# Patient Record
Sex: FEMALE | Race: WHITE | NOT HISPANIC OR LATINO | Employment: OTHER | URBAN - METROPOLITAN AREA
[De-identification: names, ages, dates, MRNs, and addresses within clinical notes are randomized per-mention and may not be internally consistent; named-entity substitution may affect disease eponyms.]

---

## 2017-02-13 RX ORDER — ZOLPIDEM TARTRATE 10 MG/1
TABLET ORAL
Qty: 30 TABLET | Refills: 0 | Status: SHIPPED | OUTPATIENT
Start: 2017-02-13 | End: 2017-03-14 | Stop reason: SDUPTHER

## 2017-02-13 RX ORDER — LEVOTHYROXINE SODIUM 75 UG/1
TABLET ORAL
Qty: 30 TABLET | Refills: 0 | Status: SHIPPED | OUTPATIENT
Start: 2017-02-13 | End: 2017-06-23 | Stop reason: SDUPTHER

## 2017-02-14 RX ORDER — ZOLPIDEM TARTRATE 10 MG/1
TABLET ORAL
Qty: 30 TABLET | Refills: 0 | OUTPATIENT
Start: 2017-02-14

## 2017-02-14 NOTE — TELEPHONE ENCOUNTER
----- Message from Marcy Villanueva sent at 2/14/2017 12:18 PM CST -----  Contact: nico/daughter [phone]  States that pt needs refill on ambien. Pt uses     [facility] - [place] - [address]  [address] [address]  Phone: [phone] Fax: [phone]    Please call back at [phone]//thank you acc

## 2017-02-16 ENCOUNTER — APPOINTMENT (OUTPATIENT)
Dept: RADIOLOGY | Facility: HOSPITAL | Age: 77
End: 2017-02-16
Attending: FAMILY MEDICINE
Payer: MEDICARE

## 2017-02-16 ENCOUNTER — OFFICE VISIT (OUTPATIENT)
Dept: INTERNAL MEDICINE | Facility: CLINIC | Age: 77
End: 2017-02-16
Payer: MEDICARE

## 2017-02-16 VITALS
OXYGEN SATURATION: 94 % | WEIGHT: 156.88 LBS | SYSTOLIC BLOOD PRESSURE: 203 MMHG | TEMPERATURE: 98 F | HEART RATE: 74 BPM | DIASTOLIC BLOOD PRESSURE: 101 MMHG | BODY MASS INDEX: 28.69 KG/M2

## 2017-02-16 DIAGNOSIS — N18.4 CKD (CHRONIC KIDNEY DISEASE) STAGE 4, GFR 15-29 ML/MIN: ICD-10-CM

## 2017-02-16 DIAGNOSIS — I10 ESSENTIAL HYPERTENSION: ICD-10-CM

## 2017-02-16 DIAGNOSIS — I71.40 ABDOMINAL AORTIC ANEURYSM (AAA) WITHOUT RUPTURE: ICD-10-CM

## 2017-02-16 DIAGNOSIS — E78.2 MIXED HYPERLIPIDEMIA: ICD-10-CM

## 2017-02-16 DIAGNOSIS — M54.50 ACUTE BILATERAL LOW BACK PAIN WITHOUT SCIATICA: ICD-10-CM

## 2017-02-16 DIAGNOSIS — M54.50 ACUTE BILATERAL LOW BACK PAIN WITHOUT SCIATICA: Primary | ICD-10-CM

## 2017-02-16 PROCEDURE — 72100 X-RAY EXAM L-S SPINE 2/3 VWS: CPT | Mod: TC,PO

## 2017-02-16 PROCEDURE — 72100 X-RAY EXAM L-S SPINE 2/3 VWS: CPT | Mod: 26,,, | Performed by: RADIOLOGY

## 2017-02-16 PROCEDURE — 99214 OFFICE O/P EST MOD 30 MIN: CPT | Mod: S$PBB,,, | Performed by: FAMILY MEDICINE

## 2017-02-16 PROCEDURE — 99999 PR PBB SHADOW E&M-EST. PATIENT-LVL III: CPT | Mod: PBBFAC,,, | Performed by: FAMILY MEDICINE

## 2017-02-16 RX ORDER — TRAMADOL HYDROCHLORIDE 50 MG/1
50 TABLET ORAL EVERY 6 HOURS PRN
Qty: 30 TABLET | Refills: 0 | Status: SHIPPED | OUTPATIENT
Start: 2017-02-16 | End: 2017-11-01 | Stop reason: SDUPTHER

## 2017-02-16 NOTE — MR AVS SNAPSHOT
[facility]  [address]  [place] [address]  Phone: [phone]  Fax: [phone]                  Anu Blanco   2017 1:40 PM   Office Visit    Description:  Female : 1940   Provider:  Serena Chu MD   Department:  [facility]           Reason for Visit     Abdominal Pain     Back Pain           Diagnoses this Visit        Comments    Acute bilateral low back pain without sciatica    -  Primary            To Do List           Future Appointments        Provider Department Dept Phone    2017 3:15 PM [facility] XR1 [facility] [phone]      Goals (5 Years of Data)     None       These Medications        Disp Refills Start End    tramadol (ULTRAM) 50 mg tablet 30 tablet 0 2017    Take 1 tablet (50 mg total) by mouth every 6 (six) hours as needed for Pain. - Oral    Pharmacy: [facility] [address] [place], [place] [address]ON [address] Ph #: [phone]         [facility]     [facility] Care Line -  Assistance  Registered nurses in the [facility] Center provide clinical advisement, health education, appointment booking, and other advisory services.  Call for this free service at [phone].             Medications           Message regarding Medications     Verify the changes and/or additions to your medication regime listed below are the same as discussed with your clinician today.  If any of these changes or additions are incorrect, please notify your healthcare provider.        START taking these NEW medications        Refills    tramadol (ULTRAM) 50 mg tablet 0    Sig: Take 1 tablet (50 mg total) by mouth every 6 (six) hours as needed for Pain.    Class: Normal    Route: Oral           Verify that the below list of medications is an accurate representation of the medications you are currently taking.  If none reported, the list may be blank. If  incorrect, please contact your healthcare provider. Carry this list with you in case of emergency.           Current Medications     ACETAMINOPHEN (TYLENOL ARTHRITIS ORAL) Take by mouth once daily.    atorvastatin (LIPITOR) 40 MG tablet Take 1 tablet (40 mg total) by mouth once daily.    calcitRIOL (ROCALTROL) 0.25 MCG Cap Take 1 capsule by mouth once daily.    ERGOCALCIFEROL, VITAMIN D2, (VITAMIN D2 ORAL) Take by mouth once daily.    hydrALAZINE (APRESOLINE) 25 MG tablet 25 mg 2 (two) times daily.     levothyroxine (SYNTHROID) 75 MCG tablet TAKE ONE TABLET BY MOUTH ONCE DAILY    zolpidem (AMBIEN) 10 mg Tab TAKE ONE TABLET BY MOUTH ONCE DAILY AT BEDTIME AS NEEDED    carvedilol (COREG) 25 MG tablet Take 1 tablet by mouth once daily.    tramadol (ULTRAM) 50 mg tablet Take 1 tablet (50 mg total) by mouth every 6 (six) hours as needed for Pain.           Clinical Reference Information           Your Vitals Were     BP Pulse Temp    203/101 (BP Location: Left arm, Patient Position: Sitting, BP Method: Automatic) 74 97.9 °F (36.6 °C) (Tympanic)    Weight SpO2 BMI    71.2 kg (156 lb 13.7 oz) 94% 28.69 kg/m2      Blood Pressure          Most Recent Value    BP  (!)  203/101      Allergies as of 2/16/2017     Codeine    Corticosteroids (Glucocorticoids)    Diovan Hct [Valsartan-hydrochlorothiazide]    Hydrocodone    Indomethacin    Iodine And Iodide Containing Products    Lortab [Hydrocodone-acetaminophen]    Omnicef [Cefdinir]    Oxycodone    Penicillins      Immunizations Administered on Date of Encounter - 2/16/2017     None      Orders Placed During Today's Visit     Future Labs/Procedures Expected by Expires    X-Ray Lumbar Spine AP And Lateral  2/16/2017 2/16/2018      [facility] Sign-Up     Activating your [facility] account is as easy as 1-2-3!     1) Visit [email or web address], select Sign Up Now, enter this activation code and your date of birth, then select Next.  [email or web address]  Expires: 4/2/2017  3:10 PM      2)  Create a username and password to use when you visit [facility] in the future and select a security question in case you lose your password and select Next.    3) Enter your e-mail address and click Sign Up!    Additional Information  If you have questions, please e-mail [email or web address] or call [phone] to talk to our [facility] staff. Remember, [facility] is NOT to be used for urgent needs. For medical emergencies, dial 911.         Language Assistance Services     ATTENTION: Language assistance services are available, free of charge. Please call [phone].      ATENCIÓN: Si habla español, tiene a richardson disposición servicios gratuitos de asistencia lingüística. Llame al [phone].     CHÚ Ý: N?u b?n nói Ti?ng Vi?t, có các d?ch v? h? tr? ngôn ng? mi?n phí dành cho b?n. G?i s? [phone].         [facility] complies with applicable Federal civil rights laws and does not discriminate on the basis of race, color, national origin, age, disability, or sex.

## 2017-02-17 ENCOUNTER — TELEPHONE (OUTPATIENT)
Dept: INTERNAL MEDICINE | Facility: CLINIC | Age: 77
End: 2017-02-17

## 2017-02-17 NOTE — TELEPHONE ENCOUNTER
----- Message from Mary Belcher sent at 2/17/2017  9:55 AM CST -----  Contact: Isis/daughter  Isis returned call, Please call her at [phone].    Thanks  Td

## 2017-02-17 NOTE — TELEPHONE ENCOUNTER
Daughter has been informed of the appointments.  Also she informed me that the patient said she was able to sleep last night with the tramadol and tylenol, but she was afraid to take the tramadol during the day because of the sedating affect.  So how many mg of Tylenol can she have in a 24 hour period

## 2017-02-17 NOTE — TELEPHONE ENCOUNTER
Appointment scheduled for 2/20/17 at 10 am with an arrival time at 9:30 am.  Dr juan carlos Giron.  Referral faxed.  Message left for the patient.

## 2017-02-17 NOTE — PROGRESS NOTES
Subjective:       Patient ID: Anu Blanco is a 77 y.o. female.    Chief Complaint: Abdominal Pain and Back Pain    HPI Comments: She is a new patient to me.  She has a history of coronary artery disease, PAD, aortic valve replacement, breast cancer, OA, hypertension, hyperlipidemia, fibromyalgia, hypothyroidism, memory impairment and a history of a syncopal episode last July.    She is here with her daughter, Isis Stover.  She had a fall 3 days ago in her bathroom.  She remembers falling and that she could get up but not much else.  She has a bruise and a tender spot on forehead, a bruise to her right upper arm, and she is complaining of low back pain.  She has had a history of back pain off and on in the past.  She did not have any complaints of back pain or memory of significant back pain at the time of the fall.  It has seemed to progress over the next 3 days.  She had abdominal pain initially which has resolved completely.  She also is having some left leg pain which has resolved.  She took tramadol and a Tylenol last night and felt it helped her sleep.     Note high blood pressure on initial intake of 203/101.  She states she is in renal failure, [facility]  Her repeat lipid pressure is 180/92.  She states she did not take her blood pressure medicine this morning and feels the pain is increasing her blood pressure.      Back Pain   The current episode started in the past 7 days. The problem occurs constantly. The problem has been gradually worsening since onset. The pain is present in the lumbar spine. The quality of the pain is described as aching and stabbing. The pain does not radiate. The pain is severe. The symptoms are aggravated by position and standing. Pertinent negatives include no abdominal pain, fever or leg pain. She has tried analgesics for the symptoms. The treatment provided mild relief.   Fall   The accident occurred 3 to 5 days ago. The fall  occurred while standing. Distance fallen: Standing. She landed on hard floor. There was no blood loss. The point of impact was the head (Right upper arm). The pain is present in the back. The pain is severe. The symptoms are aggravated by standing and movement. Pertinent negatives include no abdominal pain, fever or loss of consciousness. She has tried acetaminophen (Tramadol) for the symptoms. The treatment provided mild relief.     Review of Systems   Constitutional: Negative for fever.   Gastrointestinal: Negative for abdominal pain.   Musculoskeletal: Positive for back pain.   Neurological: Negative for loss of consciousness.       Objective:      Physical Exam   Constitutional: She is oriented to person, place, and time. She appears well-developed and well-nourished.   HENT:   Head: Normocephalic and atraumatic.   Right Ear: Tympanic membrane, external ear and ear canal normal.   Left Ear: Tympanic membrane, external ear and ear canal normal.   Nose: Nose normal.   Mouth/Throat: Oropharynx is clear and moist. No oropharyngeal exudate.   Eyes: Conjunctivae and EOM are normal.   Neck: Normal range of motion. Neck supple. No thyromegaly present.   Cardiovascular: Normal rate, regular rhythm and normal heart sounds.  Exam reveals no gallop and no friction rub.    No murmur heard.  Pulmonary/Chest: Effort normal and breath sounds normal. She exhibits no tenderness.   Abdominal: Soft. She exhibits no distension. There is no tenderness.   Musculoskeletal: She exhibits no edema.   Nontender cervical paraspinals except with tenderness to left trapezius, nontender right trapezius.  She has tender thoracic and lumbar paraspinals left greater than right.  Midline spine is somewhat tender throughout.  There is no point tenderness over any specific spinous process significantly greater than any other.   Lymphadenopathy:     She has no cervical adenopathy.   Neurological: She is alert and oriented to person, place, and time.    Skin: Skin is warm and dry.   Ecchymosis to forehead just right of center with tenderness locally.  No step-offs palpated.  Ecchymosis to right upper arm.   Psychiatric: She has a normal mood and affect. Her behavior is normal.       Assessment:       1. Acute bilateral low back pain without sciatica        Plan:     1. Acute bilateral low back pain without sciatica  X-Ray Lumbar Spine AP And Lateral    tramadol (ULTRAM) 50 mg tablet     X-ray shows L2 vertebral compression fracture of uncertain age.  It also shows a fusiform infrarenal abdominal aortic aneurysm.  I discussed both with daughter and patient.  Patient has a history of an abdominal aortic aneurysm which was being followed.  At a certain time she stopped having it followed as she does not want to have surgery performed.  Also note that despite the current ability to do intravascular procedures, these would require IV contrast.  Depending on what her renal function is, this may be contraindicated as well.  She has an appointment for lab at [facility] tomorrow.  I'm going to see if we can get a repeat lipid panel after reviewing her lab from last May.  Now that Crestor is generic, we could consider switching her Lipitor 40 to Crestor 40.  I reviewed with both daughter and patient recommendation for evaluation with neurosurgery for possible vertebral plasty/kyphoplasty.  Again, depending on what imaging is needed, her renal function may contraindicate this procedure as well.  Patient agrees to a neurosurgery appointment with option to cancel if she feels she is doing better.    Addendum: 2/17/17 neurosurgery appointment scheduled for 2/20/17 at 10 am with an arrival time at 9:30 am. Dr juan carlos Giron.

## 2017-03-14 RX ORDER — ZOLPIDEM TARTRATE 10 MG/1
TABLET ORAL
Qty: 30 TABLET | Refills: 0 | Status: SHIPPED | OUTPATIENT
Start: 2017-03-14 | End: 2017-04-11 | Stop reason: SDUPTHER

## 2017-03-15 RX ORDER — ZOLPIDEM TARTRATE 10 MG/1
TABLET ORAL
Qty: 30 TABLET | Refills: 0 | OUTPATIENT
Start: 2017-03-15

## 2017-03-20 RX ORDER — ATORVASTATIN CALCIUM 40 MG/1
TABLET, FILM COATED ORAL
Qty: 30 TABLET | Refills: 2 | Status: SHIPPED | OUTPATIENT
Start: 2017-03-20

## 2017-04-11 RX ORDER — ZOLPIDEM TARTRATE 10 MG/1
TABLET ORAL
Qty: 30 TABLET | Refills: 0 | OUTPATIENT
Start: 2017-04-11

## 2017-04-11 RX ORDER — ZOLPIDEM TARTRATE 10 MG/1
TABLET ORAL
Qty: 30 TABLET | Refills: 0 | Status: SHIPPED | OUTPATIENT
Start: 2017-04-11 | End: 2017-04-11 | Stop reason: SDUPTHER

## 2017-04-12 RX ORDER — ZOLPIDEM TARTRATE 10 MG/1
TABLET ORAL
Qty: 30 TABLET | Refills: 0 | Status: SHIPPED | OUTPATIENT
Start: 2017-04-12 | End: 2017-04-27 | Stop reason: SDUPTHER

## 2017-04-12 RX ORDER — ZOLPIDEM TARTRATE 10 MG/1
TABLET ORAL
Qty: 30 TABLET | Refills: 0 | OUTPATIENT
Start: 2017-04-12

## 2017-04-13 RX ORDER — ZOLPIDEM TARTRATE 10 MG/1
TABLET ORAL
Qty: 30 TABLET | Refills: 0 | OUTPATIENT
Start: 2017-04-13

## 2017-04-27 NOTE — TELEPHONE ENCOUNTER
----- Message from Anu Cummings sent at 4/27/2017  4:00 PM CDT -----  Contact: nico-daughter  Pt daughter would like to speak to nurse regarding a refill for ambien (generic). Pt only has one pill left for tonight. Please call back @ [phone]. Thanks    .[address] [facility]  [address] [facility] [address]  Phone: [phone] Fax: [phone]

## 2017-04-27 NOTE — TELEPHONE ENCOUNTER
Called and spoke with the daughter, the script was already sent.  Called the pharmacy her last refill was done on 4/13/17.

## 2017-05-01 RX ORDER — ZOLPIDEM TARTRATE 10 MG/1
TABLET ORAL
Qty: 30 TABLET | Refills: 0 | Status: SHIPPED | OUTPATIENT
Start: 2017-05-01 | End: 2017-06-08 | Stop reason: SDUPTHER

## 2017-06-08 RX ORDER — ZOLPIDEM TARTRATE 10 MG/1
TABLET ORAL
Qty: 30 TABLET | Refills: 0 | Status: SHIPPED | OUTPATIENT
Start: 2017-06-08 | End: 2017-07-07 | Stop reason: SDUPTHER

## 2017-06-13 RX ORDER — ZOLPIDEM TARTRATE 10 MG/1
TABLET ORAL
Qty: 30 TABLET | Refills: 0 | OUTPATIENT
Start: 2017-06-13

## 2017-06-26 RX ORDER — LEVOTHYROXINE SODIUM 75 UG/1
TABLET ORAL
Qty: 30 TABLET | Refills: 0 | Status: SHIPPED | OUTPATIENT
Start: 2017-06-26 | End: 2017-06-27 | Stop reason: SDUPTHER

## 2017-06-27 RX ORDER — LEVOTHYROXINE SODIUM 75 UG/1
75 TABLET ORAL DAILY
Qty: 30 TABLET | Refills: 0 | Status: SHIPPED | OUTPATIENT
Start: 2017-06-27

## 2017-07-10 RX ORDER — ZOLPIDEM TARTRATE 10 MG/1
TABLET ORAL
Qty: 30 TABLET | Refills: 0 | Status: SHIPPED | OUTPATIENT
Start: 2017-07-10 | End: 2017-08-11 | Stop reason: SDUPTHER

## 2017-07-11 ENCOUNTER — TELEPHONE (OUTPATIENT)
Dept: INTERNAL MEDICINE | Facility: CLINIC | Age: 77
End: 2017-07-11

## 2017-07-11 NOTE — TELEPHONE ENCOUNTER
----- Message from Mary Belcher sent at 7/11/2017  2:46 PM CDT -----  Contact: Isis/daughter  1. What is the name of the medication you are requesting? Rx Ambien  2. What is the dose? 10 mg  3. How do you take the medication? Orally, topically, etc? oral  4. How often do you take this medication? Once at bed time  5. Do you need a 30 day or 90 day supply? 30  6. How many refills are you requesting? 5  7. What is your preferred pharmacy and location of the pharmacy?     [address] [facility]  [address] [facility] [address] [address]  Phone: [phone] Fax: [phone]    8. Who can we contact with further questions? Isis/[phone]

## 2017-08-11 ENCOUNTER — NURSE TRIAGE (OUTPATIENT)
Dept: ADMINISTRATIVE | Facility: CLINIC | Age: 77
End: 2017-08-11

## 2017-08-11 NOTE — TELEPHONE ENCOUNTER
----- Message from Gilda Noe sent at 8/11/2017  4:39 PM CDT -----  Contact: daughter  States the pt needs a refill on her sleep medication, pt can be reached at [phone]///thxMW

## 2017-08-12 NOTE — TELEPHONE ENCOUNTER
Reason for Disposition   Caller requesting a NON-URGENT new prescription or refill and triager unable to refill per unit policy    Protocols used: ST MEDICATION QUESTION CALL-A-GUILLERMINA Brennan's daughter called to request ambien refill for her mother.  Explained that scheduled medication is to be refilled during clinic hours. She states she will bring [facility] to [facility] for visit and refill.  Provided her with the names of [facility] open tomorrow.  Message to Douglas Worley MD . Please contact caller directly with any additional care advice.

## 2017-08-14 ENCOUNTER — TELEPHONE (OUTPATIENT)
Dept: INTERNAL MEDICINE | Facility: CLINIC | Age: 77
End: 2017-08-14

## 2017-08-14 ENCOUNTER — NURSE TRIAGE (OUTPATIENT)
Dept: ADMINISTRATIVE | Facility: CLINIC | Age: 77
End: 2017-08-14

## 2017-08-14 RX ORDER — ZOLPIDEM TARTRATE 10 MG/1
TABLET ORAL
Qty: 30 TABLET | Refills: 0 | Status: SHIPPED | OUTPATIENT
Start: 2017-08-14 | End: 2017-09-06 | Stop reason: SDUPTHER

## 2017-08-14 NOTE — TELEPHONE ENCOUNTER
Spoke with daughter, informed Dr. Worley was not in office today, will be sending to Dr. Chu, also states she wanted to make Dr. Chu her PCP, has been changed.

## 2017-08-14 NOTE — TELEPHONE ENCOUNTER
----- Message from Romina Lawson sent at 8/14/2017  8:16 AM CDT -----  Contact: Patients daughter, Isis Marinelli is checking on the status of a refill on Ambien stating her mother has not slept all weekend, please call her back at [phone]. Thank you

## 2017-08-14 NOTE — TELEPHONE ENCOUNTER
----- Message from Malaika Macho sent at 8/14/2017  2:31 PM CDT -----  Contact: pt  1. What is the name of the medication you are requesting? Ambien  2. What is the dose? 10mg  3. How do you take the medication? Orally, topically, etc? orally  4. How often do you take this medication? 1at bedtime  5. Do you need a 30 day or 90 day supply? 30  6. How many refills are you requesting? 1  7. What is your preferred pharmacy and location of the pharmacy? [facility]  8. Who can we contact with further questions? Pt @[phone]

## 2017-08-15 RX ORDER — ZOLPIDEM TARTRATE 10 MG/1
TABLET ORAL
Qty: 30 TABLET | Refills: 0 | Status: SHIPPED | OUTPATIENT
Start: 2017-08-15 | End: 2017-09-06 | Stop reason: SDUPTHER

## 2017-08-15 NOTE — TELEPHONE ENCOUNTER
Script was called in yesterday and today.  Pharmacy is closed at this time will call back at 9 am

## 2017-08-15 NOTE — TELEPHONE ENCOUNTER
Reason for Disposition   Caller requesting a NON-URGENT new prescription or refill and triager unable to refill per unit policy    Answer Assessment - Initial Assessment Questions  Daughter reports pt has been out of ambien since Friday night and has not slept at all since then. She has called clinic today and was advised it would be refilled but pharmacy didn't receive anything.    Protocols used: ST MEDICATION QUESTION CALL-A-AH    Advised I would send a note to office but can't say this will be done this pm if she has not actually slept at all since Friday night would recommend she take her to an UC or ER for evaluation.

## 2017-09-06 RX ORDER — ZOLPIDEM TARTRATE 10 MG/1
TABLET ORAL
Qty: 30 TABLET | Refills: 2 | Status: ON HOLD | OUTPATIENT
Start: 2017-09-06 | End: 2018-03-27 | Stop reason: HOSPADM

## 2017-10-31 ENCOUNTER — TELEPHONE (OUTPATIENT)
Dept: INTERNAL MEDICINE | Facility: CLINIC | Age: 77
End: 2017-10-31

## 2017-10-31 DIAGNOSIS — M54.50 ACUTE BILATERAL LOW BACK PAIN WITHOUT SCIATICA: ICD-10-CM

## 2017-10-31 NOTE — TELEPHONE ENCOUNTER
[facility]  Compression fracture L2 and 3   Pt is in a lot of pain.  Was given percocet at the ER and the patient threw it away   Doesn't like the way it makes her feel.  Please advise if there is a brace or something that she can do

## 2017-10-31 NOTE — TELEPHONE ENCOUNTER
----- Message from Jacinda Horowitz sent at 10/31/2017  3:46 PM CDT -----  Contact: pt daughter nico   Please call pt daughter back at [phone] about seeing pt today for super pain from a fall.

## 2017-10-31 NOTE — TELEPHONE ENCOUNTER
Left message for patient.  She may benefit from evaluation by a neurosurgeon for a possible procedure.  She may just need an alternate pain medication.  She had an L2 compression fracture, mild wedging seen on x-ray 2/16/17.  She may have a new L3 fracture at this point.

## 2017-11-01 ENCOUNTER — TELEPHONE (OUTPATIENT)
Dept: INTERNAL MEDICINE | Facility: CLINIC | Age: 77
End: 2017-11-01

## 2017-11-01 RX ORDER — TRAMADOL HYDROCHLORIDE 50 MG/1
50 TABLET ORAL EVERY 6 HOURS PRN
Qty: 30 TABLET | Refills: 0 | Status: SHIPPED | OUTPATIENT
Start: 2017-11-01 | End: 2017-11-11

## 2017-11-01 NOTE — TELEPHONE ENCOUNTER
Pt daughter nico called in stating that her mom had a fall on Friday at 1:30 am. Pt daughter wants to know if her moms pain medication can be increased. She states that her mom was prescribed oxycodone, but can not take this medication. Pt daughter also wants to know about if a back brace can be ordered for her mom and if she can be placed at a rehab facility or if not can she get home health to come out two to three days a week to help her mom out.    Please Advise

## 2017-11-01 NOTE — TELEPHONE ENCOUNTER
I spoke with patient's daughter Isis.  Patient is taking tramadol from last Feb fracture - BID - without much help.  I advised to increase to TID.    Had itching with oxycodone and hydrocodone per daughter.     I will send more tramadol to her pharmacy.  I will place a referral for home health and patient must see me within 4 weeks if she accepts home health.  Recommendation given for back specialist with [facility] if desired. Pt declined pursuing arthroplasty again this time per daughter.

## 2017-11-01 NOTE — TELEPHONE ENCOUNTER
----- Message from Lavinia Belcher sent at 11/1/2017  2:20 PM CDT -----  Contact: [facility]  Call caller regarding home health for pt. Caller states that they cant except pt till she see the doctor.  [phone]

## 2017-11-01 NOTE — TELEPHONE ENCOUNTER
Appointment needs to be in the records within 30 days due to medicare.  Appointment made per the daughter

## 2017-11-07 ENCOUNTER — TELEPHONE (OUTPATIENT)
Dept: INTERNAL MEDICINE | Facility: CLINIC | Age: 77
End: 2017-11-07

## 2017-11-07 ENCOUNTER — OFFICE VISIT (OUTPATIENT)
Dept: INTERNAL MEDICINE | Facility: CLINIC | Age: 77
End: 2017-11-07
Payer: MEDICARE

## 2017-11-07 VITALS
SYSTOLIC BLOOD PRESSURE: 124 MMHG | HEART RATE: 63 BPM | RESPIRATION RATE: 18 BRPM | TEMPERATURE: 98 F | DIASTOLIC BLOOD PRESSURE: 74 MMHG

## 2017-11-07 DIAGNOSIS — E03.4 HYPOTHYROIDISM DUE TO ACQUIRED ATROPHY OF THYROID: ICD-10-CM

## 2017-11-07 DIAGNOSIS — R41.0 INTERMITTENT CONFUSION: Primary | ICD-10-CM

## 2017-11-07 DIAGNOSIS — Z23 IMMUNIZATION DUE: ICD-10-CM

## 2017-11-07 DIAGNOSIS — N18.4 CKD (CHRONIC KIDNEY DISEASE) STAGE 4, GFR 15-29 ML/MIN: ICD-10-CM

## 2017-11-07 DIAGNOSIS — I10 ESSENTIAL HYPERTENSION: ICD-10-CM

## 2017-11-07 LAB
ANION GAP SERPL CALC-SCNC: 8 MMOL/L
BUN SERPL-MCNC: 27 MG/DL
CALCIUM SERPL-MCNC: 9.7 MG/DL
CHLORIDE SERPL-SCNC: 101 MMOL/L
CO2 SERPL-SCNC: 27 MMOL/L
CREAT SERPL-MCNC: 1.9 MG/DL
EST. GFR  (AFRICAN AMERICAN): 28.5 ML/MIN/1.73 M^2
EST. GFR  (NON AFRICAN AMERICAN): 24.7 ML/MIN/1.73 M^2
GLUCOSE SERPL-MCNC: 103 MG/DL
POTASSIUM SERPL-SCNC: 4 MMOL/L
SODIUM SERPL-SCNC: 136 MMOL/L
T4 FREE SERPL-MCNC: 1.1 NG/DL
TSH SERPL DL<=0.005 MIU/L-ACNC: 5.44 UIU/ML

## 2017-11-07 PROCEDURE — 84439 ASSAY OF FREE THYROXINE: CPT

## 2017-11-07 PROCEDURE — 80048 BASIC METABOLIC PNL TOTAL CA: CPT

## 2017-11-07 PROCEDURE — 99213 OFFICE O/P EST LOW 20 MIN: CPT | Mod: S$PBB,,, | Performed by: FAMILY MEDICINE

## 2017-11-07 PROCEDURE — 99213 OFFICE O/P EST LOW 20 MIN: CPT | Mod: PBBFAC,PO | Performed by: FAMILY MEDICINE

## 2017-11-07 PROCEDURE — 99999 PR PBB SHADOW E&M-EST. PATIENT-LVL III: CPT | Mod: PBBFAC,,, | Performed by: FAMILY MEDICINE

## 2017-11-07 PROCEDURE — 84443 ASSAY THYROID STIM HORMONE: CPT

## 2017-11-07 PROCEDURE — G0008 ADMIN INFLUENZA VIRUS VAC: HCPCS | Mod: PBBFAC,PO

## 2017-11-07 RX ORDER — AMLODIPINE BESYLATE 5 MG/1
5 TABLET ORAL DAILY
COMMUNITY
Start: 2017-10-16 | End: 2018-05-04 | Stop reason: ALTCHOICE

## 2017-11-07 NOTE — TELEPHONE ENCOUNTER
----- Message from Jacinda Horowitz sent at 11/7/2017 11:45 AM CST -----  Please call pt daughter back at [phone] in regards pt is having some confusion. Pt daughter ants her to be seen today. Pt is talking like she is in the nursing home.

## 2017-11-08 ENCOUNTER — TELEPHONE (OUTPATIENT)
Dept: INTERNAL MEDICINE | Facility: CLINIC | Age: 77
End: 2017-11-08

## 2017-11-08 DIAGNOSIS — R41.0 INTERMITTENT CONFUSION: ICD-10-CM

## 2017-11-08 DIAGNOSIS — E03.4 HYPOTHYROIDISM DUE TO ACQUIRED ATROPHY OF THYROID: ICD-10-CM

## 2017-11-08 NOTE — TELEPHONE ENCOUNTER
----- Message from Isrrael Coates sent at 11/8/2017  4:07 PM CST -----  Contact: Ema ( [facility] )  Ema ( [facility] ) is requesting an order for a bedside commode.        Please call Ema ( [facility] )  [phone]

## 2017-11-09 NOTE — TELEPHONE ENCOUNTER
Dr Lee,  Jesse yuen.    She fell again and broke her ankle she is at [facility] waiting to see if they can do surgery on the ankle.

## 2017-11-09 NOTE — TELEPHONE ENCOUNTER
----- Message from Serena Chu MD sent at 11/8/2017  8:13 AM CST -----  Thyroid tests does show that your supplementation might be a little less than desirable.  However the confirmatory testing was within normal range.  No change at this time to medication.  Kidney function is at stage IV.  Your EGFR is 24.7.  Please follow up with your nephrologist/kidney doctor.  Recheck with me as discussed.

## 2017-11-09 NOTE — TELEPHONE ENCOUNTER
----- Message from Susannah Adame sent at 11/9/2017  4:17 PM CST -----  Contact: ms walsh-daughter  Returning call regarding patient. Please call back at [phone].        Thanks,  Susannah Adame

## 2017-11-09 NOTE — TELEPHONE ENCOUNTER
She got up in the night, tripped/fell, broken ankle - getting surgery tonight. She had done well with mentation off the tramadol. No further problems since her visit 11/7/17.    Lab results given to pt and daughter - will needTSH recheck early January latest.

## 2017-11-10 ENCOUNTER — TELEPHONE (OUTPATIENT)
Dept: INTERNAL MEDICINE | Facility: CLINIC | Age: 77
End: 2017-11-10

## 2017-11-10 NOTE — TELEPHONE ENCOUNTER
Called and spoke with the patient daughter and informed her of her moms order for her commode for home use. Pt daughter states that he mom is back in the hospital for breaking her foot in two places. Pt daughter states that she will  the order at a later date and time. Pt daughter verbalized understanding of the information given.

## 2017-11-11 NOTE — PROGRESS NOTES
Subjective:       Patient ID: Anu Blanco is a 77 y.o. female.    Chief Complaint: Altered Mental Status    Here, with her daughter Isis Stover and her , for hospital follow-up.  She had trouble since falling on the bathroom floor 10/27/17.  She was diagnosed with 2 vertebral fractures and given oxycodone.  She threw out this medication as she did not tolerate how she felt.  I was notified of her status over the phone and on 11/1/17 sent a refill for tramadol to her pharmacy.    Now over the past weekend she has had several episodes of confusion and possible delusions.  Then yesterday morning she was unable to get off the commode.  She was seen at [facility] after EMS thought they found right upper extremity weakness.  However, she was seen by Dr. Perez in consultation at the ED.  Her CT scan was negative for acute CVA and his exam showed no focal weakness.  Therefore she was discharged.  Tramadol was stopped.  She is also using Tylenol PM and Ambien 10 mg.      Review of Systems   Musculoskeletal: Positive for back pain and gait problem.   Neurological: Positive for weakness.   Psychiatric/Behavioral: Positive for confusion.       Objective:      Physical Exam   Constitutional: She is oriented to person, place, and time. She appears well-developed.   Seated in wheel chair   HENT:   Head: Normocephalic and atraumatic.   Cardiovascular: Normal rate, regular rhythm and normal heart sounds.    Pulmonary/Chest: Effort normal and breath sounds normal.   Musculoskeletal: She exhibits edema (trace bilateral ankles).   Neurological: She is alert and oriented to person, place, and time.   Skin: Skin is warm and dry.   Psychiatric: She has a normal mood and affect. Her behavior is normal.         Assessment/Plan:     1. Intermittent confusion     2. Essential hypertension  Basic metabolic panel   3. Vertebral compression fracture, initial encounter     4. Immunization due  Influenza - High Dose (65+)  (PF) (IM)   5. CKD (chronic kidney disease) stage 4, GFR 15-29 ml/min  Basic metabolic panel   6. Hypothyroidism due to acquired atrophy of thyroid  TSH    continue without tramadol and monitor for mental status.  She has home health.  She may need inpatient rehabilitation if transfers cannot be managed.

## 2018-03-20 ENCOUNTER — TELEPHONE (OUTPATIENT)
Dept: INTERNAL MEDICINE | Facility: CLINIC | Age: 78
End: 2018-03-20

## 2018-03-20 NOTE — TELEPHONE ENCOUNTER
Spoke with Cristal from [facility]. She states that the patient daughter called in stating that the pt is having some swelling and pain with her rt foot. Pt had surgery on her right foot in November of 20/17. Pt is also running a fever and unable to walk on her foot. Cristal was advised that I would call the pt daughter to find out more information. Pt daughter stated the same thing. That her mom right foot is swollen painful and she can not walk on it. Pt had a fever last night but her daughter states that she gave her tylenol and her fever broke. Pt daughter state that her mom can come in and be seen on Thursday morning, but there is no opening. Pt daughter states that her mom can come in on the evening on Friday. I will call the pt daughter to place her mom with Dr. Chu for Friday evening.    Please Advise

## 2018-03-20 NOTE — TELEPHONE ENCOUNTER
----- Message from Marcy Villanueva sent at 3/20/2018  1:59 PM CDT -----  Contact: Cristal/[facility]  [phone]  States that pt is complaining of rt foot swelling and pain. States that pt is running a fever and unable to walk on rt foot. Please call back at [phone]//thank you acc

## 2018-03-20 NOTE — TELEPHONE ENCOUNTER
I spoke with pt and daughter - she has swelling to the right lower extremity especially to the foot.  She had an earlier ankle fracture with surgery back in November.  Denies any redness.  But it is hurting to walk on that right side.  Has chronic left lower extremity swelling/pain.    Subjective fever last night - given 2 tylenol PM last night. Pt has feet up today. OT was there today.    Patient states she is willing to see another doctor sooner.  Currently she is scheduled to see me on Friday.  I'm concerned about blood clot with this pain and swelling. May also benefit from xray foot/ankle.  I spoke with the daughter and she will obtain an appointment with Dr. Worley for Wednesday afternoon.

## 2018-03-21 ENCOUNTER — TELEPHONE (OUTPATIENT)
Dept: INTERNAL MEDICINE | Facility: CLINIC | Age: 78
End: 2018-03-21

## 2018-03-21 NOTE — TELEPHONE ENCOUNTER
----- Message from Malaika Pritchard sent at 3/21/2018  2:35 PM CDT -----  Contact: daughter/Isis.  Please call pt daughter @ [phone] regarding work in appt on 3/23 at 2pm, states nurse was going to put it in system.

## 2018-03-22 ENCOUNTER — TELEPHONE (OUTPATIENT)
Dept: INTERNAL MEDICINE | Facility: CLINIC | Age: 78
End: 2018-03-22

## 2018-03-22 NOTE — TELEPHONE ENCOUNTER
Returned the pt daughter nico phone call. She was calling in regards to getting her mom scheduled for 3/23/18 to see Dr. Chu. Schedule the pt to see Dr. Chu on tomorrow for 2 pm. Pt daughter verbalized understanding of the information given.

## 2018-03-22 NOTE — TELEPHONE ENCOUNTER
----- Message from Malaika Pritchard sent at 3/22/2018  9:46 AM CDT -----  Contact: Isis/daughter  Please call pt daughter @ [phone],states she returning nurse call.

## 2018-03-23 ENCOUNTER — HOSPITAL ENCOUNTER (INPATIENT)
Facility: HOSPITAL | Age: 78
LOS: 3 days | Discharge: NURSING, REHAB OR LONG-TERM CARE | DRG: 308 | End: 2018-03-27
Attending: EMERGENCY MEDICINE | Admitting: INTERNAL MEDICINE
Payer: MEDICARE

## 2018-03-23 ENCOUNTER — TELEPHONE (OUTPATIENT)
Dept: INTERNAL MEDICINE | Facility: CLINIC | Age: 78
End: 2018-03-23

## 2018-03-23 DIAGNOSIS — I48.0 PAROXYSMAL ATRIAL FIBRILLATION: ICD-10-CM

## 2018-03-23 DIAGNOSIS — I48.91 ATRIAL FIBRILLATION, NEW ONSET: ICD-10-CM

## 2018-03-23 DIAGNOSIS — N17.9 AKI (ACUTE KIDNEY INJURY): ICD-10-CM

## 2018-03-23 DIAGNOSIS — M79.606 LOWER EXTREMITY PAIN: ICD-10-CM

## 2018-03-23 DIAGNOSIS — R41.0 CONFUSION: ICD-10-CM

## 2018-03-23 DIAGNOSIS — R41.82 ALTERED MENTAL STATUS: ICD-10-CM

## 2018-03-23 DIAGNOSIS — I25.10 CORONARY ARTERY DISEASE INVOLVING NATIVE CORONARY ARTERY OF NATIVE HEART WITHOUT ANGINA PECTORIS: ICD-10-CM

## 2018-03-23 DIAGNOSIS — E83.52 HYPERCALCEMIA: ICD-10-CM

## 2018-03-23 DIAGNOSIS — I10 ESSENTIAL HYPERTENSION: ICD-10-CM

## 2018-03-23 DIAGNOSIS — R79.89 ELEVATED TROPONIN: ICD-10-CM

## 2018-03-23 DIAGNOSIS — I48.91 ATRIAL FIBRILLATION WITH RAPID VENTRICULAR RESPONSE: Primary | ICD-10-CM

## 2018-03-23 DIAGNOSIS — I95.2 HYPOTENSION DUE TO DRUGS: ICD-10-CM

## 2018-03-23 DIAGNOSIS — R00.0 TACHYCARDIA: ICD-10-CM

## 2018-03-23 DIAGNOSIS — I48.91 A-FIB: ICD-10-CM

## 2018-03-23 LAB
ALBUMIN SERPL BCP-MCNC: 2.8 G/DL
ALP SERPL-CCNC: 109 U/L
ALT SERPL W/O P-5'-P-CCNC: 7 U/L
ANION GAP SERPL CALC-SCNC: 8 MMOL/L
AST SERPL-CCNC: 13 U/L
BASOPHILS # BLD AUTO: 0.06 K/UL
BASOPHILS NFR BLD: 0.7 %
BILIRUB SERPL-MCNC: 0.2 MG/DL
BNP SERPL-MCNC: 360 PG/ML
BUN SERPL-MCNC: 29 MG/DL
CALCIUM SERPL-MCNC: 9.3 MG/DL
CHLORIDE SERPL-SCNC: 108 MMOL/L
CO2 SERPL-SCNC: 22 MMOL/L
CREAT SERPL-MCNC: 2 MG/DL
DIFFERENTIAL METHOD: ABNORMAL
EOSINOPHIL # BLD AUTO: 0.3 K/UL
EOSINOPHIL NFR BLD: 3.7 %
ERYTHROCYTE [DISTWIDTH] IN BLOOD BY AUTOMATED COUNT: 18 %
EST. GFR  (AFRICAN AMERICAN): 27 ML/MIN/1.73 M^2
EST. GFR  (NON AFRICAN AMERICAN): 23 ML/MIN/1.73 M^2
GLUCOSE SERPL-MCNC: 125 MG/DL
HCT VFR BLD AUTO: 31.1 %
HGB BLD-MCNC: 9.6 G/DL
INR PPP: 1
LYMPHOCYTES # BLD AUTO: 2 K/UL
LYMPHOCYTES NFR BLD: 22.6 %
MCH RBC QN AUTO: 26.2 PG
MCHC RBC AUTO-ENTMCNC: 30.9 G/DL
MCV RBC AUTO: 85 FL
MONOCYTES # BLD AUTO: 1 K/UL
MONOCYTES NFR BLD: 11.2 %
NEUTROPHILS # BLD AUTO: 5.4 K/UL
NEUTROPHILS NFR BLD: 61.8 %
PLATELET # BLD AUTO: 347 K/UL
PMV BLD AUTO: 10.1 FL
POTASSIUM SERPL-SCNC: 3.8 MMOL/L
PROT SERPL-MCNC: 7.5 G/DL
PROTHROMBIN TIME: 10.1 SEC
RBC # BLD AUTO: 3.66 M/UL
SODIUM SERPL-SCNC: 138 MMOL/L
TROPONIN I SERPL DL<=0.01 NG/ML-MCNC: 0.04 NG/ML
WBC # BLD AUTO: 8.75 K/UL

## 2018-03-23 PROCEDURE — 80053 COMPREHEN METABOLIC PANEL: CPT

## 2018-03-23 PROCEDURE — 84484 ASSAY OF TROPONIN QUANT: CPT

## 2018-03-23 PROCEDURE — 96374 THER/PROPH/DIAG INJ IV PUSH: CPT

## 2018-03-23 PROCEDURE — 84443 ASSAY THYROID STIM HORMONE: CPT

## 2018-03-23 PROCEDURE — 84100 ASSAY OF PHOSPHORUS: CPT

## 2018-03-23 PROCEDURE — 99285 EMERGENCY DEPT VISIT HI MDM: CPT

## 2018-03-23 PROCEDURE — 96372 THER/PROPH/DIAG INJ SC/IM: CPT

## 2018-03-23 PROCEDURE — 85610 PROTHROMBIN TIME: CPT

## 2018-03-23 PROCEDURE — 93010 ELECTROCARDIOGRAM REPORT: CPT | Mod: ,,, | Performed by: INTERNAL MEDICINE

## 2018-03-23 PROCEDURE — 96375 TX/PRO/DX INJ NEW DRUG ADDON: CPT

## 2018-03-23 PROCEDURE — 83735 ASSAY OF MAGNESIUM: CPT

## 2018-03-23 PROCEDURE — 85025 COMPLETE CBC W/AUTO DIFF WBC: CPT

## 2018-03-23 PROCEDURE — 83880 ASSAY OF NATRIURETIC PEPTIDE: CPT

## 2018-03-23 RX ORDER — HYDROCODONE BITARTRATE AND ACETAMINOPHEN 10; 325 MG/1; MG/1
TABLET ORAL
Status: ON HOLD | COMMUNITY
End: 2018-03-27 | Stop reason: HOSPADM

## 2018-03-23 NOTE — TELEPHONE ENCOUNTER
----- Message from Kevan Glass sent at 3/23/2018  2:42 PM CDT -----  Contact: nico, pt's daughter  She's calling in regards to the pt being worked back into the schedule today, pls advise, [phone] (cell)

## 2018-03-23 NOTE — TELEPHONE ENCOUNTER
----- Message from Lavinia Belcher sent at 3/23/2018 10:54 AM CDT -----  Contact: Isis/pt daughter   Call caller regarding getting pt in this afternoon. Caller states that pt is worst today than before.   [phone]

## 2018-03-23 NOTE — TELEPHONE ENCOUNTER
Spoke with the pt daughter (nico) she was calling in regards to seeing about her mom being seen on today. Advised the pt daughter that her mom does have a appointment for today for 2 pm. Pt daughter verbalized understanding of the information given.

## 2018-03-24 PROBLEM — J81.0 ACUTE PULMONARY EDEMA: Status: ACTIVE | Noted: 2018-03-24

## 2018-03-24 PROBLEM — R79.89 ELEVATED TROPONIN: Status: ACTIVE | Noted: 2018-03-24

## 2018-03-24 PROBLEM — I48.91 ATRIAL FIBRILLATION WITH RAPID VENTRICULAR RESPONSE: Status: ACTIVE | Noted: 2018-03-24

## 2018-03-24 PROBLEM — N30.00 ACUTE CYSTITIS WITHOUT HEMATURIA: Status: ACTIVE | Noted: 2018-03-24

## 2018-03-24 PROBLEM — I48.91 ATRIAL FIBRILLATION, NEW ONSET: Status: ACTIVE | Noted: 2018-03-24

## 2018-03-24 PROBLEM — I50.9 ACUTE CHF: Status: ACTIVE | Noted: 2018-03-24

## 2018-03-24 LAB
AMPHET+METHAMPHET UR QL: NEGATIVE
ANION GAP SERPL CALC-SCNC: 11 MMOL/L
ANISOCYTOSIS BLD QL SMEAR: ABNORMAL
ANISOCYTOSIS BLD QL SMEAR: ABNORMAL
BACTERIA #/AREA URNS HPF: ABNORMAL /HPF
BARBITURATES UR QL SCN>200 NG/ML: NEGATIVE
BASOPHILS NFR BLD: 1 %
BASOPHILS NFR BLD: 1 %
BENZODIAZ UR QL SCN>200 NG/ML: NEGATIVE
BILIRUB UR QL STRIP: NEGATIVE
BUN SERPL-MCNC: 28 MG/DL
BZE UR QL SCN: NEGATIVE
CALCIUM SERPL-MCNC: 9.7 MG/DL
CANNABINOIDS UR QL SCN: NEGATIVE
CHLORIDE SERPL-SCNC: 107 MMOL/L
CHOLEST SERPL-MCNC: 211 MG/DL
CHOLEST/HDLC SERPL: 5.6 {RATIO}
CLARITY UR: CLEAR
CO2 SERPL-SCNC: 21 MMOL/L
COLOR UR: YELLOW
CREAT SERPL-MCNC: 1.9 MG/DL
CREAT UR-MCNC: 59.9 MG/DL
CRP SERPL-MCNC: 78.4 MG/L
DACRYOCYTES BLD QL SMEAR: ABNORMAL
DACRYOCYTES BLD QL SMEAR: ABNORMAL
DIFFERENTIAL METHOD: ABNORMAL
DIFFERENTIAL METHOD: ABNORMAL
EOSINOPHIL NFR BLD: 3 %
EOSINOPHIL NFR BLD: 3 %
ERYTHROCYTE [DISTWIDTH] IN BLOOD BY AUTOMATED COUNT: 18.3 %
ERYTHROCYTE [DISTWIDTH] IN BLOOD BY AUTOMATED COUNT: 18.3 %
EST. GFR  (AFRICAN AMERICAN): 28 ML/MIN/1.73 M^2
EST. GFR  (NON AFRICAN AMERICAN): 25 ML/MIN/1.73 M^2
ESTIMATED AVG GLUCOSE: 105 MG/DL
GIANT PLATELETS BLD QL SMEAR: PRESENT
GIANT PLATELETS BLD QL SMEAR: PRESENT
GLUCOSE SERPL-MCNC: 89 MG/DL
GLUCOSE UR QL STRIP: NEGATIVE
HBA1C MFR BLD HPLC: 5.3 %
HCT VFR BLD AUTO: 32.3 %
HCT VFR BLD AUTO: 32.3 %
HDLC SERPL-MCNC: 38 MG/DL
HDLC SERPL: 18 %
HGB BLD-MCNC: 9.8 G/DL
HGB BLD-MCNC: 9.8 G/DL
HGB UR QL STRIP: NEGATIVE
KETONES UR QL STRIP: NEGATIVE
LACTATE SERPL-SCNC: 0.8 MMOL/L
LDLC SERPL CALC-MCNC: 148.8 MG/DL
LEUKOCYTE ESTERASE UR QL STRIP: ABNORMAL
LYMPHOCYTES NFR BLD: 8 %
LYMPHOCYTES NFR BLD: 8 %
MAGNESIUM SERPL-MCNC: 2.1 MG/DL
MCH RBC QN AUTO: 26 PG
MCH RBC QN AUTO: 26 PG
MCHC RBC AUTO-ENTMCNC: 30.3 G/DL
MCHC RBC AUTO-ENTMCNC: 30.3 G/DL
MCV RBC AUTO: 86 FL
MCV RBC AUTO: 86 FL
METHADONE UR QL SCN>300 NG/ML: NEGATIVE
MICROSCOPIC COMMENT: ABNORMAL
MITRAL VALVE MOBILITY: NORMAL
MITRAL VALVE REGURGITATION: NORMAL
MONOCYTES NFR BLD: 6 %
MONOCYTES NFR BLD: 6 %
NEUTROPHILS NFR BLD: 80 %
NEUTROPHILS NFR BLD: 80 %
NEUTS BAND NFR BLD MANUAL: 2 %
NEUTS BAND NFR BLD MANUAL: 2 %
NITRITE UR QL STRIP: NEGATIVE
NONHDLC SERPL-MCNC: 173 MG/DL
OPIATES UR QL SCN: NORMAL
PCP UR QL SCN>25 NG/ML: NEGATIVE
PH UR STRIP: 6 [PH] (ref 5–8)
PHOSPHATE SERPL-MCNC: 3.8 MG/DL
PLATELET # BLD AUTO: 318 K/UL
PLATELET # BLD AUTO: 318 K/UL
PLATELET BLD QL SMEAR: ABNORMAL
PLATELET BLD QL SMEAR: ABNORMAL
PMV BLD AUTO: 10.5 FL
PMV BLD AUTO: 10.5 FL
POTASSIUM SERPL-SCNC: 3.8 MMOL/L
PROCALCITONIN SERPL IA-MCNC: 0.35 NG/ML
PROT UR QL STRIP: ABNORMAL
RBC # BLD AUTO: 3.77 M/UL
RBC # BLD AUTO: 3.77 M/UL
RBC #/AREA URNS HPF: 3 /HPF (ref 0–4)
RETIRED EF AND QEF - SEE NOTES: 55 (ref 55–65)
SODIUM SERPL-SCNC: 139 MMOL/L
SP GR UR STRIP: 1.02 (ref 1–1.03)
STOMATOCYTES BLD QL SMEAR: PRESENT
STOMATOCYTES BLD QL SMEAR: PRESENT
TARGETS BLD QL SMEAR: ABNORMAL
TARGETS BLD QL SMEAR: ABNORMAL
TOXIC GRANULES BLD QL SMEAR: PRESENT
TOXIC GRANULES BLD QL SMEAR: PRESENT
TOXICOLOGY INFORMATION: NORMAL
TRIGL SERPL-MCNC: 121 MG/DL
TROPONIN I SERPL DL<=0.01 NG/ML-MCNC: 0.05 NG/ML
TSH SERPL DL<=0.005 MIU/L-ACNC: 1.47 UIU/ML
URN SPEC COLLECT METH UR: ABNORMAL
UROBILINOGEN UR STRIP-ACNC: NEGATIVE EU/DL
WBC # BLD AUTO: 9.99 K/UL
WBC # BLD AUTO: 9.99 K/UL
WBC #/AREA URNS HPF: 25 /HPF (ref 0–5)
WBC TOXIC VACUOLES BLD QL SMEAR: PRESENT
WBC TOXIC VACUOLES BLD QL SMEAR: PRESENT
YEAST URNS QL MICRO: ABNORMAL

## 2018-03-24 PROCEDURE — 63600175 PHARM REV CODE 636 W HCPCS: Performed by: NURSE PRACTITIONER

## 2018-03-24 PROCEDURE — 25000003 PHARM REV CODE 250: Performed by: EMERGENCY MEDICINE

## 2018-03-24 PROCEDURE — 93005 ELECTROCARDIOGRAM TRACING: CPT

## 2018-03-24 PROCEDURE — 36415 COLL VENOUS BLD VENIPUNCTURE: CPT

## 2018-03-24 PROCEDURE — A4216 STERILE WATER/SALINE, 10 ML: HCPCS | Performed by: NURSE PRACTITIONER

## 2018-03-24 PROCEDURE — 94640 AIRWAY INHALATION TREATMENT: CPT

## 2018-03-24 PROCEDURE — 21400001 HC TELEMETRY ROOM

## 2018-03-24 PROCEDURE — 93306 TTE W/DOPPLER COMPLETE: CPT | Mod: 26,,, | Performed by: INTERNAL MEDICINE

## 2018-03-24 PROCEDURE — 63600175 PHARM REV CODE 636 W HCPCS: Performed by: INTERNAL MEDICINE

## 2018-03-24 PROCEDURE — 87086 URINE CULTURE/COLONY COUNT: CPT

## 2018-03-24 PROCEDURE — 84145 PROCALCITONIN (PCT): CPT

## 2018-03-24 PROCEDURE — 81000 URINALYSIS NONAUTO W/SCOPE: CPT

## 2018-03-24 PROCEDURE — G8987 SELF CARE CURRENT STATUS: HCPCS | Mod: CM

## 2018-03-24 PROCEDURE — 25000003 PHARM REV CODE 250: Performed by: NURSE PRACTITIONER

## 2018-03-24 PROCEDURE — 27000221 HC OXYGEN, UP TO 24 HOURS

## 2018-03-24 PROCEDURE — 85027 COMPLETE CBC AUTOMATED: CPT

## 2018-03-24 PROCEDURE — 97166 OT EVAL MOD COMPLEX 45 MIN: CPT

## 2018-03-24 PROCEDURE — 93010 ELECTROCARDIOGRAM REPORT: CPT | Mod: 76,,, | Performed by: INTERNAL MEDICINE

## 2018-03-24 PROCEDURE — 86140 C-REACTIVE PROTEIN: CPT

## 2018-03-24 PROCEDURE — 97530 THERAPEUTIC ACTIVITIES: CPT

## 2018-03-24 PROCEDURE — G8988 SELF CARE GOAL STATUS: HCPCS | Mod: CK

## 2018-03-24 PROCEDURE — 96372 THER/PROPH/DIAG INJ SC/IM: CPT

## 2018-03-24 PROCEDURE — 25000242 PHARM REV CODE 250 ALT 637 W/ HCPCS: Performed by: NURSE PRACTITIONER

## 2018-03-24 PROCEDURE — 83036 HEMOGLOBIN GLYCOSYLATED A1C: CPT

## 2018-03-24 PROCEDURE — 80307 DRUG TEST PRSMV CHEM ANLYZR: CPT

## 2018-03-24 PROCEDURE — 93306 TTE W/DOPPLER COMPLETE: CPT

## 2018-03-24 PROCEDURE — 94761 N-INVAS EAR/PLS OXIMETRY MLT: CPT

## 2018-03-24 PROCEDURE — 80048 BASIC METABOLIC PNL TOTAL CA: CPT

## 2018-03-24 PROCEDURE — 80061 LIPID PANEL: CPT

## 2018-03-24 PROCEDURE — 85007 BL SMEAR W/DIFF WBC COUNT: CPT

## 2018-03-24 PROCEDURE — 84484 ASSAY OF TROPONIN QUANT: CPT

## 2018-03-24 PROCEDURE — 83605 ASSAY OF LACTIC ACID: CPT

## 2018-03-24 PROCEDURE — 99223 1ST HOSP IP/OBS HIGH 75: CPT | Mod: ,,, | Performed by: INTERNAL MEDICINE

## 2018-03-24 RX ORDER — SODIUM CHLORIDE 9 MG/ML
INJECTION, SOLUTION INTRAVENOUS CONTINUOUS
Status: DISCONTINUED | OUTPATIENT
Start: 2018-03-24 | End: 2018-03-24

## 2018-03-24 RX ORDER — CARVEDILOL 12.5 MG/1
25 TABLET ORAL DAILY
Status: DISCONTINUED | OUTPATIENT
Start: 2018-03-24 | End: 2018-03-27 | Stop reason: HOSPADM

## 2018-03-24 RX ORDER — FUROSEMIDE 10 MG/ML
40 INJECTION INTRAMUSCULAR; INTRAVENOUS 2 TIMES DAILY
Status: DISCONTINUED | OUTPATIENT
Start: 2018-03-24 | End: 2018-03-25

## 2018-03-24 RX ORDER — SODIUM CHLORIDE 0.9 % (FLUSH) 0.9 %
3 SYRINGE (ML) INJECTION EVERY 8 HOURS
Status: DISCONTINUED | OUTPATIENT
Start: 2018-03-24 | End: 2018-03-27 | Stop reason: HOSPADM

## 2018-03-24 RX ORDER — CALCITRIOL 0.25 UG/1
0.25 CAPSULE ORAL DAILY
Status: DISCONTINUED | OUTPATIENT
Start: 2018-03-24 | End: 2018-03-26

## 2018-03-24 RX ORDER — DIPHENHYDRAMINE HYDROCHLORIDE 50 MG/ML
12.5 INJECTION INTRAMUSCULAR; INTRAVENOUS EVERY 6 HOURS PRN
Status: DISCONTINUED | OUTPATIENT
Start: 2018-03-24 | End: 2018-03-27 | Stop reason: HOSPADM

## 2018-03-24 RX ORDER — LEVOTHYROXINE SODIUM 75 UG/1
75 TABLET ORAL DAILY
Status: DISCONTINUED | OUTPATIENT
Start: 2018-03-24 | End: 2018-03-27 | Stop reason: HOSPADM

## 2018-03-24 RX ORDER — CARVEDILOL 12.5 MG/1
25 TABLET ORAL ONCE
Status: DISCONTINUED | OUTPATIENT
Start: 2018-03-24 | End: 2018-03-24

## 2018-03-24 RX ORDER — FUROSEMIDE 10 MG/ML
40 INJECTION INTRAMUSCULAR; INTRAVENOUS ONCE
Status: COMPLETED | OUTPATIENT
Start: 2018-03-24 | End: 2018-03-24

## 2018-03-24 RX ORDER — ASPIRIN 325 MG
325 TABLET ORAL ONCE
Status: COMPLETED | OUTPATIENT
Start: 2018-03-24 | End: 2018-03-24

## 2018-03-24 RX ORDER — IPRATROPIUM BROMIDE 0.5 MG/2.5ML
0.5 SOLUTION RESPIRATORY (INHALATION) EVERY 8 HOURS
Status: DISCONTINUED | OUTPATIENT
Start: 2018-03-24 | End: 2018-03-27 | Stop reason: HOSPADM

## 2018-03-24 RX ORDER — AMLODIPINE BESYLATE 5 MG/1
5 TABLET ORAL DAILY
Status: DISCONTINUED | OUTPATIENT
Start: 2018-03-24 | End: 2018-03-27 | Stop reason: HOSPADM

## 2018-03-24 RX ORDER — ONDANSETRON 2 MG/ML
4 INJECTION INTRAMUSCULAR; INTRAVENOUS EVERY 8 HOURS PRN
Status: DISCONTINUED | OUTPATIENT
Start: 2018-03-24 | End: 2018-03-27 | Stop reason: HOSPADM

## 2018-03-24 RX ORDER — ATORVASTATIN CALCIUM 40 MG/1
40 TABLET, FILM COATED ORAL DAILY
Status: DISCONTINUED | OUTPATIENT
Start: 2018-03-24 | End: 2018-03-27 | Stop reason: HOSPADM

## 2018-03-24 RX ORDER — HYDRALAZINE HYDROCHLORIDE 25 MG/1
25 TABLET, FILM COATED ORAL EVERY 12 HOURS
Status: DISCONTINUED | OUTPATIENT
Start: 2018-03-24 | End: 2018-03-26

## 2018-03-24 RX ORDER — METOPROLOL TARTRATE 25 MG/1
12.5 TABLET ORAL ONCE
Status: COMPLETED | OUTPATIENT
Start: 2018-03-24 | End: 2018-03-24

## 2018-03-24 RX ORDER — DILTIAZEM HYDROCHLORIDE 5 MG/ML
10 INJECTION INTRAVENOUS
Status: COMPLETED | OUTPATIENT
Start: 2018-03-24 | End: 2018-03-24

## 2018-03-24 RX ORDER — HEPARIN SODIUM 5000 [USP'U]/ML
5000 INJECTION, SOLUTION INTRAVENOUS; SUBCUTANEOUS EVERY 8 HOURS
Status: DISCONTINUED | OUTPATIENT
Start: 2018-03-24 | End: 2018-03-25

## 2018-03-24 RX ADMIN — FUROSEMIDE 40 MG: 10 INJECTION, SOLUTION INTRAMUSCULAR; INTRAVENOUS at 01:03

## 2018-03-24 RX ADMIN — CARVEDILOL 25 MG: 12.5 TABLET, FILM COATED ORAL at 09:03

## 2018-03-24 RX ADMIN — DIPHENHYDRAMINE HYDROCHLORIDE 12.5 MG: 50 INJECTION, SOLUTION INTRAMUSCULAR; INTRAVENOUS at 09:03

## 2018-03-24 RX ADMIN — IPRATROPIUM BROMIDE 0.5 MG: 0.5 SOLUTION RESPIRATORY (INHALATION) at 08:03

## 2018-03-24 RX ADMIN — HEPARIN SODIUM 5000 UNITS: 5000 INJECTION, SOLUTION INTRAVENOUS; SUBCUTANEOUS at 01:03

## 2018-03-24 RX ADMIN — LEVOTHYROXINE SODIUM 75 MCG: 75 TABLET ORAL at 05:03

## 2018-03-24 RX ADMIN — ASPIRIN 325 MG ORAL TABLET 325 MG: 325 PILL ORAL at 01:03

## 2018-03-24 RX ADMIN — CALCITRIOL 0.25 MCG: 0.25 CAPSULE, LIQUID FILLED ORAL at 09:03

## 2018-03-24 RX ADMIN — Medication 12.5 MG: at 01:03

## 2018-03-24 RX ADMIN — SODIUM CHLORIDE, PRESERVATIVE FREE 3 ML: 5 INJECTION INTRAVENOUS at 06:03

## 2018-03-24 RX ADMIN — CEFTRIAXONE 1 G: 1 INJECTION, SOLUTION INTRAVENOUS at 09:03

## 2018-03-24 RX ADMIN — SODIUM CHLORIDE, PRESERVATIVE FREE 3 ML: 5 INJECTION INTRAVENOUS at 10:03

## 2018-03-24 RX ADMIN — DILTIAZEM HYDROCHLORIDE 10 MG: 5 INJECTION INTRAVENOUS at 12:03

## 2018-03-24 RX ADMIN — SODIUM CHLORIDE, PRESERVATIVE FREE 3 ML: 5 INJECTION INTRAVENOUS at 02:03

## 2018-03-24 RX ADMIN — ATORVASTATIN CALCIUM 40 MG: 40 TABLET, FILM COATED ORAL at 09:03

## 2018-03-24 RX ADMIN — IPRATROPIUM BROMIDE 0.5 MG: 0.5 SOLUTION RESPIRATORY (INHALATION) at 03:03

## 2018-03-24 RX ADMIN — AMLODIPINE BESYLATE 5 MG: 5 TABLET ORAL at 09:03

## 2018-03-24 RX ADMIN — IPRATROPIUM BROMIDE 0.5 MG: 0.5 SOLUTION RESPIRATORY (INHALATION) at 02:03

## 2018-03-24 RX ADMIN — HEPARIN SODIUM 5000 UNITS: 5000 INJECTION, SOLUTION INTRAVENOUS; SUBCUTANEOUS at 05:03

## 2018-03-24 RX ADMIN — HEPARIN SODIUM 5000 UNITS: 5000 INJECTION, SOLUTION INTRAVENOUS; SUBCUTANEOUS at 10:03

## 2018-03-24 RX ADMIN — HYDRALAZINE HYDROCHLORIDE 25 MG: 25 TABLET, FILM COATED ORAL at 09:03

## 2018-03-24 NOTE — ASSESSMENT & PLAN NOTE
Pt converted to NSR  Cont BB, add Dilt 120mg if HR elevated or AF  Discuss with family regarding oral AC as pt has confusion/dementia  Rec PT/OT assessment for fall risk  Pt is high risk for CVA and does require a/c but continue ASA for now

## 2018-03-24 NOTE — SUBJECTIVE & OBJECTIVE
Past Medical History:   Diagnosis Date    Anemia     CAD (coronary artery disease)     Chronic back pain     Fibromyalgia     H/O aortic valve replacement 2010    History of breast cancer 1994    Right Lumpectomy    Hyperlipemia     Hypothyroid     Osteoarthritis     PVD (peripheral vascular disease)     Renal failure     Rheumatoid arteritis     S/P CABG (coronary artery bypass graft) 91,10    Syncope and collapse 07/2016    Vitamin D deficiency        Past Surgical History:   Procedure Laterality Date    ANKLE FRACTURE SURGERY      right foot    AORTIC VALVE REPLACEMENT  2010    APPENDECTOMY      APPENDECTOMY      BREAST LUMPECTOMY Right 1994    CORONARY ARTERY BYPASS GRAFT      HYSTERECTOMY      TONSILLECTOMY         Review of patient's allergies indicates:   Allergen Reactions    Codeine      Does not remember side effects    Corticosteroids (glucocorticoids)     Diovan hct [valsartan-hydrochlorothiazide]      May drowsy    Hydrocodone      Itching, nausea and vomiting.    Indomethacin     Iodine and iodide containing products      Rash and swelling    Lortab [hydrocodone-acetaminophen]     Omnicef [cefdinir]      Rash/itching    Oxycodone      Itching    Penicillins      Vaginal yeast infection       No current facility-administered medications on file prior to encounter.      Current Outpatient Prescriptions on File Prior to Encounter   Medication Sig    ACETAMINOPHEN (TYLENOL ARTHRITIS ORAL) Take by mouth once daily.    amLODIPine (NORVASC) 5 MG tablet Take 5 mg by mouth once daily.    atorvastatin (LIPITOR) 40 MG tablet TAKE ONE TABLET BY MOUTH ONCE DAILY    calcitRIOL (ROCALTROL) 0.25 MCG Cap Take 1 capsule by mouth once daily.    carvedilol (COREG) 25 MG tablet Take 1 tablet by mouth once daily.    ERGOCALCIFEROL, VITAMIN D2, (VITAMIN D2 ORAL) Take by mouth once daily.    hydrALAZINE (APRESOLINE) 25 MG tablet 25 mg 2 (two) times daily.     levothyroxine (SYNTHROID)  75 MCG tablet Take 1 tablet (75 mcg total) by mouth once daily.    zolpidem (AMBIEN) 10 mg Tab TAKE ONE TABLET BY MOUTH AT BEDTIME AS NEEDED     Family History     None        Social History Main Topics    Smoking status: Never Smoker    Smokeless tobacco: Never Used    Alcohol use No    Drug use: No    Sexual activity: Not Currently     Review of Systems   Unable to perform ROS: Mental status change     Objective:     Vital Signs (Most Recent):  Temp: 98.9 °F (37.2 °C) (03/23/18 1555)  Pulse: (!) 156 (03/24/18 0042)  Resp: (!) 22 (03/24/18 0042)  BP: (!) 161/78 (03/24/18 0030)  SpO2: 98 % (03/24/18 0042) Vital Signs (24h Range):  Temp:  [98.9 °F (37.2 °C)] 98.9 °F (37.2 °C)  Pulse:  [103-158] 156  Resp:  [18-26] 22  SpO2:  [93 %-98 %] 98 %  BP: (143-169)/(70-80) 161/78        There is no height or weight on file to calculate BMI.    Physical Exam   Constitutional: She appears well-developed. No distress.   Elderly, pleasant     HENT:   Head: Normocephalic and atraumatic.   Nose: Nose normal.   Eyes: Conjunctivae and EOM are normal. Pupils are equal, round, and reactive to light. No scleral icterus.   Neck: Normal range of motion. Neck supple. No tracheal deviation present.   Cardiovascular: Normal heart sounds and intact distal pulses.    No murmur heard.  Tachycardia  +2 bilateral lower ext edema     Pulmonary/Chest: Effort normal. No stridor. No respiratory distress. She has no wheezes. She has no rales.   Course throughout     Abdominal: Soft. Bowel sounds are normal. She exhibits no distension. There is no tenderness. There is no guarding.   Musculoskeletal: Normal range of motion. She exhibits no edema or deformity.   Neurological: She is alert. No cranial nerve deficit.   Oriented to person and situation. Confused at times  Follows commands     Skin: Skin is warm and dry. Capillary refill takes less than 2 seconds. No rash noted. She is not diaphoretic.   Psychiatric: She has a normal mood and affect.  Her behavior is normal.   No irritation,no agitation.    Nursing note and vitals reviewed.        CRANIAL NERVES     CN III, IV, VI   Pupils are equal, round, and reactive to light.  Extraocular motions are normal.        Significant Labs: All pertinent labs within the past 24 hours have been reviewed.  Results for orders placed or performed during the hospital encounter of 03/23/18   Comprehensive metabolic panel   Result Value Ref Range    Sodium 138 136 - 145 mmol/L    Potassium 3.8 3.5 - 5.1 mmol/L    Chloride 108 95 - 110 mmol/L    CO2 22 (L) 23 - 29 mmol/L    Glucose 125 (H) 70 - 110 mg/dL    BUN, Bld 29 (H) 8 - 23 mg/dL    Creatinine 2.0 (H) 0.5 - 1.4 mg/dL    Calcium 9.3 8.7 - 10.5 mg/dL    Total Protein 7.5 6.0 - 8.4 g/dL    Albumin 2.8 (L) 3.5 - 5.2 g/dL    Total Bilirubin 0.2 0.1 - 1.0 mg/dL    Alkaline Phosphatase 109 55 - 135 U/L    AST 13 10 - 40 U/L    ALT 7 (L) 10 - 44 U/L    Anion Gap 8 8 - 16 mmol/L    eGFR if African American 27 (A) >60 mL/min/1.73 m^2    eGFR if non African American 23 (A) >60 mL/min/1.73 m^2   CBC auto differential   Result Value Ref Range    WBC 8.75 3.90 - 12.70 K/uL    RBC 3.66 (L) 4.00 - 5.40 M/uL    Hemoglobin 9.6 (L) 12.0 - 16.0 g/dL    Hematocrit 31.1 (L) 37.0 - 48.5 %    MCV 85 82 - 98 fL    MCH 26.2 (L) 27.0 - 31.0 pg    MCHC 30.9 (L) 32.0 - 36.0 g/dL    RDW 18.0 (H) 11.5 - 14.5 %    Platelets 347 150 - 350 K/uL    MPV 10.1 9.2 - 12.9 fL    Gran # (ANC) 5.4 1.8 - 7.7 K/uL    Lymph # 2.0 1.0 - 4.8 K/uL    Mono # 1.0 0.3 - 1.0 K/uL    Eos # 0.3 0.0 - 0.5 K/uL    Baso # 0.06 0.00 - 0.20 K/uL    Gran% 61.8 38.0 - 73.0 %    Lymph% 22.6 18.0 - 48.0 %    Mono% 11.2 4.0 - 15.0 %    Eosinophil% 3.7 0.0 - 8.0 %    Basophil% 0.7 0.0 - 1.9 %    Differential Method Automated    Troponin I   Result Value Ref Range    Troponin I 0.040 (H) 0.000 - 0.026 ng/mL   Brain natriuretic peptide   Result Value Ref Range     (H) 0 - 99 pg/mL   Protime-INR   Result Value Ref Range     Prothrombin Time 10.1 9.0 - 12.5 sec    INR 1.0 0.8 - 1.2       Significant Imaging: I have reviewed all pertinent imaging results/findings within the past 24 hours.   Imaging Results          CT Head Without Contrast (Final result)  Result time 03/23/18 23:16:45    Final result by Kevyn De La Cruz MD (03/23/18 23:16:45)                 Impression:           1.  Negative for acute intracranial process. Negative for hemorrhage, or skull fracture.  2.  Cerebral atrophy, intracranial calcifications and small vessel ischemic changes noted.    3.  Stable findings as noted above.    All CT scans at this facility used dose modulation, iterative reconstruction, and/or weight based dosing when appropriate to reduce radiation dose to as low as reasonably achievable.      Electronically signed by: KEVYN DE LA CRUZ MD  Date:     03/23/18  Time:    23:16              Narrative:    Head CT scan without contrast    Clinical Indication: Confusion/delirium, altered LOC, unexplained .  Alteration of awareness    Findings:  Comparisons are made to 06/25/2016.   The ventricles are midline and the CSF spaces are prominent. The gray-white matter junction is well preserved. Negative for intracranial vascular abnormalities. Negative for mass, mass effect, cerebral edema, hemorrhage or abnormal fluid collections.  Intracranial calcifications and small vessel ischemic changes noted. There are falx calcifications.    The skull and scalp are intact.  Mild right maxillary sinus disease noted. The rest of the paranasal sinuses, mastoid air cells, middle ears and ear canals are clear. The globes are intact.  There are postoperative changes to the right globe.                             X-Ray Chest 1 View (Final result)  Result time 03/23/18 22:02:17    Final result by Kevyn De La Cruz MD (03/23/18 22:02:17)                 Impression:          1.  Moderate root interstitial changes in the lung bases with cardiac silhouette size enlarged.  Findings are  concerning for interstitial infectious process or interstitial pulmonary edema.  Clinical correlation is advised.  2.  Incidental findings as noted above.  Negative for acute process otherwise.      Electronically signed by: KEVYN DE LA CRUZ MD  Date:     03/23/18  Time:    22:02              Narrative:    Portable Chest x-ray    Clinical Indication: Transient alteration of awareness.  Patient is disoriented    Findings:     No comparison studies are available.  Moderate reticular interstitial changes to the lung bases with possible vascular congestion.  The lungs are free of alveolar opacities. The cardiac silhouette size is enlarged. The trachea is midline and the mediastinal width is normal. Negative for effusion or pneumothorax. Pulmonary vasculature is congested. Negative for osseous abnormalities. Convex left curvature of the thoracic spine. There are calcifications of the aortic knob and tortuosity of the descending thoracic aorta. There are degenerative changes of spine and both shoulder girdles. There are changes of a prior median sternotomy and CABG/prosthetic valve changes.  Postoperative changes in the right axilla.                             US Lower Extremity Veins Bilateral (Final result)  Result time 03/23/18 22:09:31    Final result by Kevyn De La Cruz MD (03/23/18 22:09:31)                 Impression:           1.  No evidence of right or left deep venous thrombosis.      Electronically signed by: KEVYN DE LA CRUZ MD  Date:     03/23/18  Time:    22:09              Narrative:    Bilateral lower extremity DVT ultrasound:    History: Pain and right leg.  Pain in left leg..  Lower extremity swelling.    Technique: Real-time, color, and duplex evaluation of the deep venous vessels in both lower extremities were performed.    Findings:   No comparison studies are available.  No evidence of deep venous thrombosis in either lower extremity. The deep venous vessels demonstrate normal spontaneous and augmented flow  with normal respiratory variation.  Deep venous vessels compress in a normal fashion throughout.

## 2018-03-24 NOTE — ED NOTES
Attempt made by ERT to place patient on bedpan for urine sample.  Patient unable to provide a sample at this time and is refusing an I&O catheter at this time.  Dr. Doty notified.

## 2018-03-24 NOTE — PROGRESS NOTES
Reviewed patient urine results. procal +, Crp+. Patient with +WBC and Leukocyte. Rocephin started. Urine culture added. Consider diflucan post antx as patient complains of vaginal yeast infection with antx treatments.

## 2018-03-24 NOTE — HPI
This is an 78 year old female pt with New onset AFIB, PMHx CAD s/p CABG/AVR, HLD, hypothyroidism, PVD, syncope, anemia presents for compliant of bilateral leg pain/swelling. Information obtained mostly from chart review. PT had swelling onset few days ago and continues to worsen. Reportedly swelling to her left leg normal, but her right leg has been swelling which has made if difficulty for pt to walk per daughter at bedside. Pt's family deny a hx of DVT or PE. Pt was also reportedly tested for dementia, reports negative per daughter. Walking exacerbates. Associated symptoms: worsening confusion. The patient was evaluated in the Er. Patient presented to Er in [facility] with no history. Over ER course ventricular rate worsened (max of 160s), improved and rate better controlled 100-110s post cardizem push given in Er. Hospital medicine was consulted for admission. CBC, CMP stable with baseline reviewed in care everywhere. No known baseline for troponin, today 0.040, . TSH normal. Mg/Phos neg. CT head impression: NAF. Chest Xray impression: Moderate root interstitial changes in the lung bases with cardiac silhouette size enlarged. Findings are concerning for interstitial infectious process or interstitial pulmonary edema. Patient afebrile, no wbc, no cough. Patient placed in obs for new onset afib, acute pulmonary edema.     Pt converted to NSR this AM, is drowsy but arousable, denies CP/SOB/palpiations. PT received IV lasix feels symptomatically better.

## 2018-03-24 NOTE — PLAN OF CARE
"Met with patient at bedside for Discharge Assessment.  Patient confused, not oriented to place or time and requests that this  speak with her daughter, Isis Stover, about her discharge needs.    Contacted patient's daughter, Isis @ [phone], with daughter stating that patient has been at home x 3 weeks from  SNF placement at [facility].  (Daughter reports that patient was at [facility] "for 100 days" following ankle surgery).  Daughter indicates that since patient has returned home, it has been extremely difficult to care for patient, as patient experiences extreme pain in her legs and is unable to assist family with transfers.  Daughter stating that she and brother (patient's son) care for patient together and that son is patient's POA.  (Daughter indicating that patient's son will return from out of town on Monday (03/26/18)); however, daughter is requesting that either SNF placement (should patient have any SNF days) or ICF level of care at [facility] be pursued for patient.  Patient Preference Form signed reflecting this preference.    PT/OT Eval requested and patient information faxed, via [email or web address], to [facility].  Notified NP for hospitalist that should SNF level of care for patient be recommended and patient has SNF days, then patient will need to be made inpatient as soon as possible    PLAN:  Will need to determine PT/OT recs; complete LOCET and 142; and determine whether [facility] will accept patient        03/24/18 2326   Discharge Assessment   Assessment Type Discharge Planning Assessment   Confirmed/corrected address and phone number on facesheet? Yes   Assessment information obtained from? Caregiver;Medical Record   Expected Length of Stay (days) (TBD)   Communicated expected length of stay with patient/caregiver no   Prior to hospitilization cognitive status: Unable to Assess   Prior to hospitalization functional status: Assistive " Equipment;Needs Assistance   Current cognitive status: Not Oriented to Place;Not Oriented to Time   Current Functional Status: Assistive Equipment;Needs Assistance   Lives With child(jonathan), adult  (Patient's daughter and son rotate in caring for/staying with patient 24/7 )   Able to Return to Prior Arrangements unable to determine at this time (comments)   Is patient able to care for self after discharge? No   Who are your caregiver(s) and their phone number(s)? Isis Stover, Daughter:  [phone]; Misael Early, Son:  [phone]   Patient's perception of discharge disposition skilled nursing facility;nursing home   Readmission Within The Last 30 Days no previous admission in last 30 days   Patient currently being followed by outpatient case management? No   Patient currently receives any other outside agency services? No   Equipment Currently Used at Home walker, rolling;wheelchair;bedside commode;bath bench   Do you have any problems affording any of your prescribed medications? No   Is the patient taking medications as prescribed? yes   Does the patient have transportation home? Yes   Transportation Available family or friend will provide;van, wheelchair accessible   Discharge Plan A Skilled Nursing Facility   Discharge Plan B New Nursing Home placement - residential care facility   Patient/Family In Agreement With Plan yes

## 2018-03-24 NOTE — ASSESSMENT & PLAN NOTE
O2  Cardizem IV Push and assess reponse, drip if need   now  moniotor troponin.   electrolytes, tsh: neg  Check procal, lactic, esr, crp, urine.   Cardiology consult

## 2018-03-24 NOTE — PT/OT/SLP EVAL
Occupational Therapy   Evaluation    Name: Anu Blanco  MRN: [id number]  Admitting Diagnosis:  Atrial fibrillation, new onset      Recommendations:     Discharge Recommendations: nursing facility, skilled  Discharge Equipment Recommendations:  none  Barriers to discharge:  None    History:     Occupational Profile:  Living Environment: lives with daughter in 1 story house  Previous level of function: (i) mwith adl's prior to illness apprx 1 week ago.  Roles and Routines: occupational thewapy  Equipment Owned:  rollator, bedside commode, bath bench  Assistance upon Discharge:     Past Medical History:   Diagnosis Date    Anemia     CAD (coronary artery disease)     Chronic back pain     Fibromyalgia     H/O aortic valve replacement 2010    History of breast cancer 1994    Right Lumpectomy    Hyperlipemia     Hypothyroid     Osteoarthritis     PVD (peripheral vascular disease)     Renal failure     Rheumatoid arteritis     S/P CABG (coronary artery bypass graft) 91,10    Syncope and collapse 07/2016    Vitamin D deficiency        Past Surgical History:   Procedure Laterality Date    ANKLE FRACTURE SURGERY      right foot    AORTIC VALVE REPLACEMENT  2010    APPENDECTOMY      APPENDECTOMY      BREAST LUMPECTOMY Right 1994    CORONARY ARTERY BYPASS GRAFT      HYSTERECTOMY      TONSILLECTOMY         Subjective     Chief Complaint: debility and generalized weakness  Patient/Family stated goals:   Communicated with: nurse Coyle and [facility] chart review prior to session.  Pain/Comfort:  · Pain Rating 1: 5/10  · Location - Side 1: Bilateral  · Location - Orientation 1: lower  · Location 1: foot    Patients cultural, spiritual, Baptism conflicts given the current situation:      Objective:     Patient found with: oxygen (2 liters)    General Precautions: Standard, fall   Orthopedic Precautions:N/A   Braces: N/A     Occupational Performance:    Bed Mobility:    · Patient completed Rolling/Turning to  Left with  minimum assistance  · Patient completed Rolling/Turning to Right with minimum assistance  · Patient completed Scooting/Bridging with minimum assistance  · Patient completed Supine to Sit with minimum assistance  · Patient completed Sit to Supine with minimum assistance    Functional Mobility/Transfers:  · Patient completed Sit <> Stand Transfer with moderate assistance  with  hand held assist   · Functional Mobility: pt ambulated a few steep r>l hand held assist with mod a and increase pain in b feet    Activities of Daily Living:  · LB Dressing: moderate assistance x1    Cognitive/Visual Perceptual:  Cognitive/Psychosocial Skills:     -       Oriented to: Person and Place   -       Follows Commands/attention:Follows one-step commands  -       Communication: clear/fluent  -       Memory: Poor immediate recall  -       Safety awareness/insight to disability: intact   Visual/Perceptual:      -Intact    Physical Exam:  Upper Extremity Range of Motion:     -       Right Upper Extremity: WFL  -       Left Upper Extremity: WFL  Upper Extremity Strength:    -       Right Upper Extremity: Deficits: mmt: 3/5 grossly  -       Left Upper Extremity: Deficits: mmt: 3/5 grossly   Strength:    -       Right Upper Extremity: Deficits: mmt: 3/5 grossly  -       Left Upper Extremity: Deficits: mmt: 3/5 grossly    Patient left HOB elevated with all lines intact, call button in reach, bed alarm on and nurse notified    AMPAC 6 Click:  AMPAC Total Score: 17    Treatment & Education:    Education:    Assessment:     Anu Blanco is a 78 y.o. female with a medical diagnosis of Atrial fibrillation, new onset.  She presents with Performance deficits affecting function are weakness, impaired functional mobilty, decreased safety awareness, impaired cognition, impaired endurance, gait instability, impaired balance, impaired self care skills, pain, edema.      Rehab Prognosis:  Fair+; patient would benefit from acute skilled  "OT services to address these deficits and reach maximum level of function.         Clinical Decision Makin.  OT Mod:  "Pt evaluation falls under moderate complexity for evaluation coding due to identification of 3-5 performance deficits noted as stated above. Eval required Min/Mod assistance to complete on this date and detailed assessment(s) were utilized. Moreover, an expanded review of history and occupational profile obtained with additional review of cognitive, physical and psychosocial hx."     Plan:     Patient to be seen 3 x/week to address the above listed problems via self-care/home management, therapeutic activities, therapeutic exercises  · Plan of Care Expires: 18  · Plan of Care Reviewed with: patient    This Plan of care has been discussed with the patient who was involved in its development and understands and is in agreement with the identified goals and treatment plan    GOALS:    Occupational Therapy Goals        Problem: Occupational Therapy Goal    Goal Priority Disciplines Outcome Interventions   Occupational Therapy Goal     OT, PT/OT Ongoing (interventions implemented as appropriate)                  Goals to met by 3-31-18  1. pt will tolerate set x 10 reps b ue rom exercise with min resistance  2. sba with le dressing  3. sba with sit<.stand t/f's  4. sba with toilet t/f's  Time Tracking:     OT Date of Treatment: 18  OT Start Time: 1555  OT Stop Time: 1625  OT Total Time (min): 30 min    Billable Minutes:Evaluation 15 minutes  Therapeutic Activity 15 minutes    Marietta Melendez OT  3/24/2018    "

## 2018-03-24 NOTE — ED NOTES
Dr. Doty notified that patient's rhythm is showing Afib with intermittent RVR.  According to family patient has no previous history of Afib and is not on blood thinners.  MD notified.  No new orders at this time.  Cardiac monitoring is in place.

## 2018-03-24 NOTE — HPI
Anu Blanco is a 78 y.o. female patient presents for compliant of bilateral leg pain/swelling. Onset few days ago and continues to worsen. Reportedly swelling to her left leg normal, but her right leg has been swelling which has made if difficulty for pt to walk per daughter at bedside. Pt's family deny a hx of DVT or PE. Pt was also reportedly tested for dementia, reports negative per daughter. Walking exacerbates. Associated symptoms: worsening confusion. The patient was evaluated in the Er. Patient presented to Er in [facility] with no history. Over ER course ventricular rate worsened (max of 160s), improved and rate better controlled 100-110s post cardizem push given in Er. Hospital medicine was consulted for admission. CBC, CMP stable with baseline reviewed in care everywhere. No known baseline for troponin, today 0.040, . TSH normal. Mg/Phos neg. CT head impression: NAF. Chest Xray impression: Moderate root interstitial changes in the lung bases with cardiac silhouette size enlarged. Findings are concerning for interstitial infectious process or interstitial pulmonary edema.  Clinical correlation is advised. Patient afebrile, no wbc, no cough. Patient placed in obs for new onset afib, acute pulmonary edema.

## 2018-03-24 NOTE — CONSULTS
[facility] -   Cardiology  Consult Note    Patient Name: Anu Blanco  MRN: [id number]  Admission Date: 3/23/2018  Hospital Length of Stay: 0 days  Code Status: Full Code   Attending Provider: Albert Willard MD   Consulting Provider: Yves Olivares Md, MD  Primary Care Physician: Serena Chu MD  Principal Problem:Atrial fibrillation, new onset    Patient information was obtained from patient, past medical records and ER records.     Inpatient consult to Cardiology  Consult performed by: YVES OLIVARES  Consult ordered by: CAMMIE PICKARD  Reason for consult: Afib, new onset        Subjective:     Chief Complaint:  Increased leg swelling     HPI:   This is an 78 year old female pt with New onset AFIB, PMHx CAD s/p CABG/AVR, HLD, hypothyroidism, PVD, syncope, anemia presents for compliant of bilateral leg pain/swelling. Information obtained mostly from chart review. PT had swelling onset few days ago and continues to worsen. Reportedly swelling to her left leg normal, but her right leg has been swelling which has made if difficulty for pt to walk per daughter at bedside. Pt's family deny a hx of DVT or PE. Pt was also reportedly tested for dementia, reports negative per daughter. Walking exacerbates. Associated symptoms: worsening confusion. The patient was evaluated in the Er. Patient presented to Er in [facility] with no history. Over ER course ventricular rate worsened (max of 160s), improved and rate better controlled 100-110s post cardizem push given in Er. Hospital medicine was consulted for admission. CBC, CMP stable with baseline reviewed in care everywhere. No known baseline for troponin, today 0.040, . TSH normal. Mg/Phos neg. CT head impression: NAF. Chest Xray impression: Moderate root interstitial changes in the lung bases with cardiac silhouette size enlarged. Findings are concerning for interstitial infectious process or interstitial pulmonary edema. Patient afebrile, no wbc, no cough.  Patient placed in obs for new onset afib, acute pulmonary edema.     Pt converted to NSR this AM, is drowsy but arousable, denies CP/SOB/palpiations. PT received IV lasix feels symptomatically better.     Past Medical History:   Diagnosis Date    Anemia     CAD (coronary artery disease)     Chronic back pain     Fibromyalgia     H/O aortic valve replacement 2010    History of breast cancer 1994    Right Lumpectomy    Hyperlipemia     Hypothyroid     Osteoarthritis     PVD (peripheral vascular disease)     Renal failure     Rheumatoid arteritis     S/P CABG (coronary artery bypass graft) 91,10    Syncope and collapse 07/2016    Vitamin D deficiency        Past Surgical History:   Procedure Laterality Date    ANKLE FRACTURE SURGERY      right foot    AORTIC VALVE REPLACEMENT  2010    APPENDECTOMY      APPENDECTOMY      BREAST LUMPECTOMY Right 1994    CORONARY ARTERY BYPASS GRAFT      HYSTERECTOMY      TONSILLECTOMY         Review of patient's allergies indicates:   Allergen Reactions    Codeine      Does not remember side effects    Corticosteroids (glucocorticoids)     Diovan hct [valsartan-hydrochlorothiazide]      May drowsy    Hydrocodone      Itching, nausea and vomiting.    Indomethacin     Iodine and iodide containing products      Rash and swelling    Lortab [hydrocodone-acetaminophen]     Omnicef [cefdinir]      Rash/itching    Oxycodone      Itching    Penicillins      Vaginal yeast infection       No current facility-administered medications on file prior to encounter.      Current Outpatient Prescriptions on File Prior to Encounter   Medication Sig    ACETAMINOPHEN (TYLENOL ARTHRITIS ORAL) Take by mouth once daily.    amLODIPine (NORVASC) 5 MG tablet Take 5 mg by mouth once daily.    atorvastatin (LIPITOR) 40 MG tablet TAKE ONE TABLET BY MOUTH ONCE DAILY    calcitRIOL (ROCALTROL) 0.25 MCG Cap Take 1 capsule by mouth once daily.    carvedilol (COREG) 25 MG tablet Take 1  tablet by mouth once daily.    ERGOCALCIFEROL, VITAMIN D2, (VITAMIN D2 ORAL) Take by mouth once daily.    hydrALAZINE (APRESOLINE) 25 MG tablet 25 mg 2 (two) times daily.     levothyroxine (SYNTHROID) 75 MCG tablet Take 1 tablet (75 mcg total) by mouth once daily.    zolpidem (AMBIEN) 10 mg Tab TAKE ONE TABLET BY MOUTH AT BEDTIME AS NEEDED     Family History     None        Social History Main Topics    Smoking status: Never Smoker    Smokeless tobacco: Never Used    Alcohol use No    Drug use: No    Sexual activity: Not Currently     Review of Systems   Constitution: Negative.   HENT: Negative.    Eyes: Negative.    Cardiovascular: Negative.    Respiratory: Positive for shortness of breath.    Endocrine: Negative.    Skin: Negative.    Musculoskeletal: Negative.    Gastrointestinal: Negative.    Genitourinary: Negative.    Neurological: Negative.    Psychiatric/Behavioral: Negative.    Allergic/Immunologic: Negative.      Objective:     Vital Signs (Most Recent):  Temp: 98.2 °F (36.8 °C) (03/24/18 1121)  Pulse: 85 (03/24/18 1121)  Resp: 18 (03/24/18 1121)  BP: 138/63 (03/24/18 1121)  SpO2: 97 % (03/24/18 1121) Vital Signs (24h Range):  Temp:  [97.7 °F (36.5 °C)-98.9 °F (37.2 °C)] 98.2 °F (36.8 °C)  Pulse:  [] 85  Resp:  [18-26] 18  SpO2:  [92 %-100 %] 97 %  BP: (128-169)/(58-85) 138/63     Weight: 81 kg (178 lb 9.2 oz)  Body mass index is 32.66 kg/m².    SpO2: 97 %  O2 Device (Oxygen Therapy): nasal cannula    No intake or output data in the 24 hours ending 03/24/18 1143    Lines/Drains/Airways     Peripheral Intravenous Line                 Peripheral IV - Single Lumen 03/23/18 2123 Left Antecubital less than 1 day                Physical Exam   Constitutional: She is oriented to person, place, and time. She appears well-developed and well-nourished. No distress.   HENT:   Head: Normocephalic and atraumatic.   Nose: Nose normal.   Mouth/Throat: Oropharynx is clear and moist.   Eyes: Conjunctivae and  EOM are normal. No scleral icterus.   Neck: Normal range of motion. Neck supple. JVD present. No thyromegaly present.   Cardiovascular: Normal rate, regular rhythm, S1 normal and S2 normal.  Exam reveals no gallop, no S3, no S4 and no friction rub.    Murmur heard.  Pulmonary/Chest: Effort normal. No stridor. No respiratory distress. She has no wheezes. She has rales. She exhibits no tenderness.   Abdominal: Soft. Bowel sounds are normal. She exhibits no distension and no mass. There is no tenderness. There is no rebound.   Genitourinary:   Genitourinary Comments: Deferred   Musculoskeletal: Normal range of motion. She exhibits edema. She exhibits no tenderness or deformity.   Lymphadenopathy:     She has no cervical adenopathy.   Neurological: She is alert and oriented to person, place, and time. She exhibits normal muscle tone. Coordination normal.   Skin: Skin is warm and dry. No rash noted. She is not diaphoretic. No erythema. No pallor.   Psychiatric: She has a normal mood and affect. Her behavior is normal. Judgment and thought content normal.   Nursing note and vitals reviewed.      Significant Labs:   All pertinent lab results from the last 24 hours have been reviewed. and   Recent Lab Results       03/24/18  0543 03/24/18  0542 03/24/18  0152 03/24/18  0102 03/23/18  2123      Benzodiazepines   Negative       Methadone metabolites   Negative       Phencyclidine   Negative       Procalcitonin    0.35  Comment:  A concentration < 0.25 ng/mL represents a low risk bacterial   infection.  Procalcitonin may not be accurate among patients with localized   infection, recent trauma or major surgery, immunosuppressed state,   invasive fungal infection, renal dysfunction. Decisions regarding   initiation or continuation of antibiotic therapy should not be based   solely on procalcitonin levels.  (H)      Albumin     2.8(L)     Alkaline Phosphatase     109     ALT     7(L)     Amphetamine Screen, Ur   Negative        Anion Gap 11    8     Aniso  Moderate          Moderate        Appearance, UA   Clear       AST     13     Bacteria, UA   Few(A)       BANDS  2.0          2.0        Barbiturate Screen, Ur   Negative       Baso #     0.06     Basophil%  1.0   0.7       1.0        Bilirubin (UA)   Negative       Total Bilirubin     0.2  Comment:  For infants and newborns, interpretation of results should be based  on gestational age, weight and in agreement with clinical  observations.  Premature Infant recommended reference ranges:  Up to 24 hours.............<8.0 mg/dL  Up to 48 hours............<12.0 mg/dL  3-5 days..................<15.0 mg/dL  6-29 days.................<15.0 mg/dL       BNP     360  Comment:  Values of less than 100 pg/ml are consistent with non-CHF populations.(H)     BUN, Bld 28(H)    29(H)     Calcium 9.7    9.3     Chloride 107    108     CO2 21(L)    22(L)     Cocaine (Metab.)   Negative       Color, UA   Yellow       Creatinine 1.9(H)    2.0(H)     Creatinine, Random Ur   59.9  Comment:  The random urine reference ranges provided were established   for 24 hour urine collections.  No reference ranges exist for  random urine specimens.  Correlate clinically.         CRP    78.4(H)      Differential Method  Manual   Automated       Manual        eGFR if  28(A)    27(A)     eGFR if non  25  Comment:  Calculation used to obtain the estimated glomerular filtration  rate (eGFR) is the CKD-EPI equation.   (A)    23  Comment:  Calculation used to obtain the estimated glomerular filtration  rate (eGFR) is the CKD-EPI equation.   (A)     Eos #     0.3     Eosinophil%  3.0   3.7       3.0        Large/Giant Platelets  Present          Present        Glucose 89    125(H)     Glucose, UA   Negative       Gran # (ANC)     5.4     Gran%  80.0(H)   61.8       80.0(H)        Hematocrit  32.3(L)   31.1(L)       32.3(L)        Hemoglobin  9.8(L)   9.6(L)       9.8(L)        Coumadin Monitoring  INR     1.0  Comment:  Coumadin Therapy:  2.0 - 3.0 for INR for all indicators except mechanical heart valves  and antiphospholipid syndromes which should use 2.5 - 3.5.       Ketones, UA   Negative       Lactate, Ruddy    0.8  Comment:  Falsely low lactic acid results can be found in samples   containing >=13.0 mg/dL total bilirubin and/or >=3.5 mg/dL   direct bilirubin.        Leukocytes, UA   1+(A)       Lymph #     2.0     Lymph%  8.0(L)   22.6       8.0(L)        Magnesium     2.1     MCH  26.0(L)   26.2(L)       26.0(L)        MCHC  30.3(L)   30.9(L)       30.3(L)        MCV  86   85       86        Microscopic Comment   SEE COMMENT  Comment:  Other formed elements not mentioned in the report are not   present in the microscopic examination.          Mono #     1.0     Mono%  6.0   11.2       6.0        MPV  10.5   10.1       10.5        Nitrite, UA   Negative       Occult Blood UA   Negative       Opiate Scrn, Ur   Presumptive Positive       pH, UA   6.0       Phosphorus     3.8     Platelet Estimate  Appears normal          Appears normal        Platelets  318   347       318        Potassium 3.8    3.8     Total Protein     7.5     Protein, UA   Trace  Comment:  Recommend a 24 hour urine protein or a urine   protein/creatinine ratio if globulin induced proteinuria is  clinically suspected.  (A)       Protime     10.1     RBC  3.77(L)   3.66(L)       3.77(L)        RBC, UA   3       RDW  18.3(H)   18.0(H)       18.3(H)        Sodium 139    138     Specific Gravity, UA   1.020       Specimen UA   Urine, Clean Catch       Stomatocytes  Present          Present        Target Cells  Occasional          Occasional        Tear Drop Cells  Occasional          Occasional        Marijuana (THC) Metabolite   Negative       Toxic Granulation  Present          Present        Toxicology Information   SEE COMMENT  Comment:  This screen includes the following classes of drugs at the   listed cut-off:  Benzodiazepines                   200 ng/ml  Methadone                        300 ng/ml  Cocaine metabolite               300 ng/ml  Opiates                          300 ng/ml  Barbiturates                     200 ng/ml  Amphetamines                    1000 ng/ml  Marijuana metabs (THC)            50 ng/ml  Phencyclidine (PCP)               25 ng/ml  High concentrations of Diphenhydramine may cross-react with  Phencyclidine PCP screening immunoassay giving a false   positive result.  High concentrations of Methylenedioxymethamphetamine (MDMA aka  Ectasy) and other structurally similar compounds may cross-   react with the Amphetamine/Methamphetamine screening   immunoassay giving a false positive result.  A metabolite of the anti-HIV drug Sustiva () may cause  false positive results in the Marijuana metabolite (THC)   screening assay.  Note: This exception list includes only more common   interferants in toxicology screen testing.  Because of many   cross-reactantspositive results on toxicology drug screens   should be confirmed whenever results do not correlate with   clinical presentation.  This report is intended for use in clinical monitoring and  management of patients. It is not intended for use in   employment related drug testing.  Because of any cross-reactants, positive results on toxicology  drug screens should be confirmed whenever results do not  correlate with clinical presentation.  Presumptive positive results are unconfirmed and may be used   only for medical purposes.         Troponin I 0.050  Comment:  The reference interval for Troponin I represents the 99th percentile   cutoff   for our facility and is consistent with 3rd generation assay   performance.  (H)    0.040  Comment:  The reference interval for Troponin I represents the 99th percentile   cutoff   for our facility and is consistent with 3rd generation assay   performance.  (H)     TSH     1.468     Urobilinogen, UA   Negative       Vacuolated  Granulocytes  Present          Present        WBC, UA   25(H)       WBC  9.99   8.75       9.99        Yeast, UA   Rare(A)                       Significant Imaging: Echocardiogram: 2D echo with color flow doppler: No results found for this or any previous visit. and X-Ray: CXR: X-Ray Chest 1 View (CXR):   Results for orders placed or performed during the hospital encounter of 03/23/18   X-Ray Chest 1 View    Narrative    Portable Chest x-ray    Clinical Indication: Transient alteration of awareness.  Patient is disoriented    Findings:     No comparison studies are available.  Moderate reticular interstitial changes to the lung bases with possible vascular congestion.  The lungs are free of alveolar opacities. The cardiac silhouette size is enlarged. The trachea is midline and the mediastinal width is normal. Negative for effusion or pneumothorax. Pulmonary vasculature is congested. Negative for osseous abnormalities. Convex left curvature of the thoracic spine. There are calcifications of the aortic knob and tortuosity of the descending thoracic aorta. There are degenerative changes of spine and both shoulder girdles. There are changes of a prior median sternotomy and CABG/prosthetic valve changes.  Postoperative changes in the right axilla.    Impression          1.  Moderate root interstitial changes in the lung bases with cardiac silhouette size enlarged.  Findings are concerning for interstitial infectious process or interstitial pulmonary edema.  Clinical correlation is advised.  2.  Incidental findings as noted above.  Negative for acute process otherwise.      Electronically signed by: ALKA VELASQUEZ MD  Date:     03/23/18  Time:    22:02      Assessment and Plan:     * Atrial fibrillation, new onset    Pt converted to NSR  Cont BB, add Dilt 120mg if HR elevated or AF  Discuss with family regarding oral AC as pt has confusion/dementia  Rec PT/OT assessment for fall risk  Pt is high risk for CVA and does  require a/c but continue ASA for now        Elevated troponin    Likely secondary to demand ischemia in setting of rapid AF  Cont trend enzymes  Cont asa, statin, bb  Check 2D ECHO        Acute cystitis without hematuria    Cont abx per primary team        Acute pulmonary edema    Cont IV Diureses  Keep K > 4, Mg > 2        Essential hypertension    Cont meds and titrate        Hyperlipemia    Cont statin        H/O aortic valve replacement    Check 2D ECHO  Obtain records        CAD (coronary artery disease)    Cont asa, statin, bb  No chest pain  Trend cardiac enzymes till peak            VTE Risk Mitigation         Ordered     heparin (porcine) injection 5,000 Units  Every 8 hours     Route:  Subcutaneous        03/24/18 0055     IP VTE HIGH RISK PATIENT  Once      03/24/18 0036          Thank you for your consult. I will follow-up with patient. Please contact us if you have any additional questions.    Aman Adair Md, MD  Cardiology   [facility] -

## 2018-03-24 NOTE — ASSESSMENT & PLAN NOTE
Likely secondary to demand ischemia in setting of rapid AF  Cont trend enzymes  Cont asa, statin, bb  Check 2D ECHO

## 2018-03-24 NOTE — ED NOTES
Dr. Crisostomo notified of patient's increased HR.  Now sustained in the 150's.  MD at bedside to assess patient.

## 2018-03-24 NOTE — PLAN OF CARE
Problem: Patient Care Overview  Goal: Plan of Care Review  Outcome: Ongoing (interventions implemented as appropriate)  Patient 's spo2 is better and breath sound clear.

## 2018-03-24 NOTE — SUBJECTIVE & OBJECTIVE
Past Medical History:   Diagnosis Date    Anemia     CAD (coronary artery disease)     Chronic back pain     Fibromyalgia     H/O aortic valve replacement 2010    History of breast cancer 1994    Right Lumpectomy    Hyperlipemia     Hypothyroid     Osteoarthritis     PVD (peripheral vascular disease)     Renal failure     Rheumatoid arteritis     S/P CABG (coronary artery bypass graft) 91,10    Syncope and collapse 07/2016    Vitamin D deficiency        Past Surgical History:   Procedure Laterality Date    ANKLE FRACTURE SURGERY      right foot    AORTIC VALVE REPLACEMENT  2010    APPENDECTOMY      APPENDECTOMY      BREAST LUMPECTOMY Right 1994    CORONARY ARTERY BYPASS GRAFT      HYSTERECTOMY      TONSILLECTOMY         Review of patient's allergies indicates:   Allergen Reactions    Codeine      Does not remember side effects    Corticosteroids (glucocorticoids)     Diovan hct [valsartan-hydrochlorothiazide]      May drowsy    Hydrocodone      Itching, nausea and vomiting.    Indomethacin     Iodine and iodide containing products      Rash and swelling    Lortab [hydrocodone-acetaminophen]     Omnicef [cefdinir]      Rash/itching    Oxycodone      Itching    Penicillins      Vaginal yeast infection       No current facility-administered medications on file prior to encounter.      Current Outpatient Prescriptions on File Prior to Encounter   Medication Sig    ACETAMINOPHEN (TYLENOL ARTHRITIS ORAL) Take by mouth once daily.    amLODIPine (NORVASC) 5 MG tablet Take 5 mg by mouth once daily.    atorvastatin (LIPITOR) 40 MG tablet TAKE ONE TABLET BY MOUTH ONCE DAILY    calcitRIOL (ROCALTROL) 0.25 MCG Cap Take 1 capsule by mouth once daily.    carvedilol (COREG) 25 MG tablet Take 1 tablet by mouth once daily.    ERGOCALCIFEROL, VITAMIN D2, (VITAMIN D2 ORAL) Take by mouth once daily.    hydrALAZINE (APRESOLINE) 25 MG tablet 25 mg 2 (two) times daily.     levothyroxine (SYNTHROID)  75 MCG tablet Take 1 tablet (75 mcg total) by mouth once daily.    zolpidem (AMBIEN) 10 mg Tab TAKE ONE TABLET BY MOUTH AT BEDTIME AS NEEDED     Family History     None        Social History Main Topics    Smoking status: Never Smoker    Smokeless tobacco: Never Used    Alcohol use No    Drug use: No    Sexual activity: Not Currently     Review of Systems   Constitution: Negative.   HENT: Negative.    Eyes: Negative.    Cardiovascular: Negative.    Respiratory: Positive for shortness of breath.    Endocrine: Negative.    Skin: Negative.    Musculoskeletal: Negative.    Gastrointestinal: Negative.    Genitourinary: Negative.    Neurological: Negative.    Psychiatric/Behavioral: Negative.    Allergic/Immunologic: Negative.      Objective:     Vital Signs (Most Recent):  Temp: 98.2 °F (36.8 °C) (03/24/18 1121)  Pulse: 85 (03/24/18 1121)  Resp: 18 (03/24/18 1121)  BP: 138/63 (03/24/18 1121)  SpO2: 97 % (03/24/18 1121) Vital Signs (24h Range):  Temp:  [97.7 °F (36.5 °C)-98.9 °F (37.2 °C)] 98.2 °F (36.8 °C)  Pulse:  [] 85  Resp:  [18-26] 18  SpO2:  [92 %-100 %] 97 %  BP: (128-169)/(58-85) 138/63     Weight: 81 kg (178 lb 9.2 oz)  Body mass index is 32.66 kg/m².    SpO2: 97 %  O2 Device (Oxygen Therapy): nasal cannula    No intake or output data in the 24 hours ending 03/24/18 1143    Lines/Drains/Airways     Peripheral Intravenous Line                 Peripheral IV - Single Lumen 03/23/18 2123 Left Antecubital less than 1 day                Physical Exam   Constitutional: She is oriented to person, place, and time. She appears well-developed and well-nourished. No distress.   HENT:   Head: Normocephalic and atraumatic.   Nose: Nose normal.   Mouth/Throat: Oropharynx is clear and moist.   Eyes: Conjunctivae and EOM are normal. No scleral icterus.   Neck: Normal range of motion. Neck supple. JVD present. No thyromegaly present.   Cardiovascular: Normal rate, regular rhythm, S1 normal and S2 normal.  Exam reveals  no gallop, no S3, no S4 and no friction rub.    Murmur heard.  Pulmonary/Chest: Effort normal. No stridor. No respiratory distress. She has no wheezes. She has rales. She exhibits no tenderness.   Abdominal: Soft. Bowel sounds are normal. She exhibits no distension and no mass. There is no tenderness. There is no rebound.   Genitourinary:   Genitourinary Comments: Deferred   Musculoskeletal: Normal range of motion. She exhibits edema. She exhibits no tenderness or deformity.   Lymphadenopathy:     She has no cervical adenopathy.   Neurological: She is alert and oriented to person, place, and time. She exhibits normal muscle tone. Coordination normal.   Skin: Skin is warm and dry. No rash noted. She is not diaphoretic. No erythema. No pallor.   Psychiatric: She has a normal mood and affect. Her behavior is normal. Judgment and thought content normal.   Nursing note and vitals reviewed.      Significant Labs:   All pertinent lab results from the last 24 hours have been reviewed. and   Recent Lab Results       03/24/18  0543 03/24/18  0542 03/24/18  0152 03/24/18  0102 03/23/18  2123      Benzodiazepines   Negative       Methadone metabolites   Negative       Phencyclidine   Negative       Procalcitonin    0.35  Comment:  A concentration < 0.25 ng/mL represents a low risk bacterial   infection.  Procalcitonin may not be accurate among patients with localized   infection, recent trauma or major surgery, immunosuppressed state,   invasive fungal infection, renal dysfunction. Decisions regarding   initiation or continuation of antibiotic therapy should not be based   solely on procalcitonin levels.  (H)      Albumin     2.8(L)     Alkaline Phosphatase     109     ALT     7(L)     Amphetamine Screen, Ur   Negative       Anion Gap 11    8     Aniso  Moderate          Moderate        Appearance, UA   Clear       AST     13     Bacteria, UA   Few(A)       BANDS  2.0          2.0        Barbiturate Screen, Ur   Negative        Baso #     0.06     Basophil%  1.0   0.7       1.0        Bilirubin (UA)   Negative       Total Bilirubin     0.2  Comment:  For infants and newborns, interpretation of results should be based  on gestational age, weight and in agreement with clinical  observations.  Premature Infant recommended reference ranges:  Up to 24 hours.............<8.0 mg/dL  Up to 48 hours............<12.0 mg/dL  3-5 days..................<15.0 mg/dL  6-29 days.................<15.0 mg/dL       BNP     360  Comment:  Values of less than 100 pg/ml are consistent with non-CHF populations.(H)     BUN, Bld 28(H)    29(H)     Calcium 9.7    9.3     Chloride 107    108     CO2 21(L)    22(L)     Cocaine (Metab.)   Negative       Color, UA   Yellow       Creatinine 1.9(H)    2.0(H)     Creatinine, Random Ur   59.9  Comment:  The random urine reference ranges provided were established   for 24 hour urine collections.  No reference ranges exist for  random urine specimens.  Correlate clinically.         CRP    78.4(H)      Differential Method  Manual   Automated       Manual        eGFR if  28(A)    27(A)     eGFR if non  25  Comment:  Calculation used to obtain the estimated glomerular filtration  rate (eGFR) is the CKD-EPI equation.   (A)    23  Comment:  Calculation used to obtain the estimated glomerular filtration  rate (eGFR) is the CKD-EPI equation.   (A)     Eos #     0.3     Eosinophil%  3.0   3.7       3.0        Large/Giant Platelets  Present          Present        Glucose 89    125(H)     Glucose, UA   Negative       Gran # (ANC)     5.4     Gran%  80.0(H)   61.8       80.0(H)        Hematocrit  32.3(L)   31.1(L)       32.3(L)        Hemoglobin  9.8(L)   9.6(L)       9.8(L)        Coumadin Monitoring INR     1.0  Comment:  Coumadin Therapy:  2.0 - 3.0 for INR for all indicators except mechanical heart valves  and antiphospholipid syndromes which should use 2.5 - 3.5.       Ketones, UA   Negative        Lactate, Ruddy    0.8  Comment:  Falsely low lactic acid results can be found in samples   containing >=13.0 mg/dL total bilirubin and/or >=3.5 mg/dL   direct bilirubin.        Leukocytes, UA   1+(A)       Lymph #     2.0     Lymph%  8.0(L)   22.6       8.0(L)        Magnesium     2.1     MCH  26.0(L)   26.2(L)       26.0(L)        MCHC  30.3(L)   30.9(L)       30.3(L)        MCV  86   85       86        Microscopic Comment   SEE COMMENT  Comment:  Other formed elements not mentioned in the report are not   present in the microscopic examination.          Mono #     1.0     Mono%  6.0   11.2       6.0        MPV  10.5   10.1       10.5        Nitrite, UA   Negative       Occult Blood UA   Negative       Opiate Scrn, Ur   Presumptive Positive       pH, UA   6.0       Phosphorus     3.8     Platelet Estimate  Appears normal          Appears normal        Platelets  318   347       318        Potassium 3.8    3.8     Total Protein     7.5     Protein, UA   Trace  Comment:  Recommend a 24 hour urine protein or a urine   protein/creatinine ratio if globulin induced proteinuria is  clinically suspected.  (A)       Protime     10.1     RBC  3.77(L)   3.66(L)       3.77(L)        RBC, UA   3       RDW  18.3(H)   18.0(H)       18.3(H)        Sodium 139    138     Specific Gravity, UA   1.020       Specimen UA   Urine, Clean Catch       Stomatocytes  Present          Present        Target Cells  Occasional          Occasional        Tear Drop Cells  Occasional          Occasional        Marijuana (THC) Metabolite   Negative       Toxic Granulation  Present          Present        Toxicology Information   SEE COMMENT  Comment:  This screen includes the following classes of drugs at the   listed cut-off:  Benzodiazepines                  200 ng/ml  Methadone                        300 ng/ml  Cocaine metabolite               300 ng/ml  Opiates                          300 ng/ml  Barbiturates                     200  ng/ml  Amphetamines                    1000 ng/ml  Marijuana metabs (THC)            50 ng/ml  Phencyclidine (PCP)               25 ng/ml  High concentrations of Diphenhydramine may cross-react with  Phencyclidine PCP screening immunoassay giving a false   positive result.  High concentrations of Methylenedioxymethamphetamine (MDMA aka  Ectasy) and other structurally similar compounds may cross-   react with the Amphetamine/Methamphetamine screening   immunoassay giving a false positive result.  A metabolite of the anti-HIV drug Sustiva () may cause  false positive results in the Marijuana metabolite (THC)   screening assay.  Note: This exception list includes only more common   interferants in toxicology screen testing.  Because of many   cross-reactantspositive results on toxicology drug screens   should be confirmed whenever results do not correlate with   clinical presentation.  This report is intended for use in clinical monitoring and  management of patients. It is not intended for use in   employment related drug testing.  Because of any cross-reactants, positive results on toxicology  drug screens should be confirmed whenever results do not  correlate with clinical presentation.  Presumptive positive results are unconfirmed and may be used   only for medical purposes.         Troponin I 0.050  Comment:  The reference interval for Troponin I represents the 99th percentile   cutoff   for our facility and is consistent with 3rd generation assay   performance.  (H)    0.040  Comment:  The reference interval for Troponin I represents the 99th percentile   cutoff   for our facility and is consistent with 3rd generation assay   performance.  (H)     TSH     1.468     Urobilinogen, UA   Negative       Vacuolated Granulocytes  Present          Present        WBC, UA   25(H)       WBC  9.99   8.75       9.99        Yeast, UA   Rare(A)                       Significant Imaging: Echocardiogram: 2D echo with color  flow doppler: No results found for this or any previous visit. and X-Ray: CXR: X-Ray Chest 1 View (CXR):   Results for orders placed or performed during the hospital encounter of 03/23/18   X-Ray Chest 1 View    Narrative    Portable Chest x-ray    Clinical Indication: Transient alteration of awareness.  Patient is disoriented    Findings:     No comparison studies are available.  Moderate reticular interstitial changes to the lung bases with possible vascular congestion.  The lungs are free of alveolar opacities. The cardiac silhouette size is enlarged. The trachea is midline and the mediastinal width is normal. Negative for effusion or pneumothorax. Pulmonary vasculature is congested. Negative for osseous abnormalities. Convex left curvature of the thoracic spine. There are calcifications of the aortic knob and tortuosity of the descending thoracic aorta. There are degenerative changes of spine and both shoulder girdles. There are changes of a prior median sternotomy and CABG/prosthetic valve changes.  Postoperative changes in the right axilla.    Impression          1.  Moderate root interstitial changes in the lung bases with cardiac silhouette size enlarged.  Findings are concerning for interstitial infectious process or interstitial pulmonary edema.  Clinical correlation is advised.  2.  Incidental findings as noted above.  Negative for acute process otherwise.      Electronically signed by: ALKA VELASQUEZ MD  Date:     03/23/18  Time:    22:02

## 2018-03-24 NOTE — ED NOTES
Patient's daughter at bedside states that patient's BLE have been swollen for past week, and are now too painful to walk on.  +2 edema noted to BLE with left leg appearing larger in size.  She also states that patient has been increasingly weak and fatigued.  Daughter states patient has been eating and drinking as usual.  Also, patient has had a great memory loss and confusion in the past 2-3 days.  Daughter states patient has forgotten the identity of her other daughter and has been speaking in Green[place]ic almost exclusively.  Patient's son at bedside states they have had patient tested for dementia and a recent MRI came back negative.

## 2018-03-24 NOTE — HOSPITAL COURSE
3/24/18 - Pt converted to NSR, IV diuretics started. Denies CP/shortness of breath/palpitations.   3/25/18 - Pt seated in chair, comfortable, denies CP/SOB/palpitations, remains in NSR.

## 2018-03-24 NOTE — PROGRESS NOTES
Anu Blanco is a 78 year old female admitted for new onset A-fib. Pt seen and examined. Pt currently in SR s/p cardizem 10 mg IVP x 1 in ED. A-fib may be secondary to UTI and/or pulmonary edema. Cardiology consulted -- high risk for CVA, requires anticoagulation but will continue ASA for now. Will continue to diurese, pt reports SOB improved.    Age 2 - 75 years old or older   CHF History 0 - no   HTN History 1 - yes   Stroke/TIA/Thromboembolism History 0 - no   Vascular Disease History 1 - yes   Diabetes Mellitus in history 0 - no   Female 1 - yes   [id number] VASc Scale Total Score 5     Pt currently receiving IV Rocephin for UTI. Urine culture pending. Pt reports having dysuria and urinary urgency. Pt oriented to person and place currently. BBS are clear and equal to auscultation, no wheezing, rhonchi noted. Pt denies chest pain, SOB. Abdominal assessment unremarkable. Pt c/o mild dysuria. Will get PT/OT to evaluate and treat -- pt was seen by her PCP on 11/11/17 for follow up for a fall on 10/27/17. Home health was set up but PCP did mention she may need inpatient rehab if cannot be managed. Will continue to monitor

## 2018-03-24 NOTE — ED PROVIDER NOTES
SCRIBE #1 NOTE: I, Henry Pineda, am scribing for, and in the presence of, Paolo Doty MD. I have scribed the entire note.      History      Chief Complaint   Patient presents with    Leg Pain     bilateral leg pain, progressed from feet to thighs over the past few days       Review of patient's allergies indicates:   Allergen Reactions    Codeine      Does not remember side effects    Corticosteroids (glucocorticoids)     Diovan hct [valsartan-hydrochlorothiazide]      May drowsy    Hydrocodone      Itching, nausea and vomiting.    Indomethacin     Iodine and iodide containing products      Rash and swelling    Lortab [hydrocodone-acetaminophen]     Omnicef [cefdinir]      Rash/itching    Oxycodone      Itching    Penicillins      Vaginal yeast infection        HPI   HPI    3/23/2018, 8:31 PM   History obtained from the daughter and patient      History of Present Illness: Anu Blanco is a 78 y.o. female patient who presents to the Emergency Department for an evaluation of bilateral leg pain/swelling which onset gradually a few days ago. Pt was brought in by her family for an evaluation of worsening swelling to her legs as well as increased confusion. Pt reportedly has swelling to her left leg normal, but her right leg has been swelling which has made if difficulty for pt to walk. Pt's family deny a hx of DVT or PE. Pt was also reportedly tested for dementia, but the MRI came back negative. Symptoms are constant and moderate in severity. Exacerbated by bearing weight and relieved by nothing. Associated sxs include confusion and gait problem. Patient denies any fever, chills, weakness/numbness, fall, back pain, neck pain, and all other sxs at this time. Pt denies tx PTA. No further complaints or concerns at this time.         Arrival mode: Personal vehicle     PCP: Serena Chu MD       Past Medical History:  Past Medical History:   Diagnosis Date    Anemia     CAD (coronary artery  disease)     Chronic back pain     Fibromyalgia     H/O aortic valve replacement 2010    History of breast cancer 1994    Right Lumpectomy    Hyperlipemia     Hypothyroid     Osteoarthritis     PVD (peripheral vascular disease)     Renal failure     Rheumatoid arteritis     S/P CABG (coronary artery bypass graft) 91,10    Syncope and collapse 07/2016    Vitamin D deficiency        Past Surgical History:  Past Surgical History:   Procedure Laterality Date    ANKLE FRACTURE SURGERY      right foot    AORTIC VALVE REPLACEMENT  2010    APPENDECTOMY      APPENDECTOMY      BREAST LUMPECTOMY Right 1994    CORONARY ARTERY BYPASS GRAFT      HYSTERECTOMY      TONSILLECTOMY           Family History:  Family history reviewed not relevant      Social History:  Social History     Social History Main Topics    Smoking status: Never Smoker    Smokeless tobacco: Never Used    Alcohol use No    Drug use: No    Sexual activity: Not Currently       ROS   Review of Systems   Constitutional: Negative for chills, diaphoresis and fever.        (-) Fall  (-) Head trauma   HENT: Negative for congestion and sore throat.    Respiratory: Negative for cough and shortness of breath.    Cardiovascular: Positive for leg swelling. Negative for chest pain and palpitations.   Gastrointestinal: Negative for nausea and vomiting.   Genitourinary: Negative for dysuria, frequency, hematuria and urgency.   Musculoskeletal: Positive for gait problem. Negative for back pain and neck pain.        (+) Bilateral leg pain   Skin: Negative for rash and wound.   Neurological: Negative for weakness, light-headedness and headaches.   Hematological: Does not bruise/bleed easily.   Psychiatric/Behavioral: Positive for confusion (Increased). Negative for behavioral problems and sleep disturbance. The patient is not nervous/anxious.      Physical Exam      Initial Vitals [03/23/18 1555]   BP Pulse Resp Temp SpO2   (!) 157/80 106 18 98.9 °F (37.2  °C) 96 %      MAP       105.67          Physical Exam  Nursing Notes and Vital Signs Reviewed.  Constitutional: Patient is in no acute distress. Well-developed and well-nourished.  Head: Atraumatic. Normocephalic.  Eyes: PERRL. EOM intact. Conjunctivae are not pale. No scleral icterus.  ENT: Mucous membranes are moist. Oropharynx is clear and symmetric. Uvula midline.     Neck: Supple. Full ROM. No lymphadenopathy.  Cardiovascular: Irregularly irregular rhythm.  Pulmonary/Chest: No respiratory distress. Crackles noted bilaterally.   Abdominal: Soft and non-distended.  There is no tenderness.   Musculoskeletal: Moves all extremities. No obvious deformities. 1+ lower extremity edema. Tenderness noted to lower extremities below knee. Left leg is more swollen than right leg.   Skin: Warm and dry.  Neurological:  Alert, awake, and appropriate.  Normal speech.  No acute focal neurological deficits are appreciated. Cranial nerves intact.   Psychiatric: Normal affect. Good eye contact. Appropriate in content.    ED Course    Critical Care  Date/Time: 3/24/2018 12:54 AM  Performed by: SIENA FOX  Authorized by: SIENA FOX   Direct patient critical care time: 12 minutes  Additional history critical care time: 5 minutes  Ordering / reviewing critical care time: 4 minutes  Documentation critical care time: 5 minutes  Consulting other physicians critical care time: 4 minutes  Consult with family critical care time: 5 minutes  Total critical care time (exclusive of procedural time) : 35 minutes  Critical care was necessary to treat or prevent imminent or life-threatening deterioration of the following conditions: Management of Afib with RVR.  Critical care was time spent personally by me on the following activities: blood draw for specimens, development of treatment plan with patient or surrogate, discussions with consultants, interpretation of cardiac output measurements, evaluation of patient's response to  treatment, examination of patient, obtaining history from patient or surrogate, ordering and performing treatments and interventions, ordering and review of laboratory studies, ordering and review of radiographic studies, pulse oximetry, review of old charts and re-evaluation of patient's condition.        ED Vital Signs:  Vitals:    03/23/18 1555 03/23/18 2145 03/23/18 2202 03/23/18 2217   BP: (!) 157/80  (!) 143/70 (!) 169/73   Pulse: 106 110 (!) 112 103   Resp: 18 19 19   Temp: 98.9 °F (37.2 °C)      TempSrc: Oral      SpO2: 96%  (!) 94% 95%    03/24/18 0026 03/24/18 0029 03/24/18 0030 03/24/18 0032   BP:   (!) 161/78    Pulse: (!) 156 (!) 158 (!) 154 (!) 158   Resp: 20 (!) 22 (!) 22 (!) 26   Temp:       TempSrc:       SpO2: (!) 93% (!) 94% (!) 93% 96%       Abnormal Lab Results:  Labs Reviewed   COMPREHENSIVE METABOLIC PANEL - Abnormal; Notable for the following:        Result Value    CO2 22 (*)     Glucose 125 (*)     BUN, Bld 29 (*)     Creatinine 2.0 (*)     Albumin 2.8 (*)     ALT 7 (*)     eGFR if  27 (*)     eGFR if non  23 (*)     All other components within normal limits   CBC W/ AUTO DIFFERENTIAL - Abnormal; Notable for the following:     RBC 3.66 (*)     Hemoglobin 9.6 (*)     Hematocrit 31.1 (*)     MCH 26.2 (*)     MCHC 30.9 (*)     RDW 18.0 (*)     All other components within normal limits   TROPONIN I - Abnormal; Notable for the following:     Troponin I 0.040 (*)     All other components within normal limits   B-TYPE NATRIURETIC PEPTIDE - Abnormal; Notable for the following:      (*)     All other components within normal limits   PROTIME-INR   DRUG SCREEN PANEL, URINE EMERGENCY   URINALYSIS        All Lab Results:  Results for orders placed or performed during the hospital encounter of 03/23/18   Comprehensive metabolic panel   Result Value Ref Range    Sodium 138 136 - 145 mmol/L    Potassium 3.8 3.5 - 5.1 mmol/L    Chloride 108 95 - 110 mmol/L    CO2 22  (L) 23 - 29 mmol/L    Glucose 125 (H) 70 - 110 mg/dL    BUN, Bld 29 (H) 8 - 23 mg/dL    Creatinine 2.0 (H) 0.5 - 1.4 mg/dL    Calcium 9.3 8.7 - 10.5 mg/dL    Total Protein 7.5 6.0 - 8.4 g/dL    Albumin 2.8 (L) 3.5 - 5.2 g/dL    Total Bilirubin 0.2 0.1 - 1.0 mg/dL    Alkaline Phosphatase 109 55 - 135 U/L    AST 13 10 - 40 U/L    ALT 7 (L) 10 - 44 U/L    Anion Gap 8 8 - 16 mmol/L    eGFR if African American 27 (A) >60 mL/min/1.73 m^2    eGFR if non African American 23 (A) >60 mL/min/1.73 m^2   CBC auto differential   Result Value Ref Range    WBC 8.75 3.90 - 12.70 K/uL    RBC 3.66 (L) 4.00 - 5.40 M/uL    Hemoglobin 9.6 (L) 12.0 - 16.0 g/dL    Hematocrit 31.1 (L) 37.0 - 48.5 %    MCV 85 82 - 98 fL    MCH 26.2 (L) 27.0 - 31.0 pg    MCHC 30.9 (L) 32.0 - 36.0 g/dL    RDW 18.0 (H) 11.5 - 14.5 %    Platelets 347 150 - 350 K/uL    MPV 10.1 9.2 - 12.9 fL    Gran # (ANC) 5.4 1.8 - 7.7 K/uL    Lymph # 2.0 1.0 - 4.8 K/uL    Mono # 1.0 0.3 - 1.0 K/uL    Eos # 0.3 0.0 - 0.5 K/uL    Baso # 0.06 0.00 - 0.20 K/uL    Gran% 61.8 38.0 - 73.0 %    Lymph% 22.6 18.0 - 48.0 %    Mono% 11.2 4.0 - 15.0 %    Eosinophil% 3.7 0.0 - 8.0 %    Basophil% 0.7 0.0 - 1.9 %    Differential Method Automated    Troponin I   Result Value Ref Range    Troponin I 0.040 (H) 0.000 - 0.026 ng/mL   Brain natriuretic peptide   Result Value Ref Range     (H) 0 - 99 pg/mL   Protime-INR   Result Value Ref Range    Prothrombin Time 10.1 9.0 - 12.5 sec    INR 1.0 0.8 - 1.2         Imaging Results:  Imaging Results          CT Head Without Contrast (Final result)  Result time 03/23/18 23:16:45    Final result by Kevyn De La Cruz MD (03/23/18 23:16:45)                 Impression:           1.  Negative for acute intracranial process. Negative for hemorrhage, or skull fracture.  2.  Cerebral atrophy, intracranial calcifications and small vessel ischemic changes noted.    3.  Stable findings as noted above.    All CT scans at this facility used dose modulation,  iterative reconstruction, and/or weight based dosing when appropriate to reduce radiation dose to as low as reasonably achievable.      Electronically signed by: KEVYN DE LA CRUZ MD  Date:     03/23/18  Time:    23:16              Narrative:    Head CT scan without contrast    Clinical Indication: Confusion/delirium, altered LOC, unexplained .  Alteration of awareness    Findings:  Comparisons are made to 06/25/2016.   The ventricles are midline and the CSF spaces are prominent. The gray-white matter junction is well preserved. Negative for intracranial vascular abnormalities. Negative for mass, mass effect, cerebral edema, hemorrhage or abnormal fluid collections.  Intracranial calcifications and small vessel ischemic changes noted. There are falx calcifications.    The skull and scalp are intact.  Mild right maxillary sinus disease noted. The rest of the paranasal sinuses, mastoid air cells, middle ears and ear canals are clear. The globes are intact.  There are postoperative changes to the right globe.                             X-Ray Chest 1 View (Final result)  Result time 03/23/18 22:02:17    Final result by Kevyn De La Cruz MD (03/23/18 22:02:17)                 Impression:          1.  Moderate root interstitial changes in the lung bases with cardiac silhouette size enlarged.  Findings are concerning for interstitial infectious process or interstitial pulmonary edema.  Clinical correlation is advised.  2.  Incidental findings as noted above.  Negative for acute process otherwise.      Electronically signed by: KEVYN DE LA CRUZ MD  Date:     03/23/18  Time:    22:02              Narrative:    Portable Chest x-ray    Clinical Indication: Transient alteration of awareness.  Patient is disoriented    Findings:     No comparison studies are available.  Moderate reticular interstitial changes to the lung bases with possible vascular congestion.  The lungs are free of alveolar opacities. The cardiac silhouette size is  enlarged. The trachea is midline and the mediastinal width is normal. Negative for effusion or pneumothorax. Pulmonary vasculature is congested. Negative for osseous abnormalities. Convex left curvature of the thoracic spine. There are calcifications of the aortic knob and tortuosity of the descending thoracic aorta. There are degenerative changes of spine and both shoulder girdles. There are changes of a prior median sternotomy and CABG/prosthetic valve changes.  Postoperative changes in the right axilla.                             US Lower Extremity Veins Bilateral (Final result)  Result time 03/23/18 22:09:31    Final result by Kevyn De La Cruz MD (03/23/18 22:09:31)                 Impression:           1.  No evidence of right or left deep venous thrombosis.      Electronically signed by: KEVYN DE LA CRUZ MD  Date:     03/23/18  Time:    22:09              Narrative:    Bilateral lower extremity DVT ultrasound:    History: Pain and right leg.  Pain in left leg..  Lower extremity swelling.    Technique: Real-time, color, and duplex evaluation of the deep venous vessels in both lower extremities were performed.    Findings:   No comparison studies are available.  No evidence of deep venous thrombosis in either lower extremity. The deep venous vessels demonstrate normal spontaneous and augmented flow with normal respiratory variation.  Deep venous vessels compress in a normal fashion throughout.                                  The EKG was ordered, reviewed, and independently interpreted by the ED provider.  Interpretation time: 21:45  Rate: 109 BPM  Rhythm: Afib with RVR  Interpretation: Normal ST segments. No T wave inversions. . Right bundle branch block . No STEMI.    The EKG was ordered, reviewed, and independently interpreted by the ED provider.  Interpretation time: 0:31  Rate: 157 BPM  Rhythm: Afib with RVR  Interpretation: QRS normal. ST segments normal. T waves matt. Axis normal. Right bindle branch  block. No STEMI.    The Emergency Provider reviewed the vital signs and test results, which are outlined above.    ED Discussion     12:25 AM: Dr. Doty discussed the pt's case with Dr. Willoughby ([facility]) who recommends admitting pt to .    12:28 AM: Re-evaluated pt. Pt is in Afib with RVR. [facility] is in the room and 10 of Cardizem will be administered.  D/w pt all pertinent results. D/w pt any concerns expressed at this time. Answered all questions. Pt expresses understanding at this time.    12:32 AM: Discussed case with Dr. Willoughby ([facility]). Dr. Willoughby agrees with current care and management of pt and accepts admission.   Admitting Service: [facility]   Admitting Physician: Dr. Willoughby  Admit to: Tele Obs    12:32 AM: Re-evaluated pt. I have discussed test results, shared treatment plan, and the need for admission with patient and family at bedside. Pt and family express understanding at this time and agree with all information. All questions answered. Pt and family have no further questions or concerns at this time. Pt is ready for admit.      ED Medication(s):  Medications   diltiaZEM injection 10 mg (not administered)       New Prescriptions    No medications on file             Medical Decision Making    Medical Decision Making:   Clinical Tests:   Lab Tests: Ordered and Reviewed  Radiological Study: Ordered and Reviewed  Medical Tests: Ordered and Reviewed           Scribe Attestation:   Scribe #1: I performed the above scribed service and the documentation accurately describes the services I performed. I attest to the accuracy of the note.    Attending:   Physician Attestation Statement for Scribe #1: I, Paolo Doty MD, personally performed the services described in this documentation, as scribed by Henry Pineda, in my presence, and it is both accurate and complete.          Clinical Impression       ICD-10-CM ICD-9-CM   1. Atrial fibrillation with rapid  ventricular response I48.91 427.31   2. Altered mental status R41.82 780.97   3. Confusion R41.0 298.9   4. Lower extremity pain M79.606 729.5   5. Tachycardia R00.0 785.0   6. Elevated troponin R74.8 790.6       Disposition:   Disposition: Placed in Observation  Condition: Stable         Paolo Doyt MD  03/24/18 0111

## 2018-03-24 NOTE — H&P
[facility] Hospital Medicine  History & Physical    Patient Name: Anu Blanco  MRN: [id number]  Admission Date: 3/23/2018  Attending Physician: Moody Willoughby MD  Primary Care Provider: Serena Chu MD         Patient information was obtained from patient, past medical records and ER records.     Subjective:     Principal Problem:Atrial fibrillation, new onset    Chief Complaint:   Chief Complaint   Patient presents with    Leg Pain     bilateral leg pain, progressed from feet to thighs over the past few days        HPI: Anu Blanco is a 78 y.o. female patient  presents for compliant of bilateral leg pain/swelling. Onset few days ago and continues to worsen. Reportedly swelling to her left leg normal, but her right leg has been swelling which has made if difficulty for pt to walk per daughter at bedside. Pt's family deny a hx of DVT or PE. Pt was also reportedly tested for dementia, reports negative per daughter. Walking exacerbates. Associated symptoms: worsening confusion. The patient was evaluated in the Er. Patient presented to Er in [facility] with no history. Over ER course ventricular rate worsened (max of 160s), improved and rate better controlled 100-110s post cardizem push given in Er. Hospital medicine was consulted for admission. CBC, CMP stable with baseline reviewed in care everywhere. No known baseline for troponin, today 0.040, . TSH normal. Mg/Phos neg. CT head impression: NAF. Chest Xray impression: Moderate root interstitial changes in the lung bases with cardiac silhouette size enlarged. Findings are concerning for interstitial infectious process or interstitial pulmonary edema.  Clinical correlation is advised. Patient afebrile, no wbc, no cough. Patient placed in obs for new onset afib, acute pulmonary edema.       Past Medical History:   Diagnosis Date    Anemia     CAD (coronary artery disease)     Chronic back pain     Fibromyalgia     H/O aortic valve  replacement 2010    History of breast cancer 1994    Right Lumpectomy    Hyperlipemia     Hypothyroid     Osteoarthritis     PVD (peripheral vascular disease)     Renal failure     Rheumatoid arteritis     S/P CABG (coronary artery bypass graft) 91,10    Syncope and collapse 07/2016    Vitamin D deficiency        Past Surgical History:   Procedure Laterality Date    ANKLE FRACTURE SURGERY      right foot    AORTIC VALVE REPLACEMENT  2010    APPENDECTOMY      APPENDECTOMY      BREAST LUMPECTOMY Right 1994    CORONARY ARTERY BYPASS GRAFT      HYSTERECTOMY      TONSILLECTOMY         Review of patient's allergies indicates:   Allergen Reactions    Codeine      Does not remember side effects    Corticosteroids (glucocorticoids)     Diovan hct [valsartan-hydrochlorothiazide]      May drowsy    Hydrocodone      Itching, nausea and vomiting.    Indomethacin     Iodine and iodide containing products      Rash and swelling    Lortab [hydrocodone-acetaminophen]     Omnicef [cefdinir]      Rash/itching    Oxycodone      Itching    Penicillins      Vaginal yeast infection       No current facility-administered medications on file prior to encounter.      Current Outpatient Prescriptions on File Prior to Encounter   Medication Sig    ACETAMINOPHEN (TYLENOL ARTHRITIS ORAL) Take by mouth once daily.    amLODIPine (NORVASC) 5 MG tablet Take 5 mg by mouth once daily.    atorvastatin (LIPITOR) 40 MG tablet TAKE ONE TABLET BY MOUTH ONCE DAILY    calcitRIOL (ROCALTROL) 0.25 MCG Cap Take 1 capsule by mouth once daily.    carvedilol (COREG) 25 MG tablet Take 1 tablet by mouth once daily.    ERGOCALCIFEROL, VITAMIN D2, (VITAMIN D2 ORAL) Take by mouth once daily.    hydrALAZINE (APRESOLINE) 25 MG tablet 25 mg 2 (two) times daily.     levothyroxine (SYNTHROID) 75 MCG tablet Take 1 tablet (75 mcg total) by mouth once daily.    zolpidem (AMBIEN) 10 mg Tab TAKE ONE TABLET BY MOUTH AT BEDTIME AS NEEDED      Family History     Reviewed and not Pertinent           Social History Main Topics    Smoking status: Never Smoker    Smokeless tobacco: Never Used    Alcohol use No    Drug use: No    Sexual activity: Not Currently     Review of Systems   Unable to perform ROS: Mental status change     Objective:     Vital Signs (Most Recent):  Temp: 98.9 °F (37.2 °C) (03/23/18 1555)  Pulse: (!) 156 (03/24/18 0042)  Resp: (!) 22 (03/24/18 0042)  BP: (!) 161/78 (03/24/18 0030)  SpO2: 98 % (03/24/18 0042) Vital Signs (24h Range):  Temp:  [98.9 °F (37.2 °C)] 98.9 °F (37.2 °C)  Pulse:  [103-158] 156  Resp:  [18-26] 22  SpO2:  [93 %-98 %] 98 %  BP: (143-169)/(70-80) 161/78        There is no height or weight on file to calculate BMI.    Physical Exam   Constitutional: She appears well-developed. No distress.   Elderly, pleasant     HENT:   Head: Normocephalic and atraumatic.   Nose: Nose normal.   Eyes: Conjunctivae and EOM are normal. Pupils are equal, round, and reactive to light. No scleral icterus.   Neck: Normal range of motion. Neck supple. No tracheal deviation present.   Cardiovascular: Normal heart sounds and intact distal pulses.    No murmur heard.  Tachycardia  +2 bilateral lower ext edema     Pulmonary/Chest: Effort normal. No stridor. No respiratory distress. She has no wheezes. She has no rales.   Course throughout     Abdominal: Soft. Bowel sounds are normal. She exhibits no distension. There is no tenderness. There is no guarding.   Musculoskeletal: Normal range of motion. She exhibits no edema or deformity.   Neurological: She is alert. No cranial nerve deficit.   Oriented to person and situation. Confused at times  Follows commands     Skin: Skin is warm and dry. Capillary refill takes less than 2 seconds. No rash noted. She is not diaphoretic.   Psychiatric: She has a normal mood and affect. Her behavior is normal.   No irritation,no agitation.    Nursing note and vitals reviewed.        CRANIAL NERVES      CN III, IV, VI   Pupils are equal, round, and reactive to light.  Extraocular motions are normal.        Significant Labs: All pertinent labs within the past 24 hours have been reviewed.  Results for orders placed or performed during the hospital encounter of 03/23/18   Comprehensive metabolic panel   Result Value Ref Range    Sodium 138 136 - 145 mmol/L    Potassium 3.8 3.5 - 5.1 mmol/L    Chloride 108 95 - 110 mmol/L    CO2 22 (L) 23 - 29 mmol/L    Glucose 125 (H) 70 - 110 mg/dL    BUN, Bld 29 (H) 8 - 23 mg/dL    Creatinine 2.0 (H) 0.5 - 1.4 mg/dL    Calcium 9.3 8.7 - 10.5 mg/dL    Total Protein 7.5 6.0 - 8.4 g/dL    Albumin 2.8 (L) 3.5 - 5.2 g/dL    Total Bilirubin 0.2 0.1 - 1.0 mg/dL    Alkaline Phosphatase 109 55 - 135 U/L    AST 13 10 - 40 U/L    ALT 7 (L) 10 - 44 U/L    Anion Gap 8 8 - 16 mmol/L    eGFR if African American 27 (A) >60 mL/min/1.73 m^2    eGFR if non African American 23 (A) >60 mL/min/1.73 m^2   CBC auto differential   Result Value Ref Range    WBC 8.75 3.90 - 12.70 K/uL    RBC 3.66 (L) 4.00 - 5.40 M/uL    Hemoglobin 9.6 (L) 12.0 - 16.0 g/dL    Hematocrit 31.1 (L) 37.0 - 48.5 %    MCV 85 82 - 98 fL    MCH 26.2 (L) 27.0 - 31.0 pg    MCHC 30.9 (L) 32.0 - 36.0 g/dL    RDW 18.0 (H) 11.5 - 14.5 %    Platelets 347 150 - 350 K/uL    MPV 10.1 9.2 - 12.9 fL    Gran # (ANC) 5.4 1.8 - 7.7 K/uL    Lymph # 2.0 1.0 - 4.8 K/uL    Mono # 1.0 0.3 - 1.0 K/uL    Eos # 0.3 0.0 - 0.5 K/uL    Baso # 0.06 0.00 - 0.20 K/uL    Gran% 61.8 38.0 - 73.0 %    Lymph% 22.6 18.0 - 48.0 %    Mono% 11.2 4.0 - 15.0 %    Eosinophil% 3.7 0.0 - 8.0 %    Basophil% 0.7 0.0 - 1.9 %    Differential Method Automated    Troponin I   Result Value Ref Range    Troponin I 0.040 (H) 0.000 - 0.026 ng/mL   Brain natriuretic peptide   Result Value Ref Range     (H) 0 - 99 pg/mL   Protime-INR   Result Value Ref Range    Prothrombin Time 10.1 9.0 - 12.5 sec    INR 1.0 0.8 - 1.2       Significant Imaging: I have reviewed all pertinent  imaging results/findings within the past 24 hours.   Imaging Results          CT Head Without Contrast (Final result)  Result time 03/23/18 23:16:45    Final result by Kevyn De La Cruz MD (03/23/18 23:16:45)                 Impression:           1.  Negative for acute intracranial process. Negative for hemorrhage, or skull fracture.  2.  Cerebral atrophy, intracranial calcifications and small vessel ischemic changes noted.    3.  Stable findings as noted above.    All CT scans at this facility used dose modulation, iterative reconstruction, and/or weight based dosing when appropriate to reduce radiation dose to as low as reasonably achievable.      Electronically signed by: KEVYN DE LA CRUZ MD  Date:     03/23/18  Time:    23:16              Narrative:    Head CT scan without contrast    Clinical Indication: Confusion/delirium, altered LOC, unexplained .  Alteration of awareness    Findings:  Comparisons are made to 06/25/2016.   The ventricles are midline and the CSF spaces are prominent. The gray-white matter junction is well preserved. Negative for intracranial vascular abnormalities. Negative for mass, mass effect, cerebral edema, hemorrhage or abnormal fluid collections.  Intracranial calcifications and small vessel ischemic changes noted. There are falx calcifications.    The skull and scalp are intact.  Mild right maxillary sinus disease noted. The rest of the paranasal sinuses, mastoid air cells, middle ears and ear canals are clear. The globes are intact.  There are postoperative changes to the right globe.                             X-Ray Chest 1 View (Final result)  Result time 03/23/18 22:02:17    Final result by Kevyn De La Cruz MD (03/23/18 22:02:17)                 Impression:          1.  Moderate root interstitial changes in the lung bases with cardiac silhouette size enlarged.  Findings are concerning for interstitial infectious process or interstitial pulmonary edema.  Clinical correlation is  advised.  2.  Incidental findings as noted above.  Negative for acute process otherwise.      Electronically signed by: KEVYN DE LA CRUZ MD  Date:     03/23/18  Time:    22:02              Narrative:    Portable Chest x-ray    Clinical Indication: Transient alteration of awareness.  Patient is disoriented    Findings:     No comparison studies are available.  Moderate reticular interstitial changes to the lung bases with possible vascular congestion.  The lungs are free of alveolar opacities. The cardiac silhouette size is enlarged. The trachea is midline and the mediastinal width is normal. Negative for effusion or pneumothorax. Pulmonary vasculature is congested. Negative for osseous abnormalities. Convex left curvature of the thoracic spine. There are calcifications of the aortic knob and tortuosity of the descending thoracic aorta. There are degenerative changes of spine and both shoulder girdles. There are changes of a prior median sternotomy and CABG/prosthetic valve changes.  Postoperative changes in the right axilla.                             US Lower Extremity Veins Bilateral (Final result)  Result time 03/23/18 22:09:31    Final result by Kevyn De La Cruz MD (03/23/18 22:09:31)                 Impression:           1.  No evidence of right or left deep venous thrombosis.      Electronically signed by: KEVYN DE LA CRUZ MD  Date:     03/23/18  Time:    22:09              Narrative:    Bilateral lower extremity DVT ultrasound:    History: Pain and right leg.  Pain in left leg..  Lower extremity swelling.    Technique: Real-time, color, and duplex evaluation of the deep venous vessels in both lower extremities were performed.    Findings:   No comparison studies are available.  No evidence of deep venous thrombosis in either lower extremity. The deep venous vessels demonstrate normal spontaneous and augmented flow with normal respiratory variation.  Deep venous vessels compress in a normal fashion throughout.                               I have personally reviewed the patients labs, imaging, ekg and discussed the patient case in detail with the Er provider    Assessment/Plan:     * Atrial fibrillation, new onset    O2  Cardizem IV Push and assess reponse, drip if need   now  moniotor troponin.   electrolytes, tsh: neg  Check procal, lactic, esr, crp, urine.   Cardiology consult         Acute pulmonary edema    Correct new onset a fib  Assess respone to lasix x1   Check Echo        Essential hypertension    Continue home meds  Monitor trends        Hypothyroid    cehck tsh  Continue home med          CAD (coronary artery disease)    Continue statin, asa, bb  Cardiology consult  Check lipid panel, a1c          VTE Risk Mitigation         Ordered     heparin (porcine) injection 5,000 Units  Every 8 hours     Route:  Subcutaneous        03/24/18 0055     IP VTE HIGH RISK PATIENT  Once     teds 03/24/18 0036             Douglas Kapoor NP  Department of Hospital Medicine   [facility]

## 2018-03-24 NOTE — ASSESSMENT & PLAN NOTE
Reviewed patient urine results. procal +, Crp+. Paitent with +WBC and Leukocyte. Rocephin started. Urine culture added. Consider diflucan post antx as patient complains of vaginal yeast infection with antx treatments.

## 2018-03-24 NOTE — PLAN OF CARE
Problem: Patient Care Overview  Goal: Plan of Care Review  Patient resting comfortably in bed. No complaints throughout the night. Patient is pleasantly confused. PIV intact. A-fib with controlled rate on tele monitor. Fall precautions are in place. Patient was educated on calling for assistance. Oriented to her room. Plan of care discussed with patient. All questions answered. ER nurse Hermila stated she would call patient's daughter Isis to inform her of her admission to the 2nd floor. Will continue to monitor.

## 2018-03-25 LAB
ANION GAP SERPL CALC-SCNC: 10 MMOL/L
BASOPHILS # BLD AUTO: 0.09 K/UL
BASOPHILS NFR BLD: 0.9 %
BUN SERPL-MCNC: 37 MG/DL
CALCIUM SERPL-MCNC: 9.2 MG/DL
CHLORIDE SERPL-SCNC: 106 MMOL/L
CO2 SERPL-SCNC: 23 MMOL/L
CREAT SERPL-MCNC: 2.5 MG/DL
DIFFERENTIAL METHOD: ABNORMAL
EOSINOPHIL # BLD AUTO: 0.9 K/UL
EOSINOPHIL NFR BLD: 8.9 %
ERYTHROCYTE [DISTWIDTH] IN BLOOD BY AUTOMATED COUNT: 18.3 %
EST. GFR  (AFRICAN AMERICAN): 20 ML/MIN/1.73 M^2
EST. GFR  (NON AFRICAN AMERICAN): 18 ML/MIN/1.73 M^2
GLUCOSE SERPL-MCNC: 106 MG/DL
HCT VFR BLD AUTO: 29.7 %
HGB BLD-MCNC: 9.1 G/DL
LYMPHOCYTES # BLD AUTO: 2.3 K/UL
LYMPHOCYTES NFR BLD: 22.4 %
MCH RBC QN AUTO: 26 PG
MCHC RBC AUTO-ENTMCNC: 30.6 G/DL
MCV RBC AUTO: 85 FL
MONOCYTES # BLD AUTO: 1.5 K/UL
MONOCYTES NFR BLD: 14.1 %
NEUTROPHILS # BLD AUTO: 5.6 K/UL
NEUTROPHILS NFR BLD: 53.9 %
PLATELET # BLD AUTO: 354 K/UL
PMV BLD AUTO: 10.2 FL
POTASSIUM SERPL-SCNC: 4.4 MMOL/L
RBC # BLD AUTO: 3.5 M/UL
SODIUM SERPL-SCNC: 139 MMOL/L
WBC # BLD AUTO: 10.35 K/UL

## 2018-03-25 PROCEDURE — 97530 THERAPEUTIC ACTIVITIES: CPT

## 2018-03-25 PROCEDURE — 25000003 PHARM REV CODE 250: Performed by: NURSE PRACTITIONER

## 2018-03-25 PROCEDURE — 25000242 PHARM REV CODE 250 ALT 637 W/ HCPCS: Performed by: NURSE PRACTITIONER

## 2018-03-25 PROCEDURE — 94761 N-INVAS EAR/PLS OXIMETRY MLT: CPT

## 2018-03-25 PROCEDURE — 21400001 HC TELEMETRY ROOM

## 2018-03-25 PROCEDURE — 36415 COLL VENOUS BLD VENIPUNCTURE: CPT

## 2018-03-25 PROCEDURE — 85025 COMPLETE CBC W/AUTO DIFF WBC: CPT

## 2018-03-25 PROCEDURE — 97162 PT EVAL MOD COMPLEX 30 MIN: CPT

## 2018-03-25 PROCEDURE — G8978 MOBILITY CURRENT STATUS: HCPCS | Mod: CJ

## 2018-03-25 PROCEDURE — 63600175 PHARM REV CODE 636 W HCPCS: Performed by: NURSE PRACTITIONER

## 2018-03-25 PROCEDURE — 27000221 HC OXYGEN, UP TO 24 HOURS

## 2018-03-25 PROCEDURE — A4216 STERILE WATER/SALINE, 10 ML: HCPCS | Performed by: NURSE PRACTITIONER

## 2018-03-25 PROCEDURE — 94640 AIRWAY INHALATION TREATMENT: CPT

## 2018-03-25 PROCEDURE — 99233 SBSQ HOSP IP/OBS HIGH 50: CPT | Mod: ,,, | Performed by: INTERNAL MEDICINE

## 2018-03-25 PROCEDURE — 80048 BASIC METABOLIC PNL TOTAL CA: CPT

## 2018-03-25 PROCEDURE — G8979 MOBILITY GOAL STATUS: HCPCS | Mod: CK

## 2018-03-25 RX ORDER — FUROSEMIDE 10 MG/ML
40 INJECTION INTRAMUSCULAR; INTRAVENOUS ONCE
Status: COMPLETED | OUTPATIENT
Start: 2018-03-25 | End: 2018-03-25

## 2018-03-25 RX ORDER — POLYETHYLENE GLYCOL 3350 17 G/17G
17 POWDER, FOR SOLUTION ORAL DAILY
Status: DISCONTINUED | OUTPATIENT
Start: 2018-03-25 | End: 2018-03-27 | Stop reason: HOSPADM

## 2018-03-25 RX ADMIN — HYDRALAZINE HYDROCHLORIDE 25 MG: 25 TABLET, FILM COATED ORAL at 09:03

## 2018-03-25 RX ADMIN — ATORVASTATIN CALCIUM 40 MG: 40 TABLET, FILM COATED ORAL at 08:03

## 2018-03-25 RX ADMIN — LEVOTHYROXINE SODIUM 75 MCG: 75 TABLET ORAL at 05:03

## 2018-03-25 RX ADMIN — CEFTRIAXONE 1 G: 1 INJECTION, SOLUTION INTRAVENOUS at 09:03

## 2018-03-25 RX ADMIN — CALCITRIOL 0.25 MCG: 0.25 CAPSULE, LIQUID FILLED ORAL at 08:03

## 2018-03-25 RX ADMIN — POLYETHYLENE GLYCOL 3350 17 G: 17 POWDER, FOR SOLUTION ORAL at 03:03

## 2018-03-25 RX ADMIN — FUROSEMIDE 40 MG: 10 INJECTION, SOLUTION INTRAMUSCULAR; INTRAVENOUS at 03:03

## 2018-03-25 RX ADMIN — CARVEDILOL 25 MG: 12.5 TABLET, FILM COATED ORAL at 08:03

## 2018-03-25 RX ADMIN — SODIUM CHLORIDE, PRESERVATIVE FREE 3 ML: 5 INJECTION INTRAVENOUS at 03:03

## 2018-03-25 RX ADMIN — IPRATROPIUM BROMIDE 0.5 MG: 0.5 SOLUTION RESPIRATORY (INHALATION) at 04:03

## 2018-03-25 RX ADMIN — APIXABAN 2.5 MG: 2.5 TABLET, FILM COATED ORAL at 09:03

## 2018-03-25 RX ADMIN — SODIUM CHLORIDE, PRESERVATIVE FREE 3 ML: 5 INJECTION INTRAVENOUS at 09:03

## 2018-03-25 RX ADMIN — SODIUM CHLORIDE, PRESERVATIVE FREE 3 ML: 5 INJECTION INTRAVENOUS at 06:03

## 2018-03-25 RX ADMIN — HYDRALAZINE HYDROCHLORIDE 25 MG: 25 TABLET, FILM COATED ORAL at 08:03

## 2018-03-25 RX ADMIN — AMLODIPINE BESYLATE 5 MG: 5 TABLET ORAL at 08:03

## 2018-03-25 RX ADMIN — IPRATROPIUM BROMIDE 0.5 MG: 0.5 SOLUTION RESPIRATORY (INHALATION) at 07:03

## 2018-03-25 RX ADMIN — HEPARIN SODIUM 5000 UNITS: 5000 INJECTION, SOLUTION INTRAVENOUS; SUBCUTANEOUS at 06:03

## 2018-03-25 NOTE — UM SECONDARY REVIEW
Physician Advisor External    Level of Care Issue    Approved Inpatient for admit 3/23/2018 per Dr. Noe at EHR

## 2018-03-25 NOTE — PT/OT/SLP EVAL
Physical Therapy Evaluation    Patient Name:  Anu Blanco   MRN:  [id number]    Recommendations:     Discharge Recommendations:  nursing facility, skilled   Discharge Equipment Recommendations: walker, rolling (if she does not own one)   Barriers to discharge: inc burden on caregivers    Assessment:     Anu Blanco is a 78 y.o. female admitted with a medical diagnosis of Atrial fibrillation, new onset.  She presents with the following impairments/functional limitations:  impaired balance, impaired endurance, pain, impaired self care skills, weakness, gait instability, decreased coordination, impaired functional mobilty, decreased safety awareness, impaired cognition.    Rehab Prognosis:  good; patient would benefit from acute skilled PT services to address these deficits and reach maximum level of function.      Recent Surgery: * No surgery found *      Plan:     During this hospitalization, patient to be seen 5 x/week to address the above listed problems via gait training, therapeutic activities, therapeutic exercises  · Plan of Care Expires:      Plan of Care Reviewed with: patient    Subjective     Communicated with nurse prior to session.  Patient found lying supine in bed upon PT entry to room, agreeable to evaluation.      Chief Complaint: being tired  Patient comments/goals: to get stronger and go home/get back to walking  Pain/Comfort:  · Pain Rating 1: 3/10  · Location - Side 1: Right  · Location 1: ankle  · Pain Addressed 1: Distraction, Cessation of Activity    Patients cultural, spiritual, Orthodoxy conflicts given the current situation:      Living Environment:  Patient lonnie with dtr and son has been staying in town to help take care of his mom as well  Prior to admission, patients level of function was she needs assist with all mobility since an ankle fx 4 months ago.  Patient has the following equipment: rollator, bath bench, bedside commode.  DME owned (not currently used): unsure.  Upon  discharge, patient will have assistance from family - may be going to NH if children are unable to care for her.    Objective:     Patient found with: oxygen     General Precautions: Standard, fall   Orthopedic Precautions:N/A   Braces: N/A     Exams:  · B LE rom wfl and strength 3+/5 except R ankle dec rom d/t pain and strength 2-/5    Functional Mobility:  · Bed mobility - min/mod A with bed rail  · Transfers - min/mod A with RW  · Gt - several side steps with RW and min/mod A    AM-PAC 6 CLICK MOBILITY  Total Score:14       Therapeutic Activities and Exercises:   PT educated patient on POC, D/C recs, safe use of RW with mobility, up with assist only for fall prevention and le rom exs to do for dec stiffness and to prep mobility.    Patient left HOB elevated with all lines intact, call button in reach and nurse notified.    GOALS:    Physical Therapy Goals        Problem: Physical Therapy Goal    Goal Priority Disciplines Outcome Goal Variances Interventions   Physical Therapy Goal     PT/OT, PT      Description:  LTG's for PT by 4/1/18  1. Patient will perform supine to/from sit sba  2. Patient will perform sit to stand with RW and cga or less  3. Patient will amb x 25ft RW cga for bathroom distances at home                    History:     Past Medical History:   Diagnosis Date    Anemia     CAD (coronary artery disease)     Chronic back pain     Fibromyalgia     H/O aortic valve replacement 2010    History of breast cancer 1994    Right Lumpectomy    Hyperlipemia     Hypothyroid     Osteoarthritis     PVD (peripheral vascular disease)     Renal failure     Rheumatoid arteritis     S/P CABG (coronary artery bypass graft) 91,10    Syncope and collapse 07/2016    Vitamin D deficiency        Past Surgical History:   Procedure Laterality Date    ANKLE FRACTURE SURGERY      right foot    AORTIC VALVE REPLACEMENT  2010    APPENDECTOMY      APPENDECTOMY      BREAST LUMPECTOMY Right 1994    CORONARY  ARTERY BYPASS GRAFT      HYSTERECTOMY      TONSILLECTOMY         Clinical Decision Making:     History  Co-morbidities and personal factors that may impact the plan of care Examination  Body Structures and Functions, activity limitations and participation restrictions that may impact the plan of care Clinical Presentation   Decision Making/ Complexity Score   Co-morbidities:   [] Time since onset of injury / illness / exacerbation  [] Status of current condition  []Patient's cognitive status and safety concerns    [] Multiple Medical Problems (see med hx)  Personal Factors:   [] Patient's age  [] Prior Level of function   [] Patient's home situation (environment and family support)  [] Patient's level of motivation  [] Expected progression of patient      HISTORY:(criteria)    [] 43075 - no personal factors/history    [] 69308 - has 1-2 personal factor/comorbidity     [] 67317 - has >3 personal factor/comorbidity     Body Regions:  [] Objective examination findings  [] Head     []  Neck  [] Trunk   [] Upper Extremity  [] Lower Extremity    Body Systems:  [] For communication ability, affect, cognition, language, and learning style: the assessment of the ability to make needs known, consciousness, orientation (person, place, and time), expected emotional /behavioral responses, and learning preferences (eg, learning barriers, education  needs)  [] For the neuromuscular system: a general assessment of gross coordinated movement (eg, balance, gait, locomotion, transfers, and transitions) and motor function  (motor control and motor learning)  [] For the musculoskeletal system: the assessment of gross symmetry, gross range of motion, gross strength, height, and weight  [] For the integumentary system: the assessment of pliability(texture), presence of scar formation, skin color, and skin integrity  [] For cardiovascular/pulmonary system: the assessment of heart rate, respiratory rate, blood pressure, and edema      Activity limitations:    [] Patient's cognitive status and saf ety concerns          [] Status of current condition      [] Weight bearing restriction  [] Cardiopulmunary Restriction    Participation Restrictions:   [] Goals and goal agreement with the patient     [] Rehab potential (prognosis) and probable outcome      Examination of Body System: (criteria)    [] 93594 - addressing 1-2 elements    [] 33634 - addressing a total of 3 or more elements     [] 33827 -  Addressing a total of 4 or more elements         Clinical Presentation: (criteria)  Choose one     On examination of body system using standardized tests and measures patient presents with (CHOOSE ONE) elements from any of the following: body structures and functions, activity limitations, and/or participation restrictions.  Leading to a clinical presentation that is considered (CHOOSE ONE)                              Clinical Decision Making  (Eval Complexity):  Choose One     Time Tracking:     PT Received On: 03/24/18  PT Start Time: 1300     PT Stop Time: 1325  PT Total Time (min): 25 min     Billable Minutes: Evaluation 15 and Therapeutic Activity 10      Erwin Juarez, PT  03/25/2018

## 2018-03-25 NOTE — HOSPITAL COURSE
Anu Blanco is a 78 year old female admitted for new onset A-fib, and bilateral leg swelling. She has PMHx of CAD, s/p CABG/AVR, HLD, hypothyroidism, PVD, syncope and anemia. Pt currently in SR s/p cardizem 10 mg IVP x 1 in ED. A-fib may be secondary to UTI and/or pulmonary edema. Cardiology consulted -- high risk for CVA with elevated CHADVAS score, cardiology recommend anticoagulation with Eliqis 2.5 mg BID and continue ASA 81 mg daily. Also recommend BB and Statin. She receivied IV Rocephin for UTI x 4 days. Multiple organisms isolated, none in predominance. Denies any dysuria or urgency. Pt oriented to person and place, however does have dementia. Denies chest pain, shortness of breath, palpitations, syncope or dizziness. Creatinine baseline around 1.9-2.0, however up to 2.9 on yesterday. Lasix IV discontinued.  Nephrology consult, recommendation noted. Creatinine down to 2.4 today. Patient will follow up with her cardiologist Dr Sagastume after discharge and [facility] as well.  She will be discharged to [facility]. Patient was seen and examined, she is ready for discharged.

## 2018-03-25 NOTE — PROGRESS NOTES
Primary nurse notified HM of RLE pain. Examined patient and she reports right ankle pain. Non tenderness upon palpation but pt has mild swelling to right ankle. Full ROM. Pedal pulses 2+. Pt reports having ankle surgery last year but unable to remember if it was the right or left ankle. Per PCP note on 03/20/18 -- daughter reported swelling to right lower extremity especially foot. She had an earlier ankle fracture with surgery back in November. US BLE on 03/23/18 negative for DVT. Will obtain right ankle xray to evaluate. Further recommendations to follow.

## 2018-03-25 NOTE — ASSESSMENT & PLAN NOTE
Likely secondary to demand ischemia in setting of rapid AF  Cont asa, statin, bb  2D ECHO with WMA consistent with prior CABG/valve repair  Rec oupt stress test or follow up with cardiologist

## 2018-03-25 NOTE — PROGRESS NOTES
[facility] -   Cardiology  Progress Note    Patient Name: Anu Blanco  MRN: [id number]  Admission Date: 3/23/2018  Hospital Length of Stay: 1 days  Code Status: Full Code   Attending Physician: Albert Willard MD   Primary Care Physician: Serena Chu MD  Expected Discharge Date:   Principal Problem:Atrial fibrillation, new onset    Subjective:   HPI:  This is an 78 year old female pt with New onset AFIB, PMHx CAD s/p CABG/AVR, HLD, hypothyroidism, PVD, syncope, anemia presents for compliant of bilateral leg pain/swelling. Information obtained mostly from chart review. PT had swelling onset few days ago and continues to worsen. Reportedly swelling to her left leg normal, but her right leg has been swelling which has made if difficulty for pt to walk per daughter at bedside. Pt's family deny a hx of DVT or PE. Pt was also reportedly tested for dementia, reports negative per daughter. Walking exacerbates. Associated symptoms: worsening confusion. The patient was evaluated in the Er. Patient presented to Er in [facility] with no history. Over ER course ventricular rate worsened (max of 160s), improved and rate better controlled 100-110s post cardizem push given in Er. Hospital medicine was consulted for admission. CBC, CMP stable with baseline reviewed in care everywhere. No known baseline for troponin, today 0.040, . TSH normal. Mg/Phos neg. CT head impression: NAF. Chest Xray impression: Moderate root interstitial changes in the lung bases with cardiac silhouette size enlarged. Findings are concerning for interstitial infectious process or interstitial pulmonary edema. Patient afebrile, no wbc, no cough. Patient placed in obs for new onset afib, acute pulmonary edema.     Pt converted to NSR this AM, is drowsy but arousable, denies CP/SOB/palpiations. PT received IV lasix feels symptomatically better.   Hospital Course:   3/24/18 - Pt converted to NSR, IV diuretics started. Denies CP/shortness of  breath/palpitations.   3/25/18 - Pt seated in chair, comfortable, denies CP/SOB/palpitations, remains in NSR.     Past Medical History:   Diagnosis Date    Anemia     CAD (coronary artery disease)     Chronic back pain     Fibromyalgia     H/O aortic valve replacement 2010    History of breast cancer 1994    Right Lumpectomy    Hyperlipemia     Hypothyroid     Osteoarthritis     PVD (peripheral vascular disease)     Renal failure     Rheumatoid arteritis     S/P CABG (coronary artery bypass graft) 91,10    Syncope and collapse 07/2016    Vitamin D deficiency        Past Surgical History:   Procedure Laterality Date    ANKLE FRACTURE SURGERY      right foot    AORTIC VALVE REPLACEMENT  2010    APPENDECTOMY      APPENDECTOMY      BREAST LUMPECTOMY Right 1994    CORONARY ARTERY BYPASS GRAFT      HYSTERECTOMY      TONSILLECTOMY         Review of patient's allergies indicates:   Allergen Reactions    Codeine      Does not remember side effects    Corticosteroids (glucocorticoids)     Diovan hct [valsartan-hydrochlorothiazide]      May drowsy    Hydrocodone      Itching, nausea and vomiting.    Indomethacin     Iodine and iodide containing products      Rash and swelling    Lortab [hydrocodone-acetaminophen]     Omnicef [cefdinir]      Rash/itching    Oxycodone      Itching    Penicillins      Vaginal yeast infection       No current facility-administered medications on file prior to encounter.      Current Outpatient Prescriptions on File Prior to Encounter   Medication Sig    ACETAMINOPHEN (TYLENOL ARTHRITIS ORAL) Take by mouth once daily.    amLODIPine (NORVASC) 5 MG tablet Take 5 mg by mouth once daily.    atorvastatin (LIPITOR) 40 MG tablet TAKE ONE TABLET BY MOUTH ONCE DAILY    calcitRIOL (ROCALTROL) 0.25 MCG Cap Take 1 capsule by mouth once daily.    carvedilol (COREG) 25 MG tablet Take 1 tablet by mouth once daily.    ERGOCALCIFEROL, VITAMIN D2, (VITAMIN D2 ORAL) Take by  mouth once daily.    hydrALAZINE (APRESOLINE) 25 MG tablet 25 mg 2 (two) times daily.     levothyroxine (SYNTHROID) 75 MCG tablet Take 1 tablet (75 mcg total) by mouth once daily.    zolpidem (AMBIEN) 10 mg Tab TAKE ONE TABLET BY MOUTH AT BEDTIME AS NEEDED     Family History     None        Social History Main Topics    Smoking status: Never Smoker    Smokeless tobacco: Never Used    Alcohol use No    Drug use: No    Sexual activity: Not Currently     Review of Systems   Constitution: Negative.   HENT: Negative.    Eyes: Negative.    Cardiovascular: Negative.    Respiratory: Negative for shortness of breath.    Endocrine: Negative.    Skin: Negative.    Musculoskeletal: Negative.    Gastrointestinal: Negative.    Genitourinary: Negative.    Neurological: Negative.    Psychiatric/Behavioral: Negative.    Allergic/Immunologic: Negative.      Objective:     Vital Signs (Most Recent):  Temp: 99.4 °F (37.4 °C) (03/25/18 0723)  Pulse: 86 (03/25/18 1134)  Resp: 18 (03/25/18 1134)  BP: 125/66 (03/25/18 1134)  SpO2: 99 % (03/25/18 1134) Vital Signs (24h Range):  Temp:  [98 °F (36.7 °C)-99.5 °F (37.5 °C)] 99.4 °F (37.4 °C)  Pulse:  [81-94] 86  Resp:  [18-20] 18  SpO2:  [90 %-99 %] 99 %  BP: ()/(51-67) 125/66     Weight: 77.3 kg (170 lb 6.7 oz)  Body mass index is 31.17 kg/m².    SpO2: 99 %  O2 Device (Oxygen Therapy): nasal cannula      Intake/Output Summary (Last 24 hours) at 03/25/18 1338  Last data filed at 03/24/18 1800   Gross per 24 hour   Intake              300 ml   Output                0 ml   Net              300 ml       Lines/Drains/Airways     Peripheral Intravenous Line                 Peripheral IV - Single Lumen 03/23/18 2123 Left Antecubital 1 day                Physical Exam   Constitutional: She is oriented to person, place, and time. She appears well-developed and well-nourished. No distress.   HENT:   Head: Normocephalic and atraumatic.   Nose: Nose normal.   Mouth/Throat: Oropharynx is  clear and moist.   Eyes: Conjunctivae and EOM are normal. No scleral icterus.   Neck: Normal range of motion. Neck supple. No JVD present. No thyromegaly present.   Cardiovascular: Normal rate, regular rhythm, S1 normal and S2 normal.  Exam reveals no gallop, no S3, no S4 and no friction rub.    Murmur heard.  Pulmonary/Chest: Effort normal. No stridor. No respiratory distress. She has no wheezes. She has no rales. She exhibits no tenderness.   Abdominal: Soft. Bowel sounds are normal. She exhibits no distension and no mass. There is no tenderness. There is no rebound.   Genitourinary:   Genitourinary Comments: Deferred   Musculoskeletal: Normal range of motion. She exhibits no edema, tenderness or deformity.   Lymphadenopathy:     She has no cervical adenopathy.   Neurological: She is alert and oriented to person, place, and time. She exhibits normal muscle tone. Coordination normal.   Skin: Skin is warm and dry. No rash noted. She is not diaphoretic. No erythema. No pallor.   Psychiatric: She has a normal mood and affect. Her behavior is normal. Judgment and thought content normal.   Nursing note and vitals reviewed.      Significant Labs:   All pertinent lab results from the last 24 hours have been reviewed. and   Recent Lab Results       03/25/18  0512      Anion Gap 10     Baso # 0.09     Basophil% 0.9     BUN, Bld 37(H)     Calcium 9.2     Chloride 106     CO2 23     Creatinine 2.5(H)     Differential Method Automated     eGFR if African American 20(A)     eGFR if non  18  Comment:  Calculation used to obtain the estimated glomerular filtration  rate (eGFR) is the CKD-EPI equation.   (A)     Eos # 0.9(H)     Eosinophil% 8.9(H)     Glucose 106     Gran # (ANC) 5.6     Gran% 53.9     Hematocrit 29.7(L)     Hemoglobin 9.1(L)     Lymph # 2.3     Lymph% 22.4     MCH 26.0(L)     MCHC 30.6(L)     MCV 85     Mono # 1.5(H)     Mono% 14.1     MPV 10.2     Platelets 354(H)     Potassium 4.4     RBC  3.50(L)     RDW 18.3(H)     Sodium 139     WBC 10.35           Significant Imaging: Echocardiogram: 2D echo with color flow doppler:   Results for orders placed or performed during the hospital encounter of 03/23/18   2D echo with color flow doppler   Result Value Ref Range    EF 55 55 - 65    Mitral Valve Regurgitation MILD TO MODERATE     Mitral Valve Mobility MILDLY RESTRICTED     and X-Ray: CXR: X-Ray Chest 1 View (CXR):   Results for orders placed or performed during the hospital encounter of 03/23/18   X-Ray Chest 1 View    Narrative    Portable Chest x-ray    Clinical Indication: Transient alteration of awareness.  Patient is disoriented    Findings:     No comparison studies are available.  Moderate reticular interstitial changes to the lung bases with possible vascular congestion.  The lungs are free of alveolar opacities. The cardiac silhouette size is enlarged. The trachea is midline and the mediastinal width is normal. Negative for effusion or pneumothorax. Pulmonary vasculature is congested. Negative for osseous abnormalities. Convex left curvature of the thoracic spine. There are calcifications of the aortic knob and tortuosity of the descending thoracic aorta. There are degenerative changes of spine and both shoulder girdles. There are changes of a prior median sternotomy and CABG/prosthetic valve changes.  Postoperative changes in the right axilla.    Impression          1.  Moderate root interstitial changes in the lung bases with cardiac silhouette size enlarged.  Findings are concerning for interstitial infectious process or interstitial pulmonary edema.  Clinical correlation is advised.  2.  Incidental findings as noted above.  Negative for acute process otherwise.      Electronically signed by: ALKA VELASQUEZ MD  Date:     03/23/18  Time:    22:02      Assessment and Plan:     Brief HPI: Remains in NSR, no acute events o/n.     * Atrial fibrillation, new onset    Pt converted to NSR  Cont BB, add  Dilt 120mg if HR elevated or AF  Discuss with family regarding oral AC as pt has confusion/dementia  Rec PT/OT assessment for fall risk  Pt is high risk for CVA and does require a/c but continue ASA for now  As pt will be in rehab rec anticoag with Eliquis 2.5 mg PO BID        Elevated troponin    Likely secondary to demand ischemia in setting of rapid AF  Cont asa, statin, bb  2D ECHO with WMA consistent with prior CABG/valve repair  Rec oupt stress test or follow up with cardiologist        Acute cystitis without hematuria    Cont abx per primary team        Acute pulmonary edema    S/p IV Diureses  Keep K > 4, Mg > 2        Essential hypertension    Cont meds and titrate        Hyperlipemia    Cont statin        H/O aortic valve replacement    Check 2D ECHO  Obtain records        CAD (coronary artery disease)    Cont asa, statin, bb  No chest pain  Trend cardiac enzymes till peak            VTE Risk Mitigation         Ordered     heparin (porcine) injection 5,000 Units  Every 8 hours     Route:  Subcutaneous        03/24/18 0055     IP VTE HIGH RISK PATIENT  Once      03/24/18 0036        Will sign off, please call with questions.     Aman Adair Md, MD  Cardiology  [facility] -

## 2018-03-25 NOTE — PLAN OF CARE
Pt AAOx4. POC discussed w/patient, verbalized understanding. NSR with PVCs on monitor. VSS. Voids per incontinence brief. Patient turns independently in bed. Fall precautions in place, bed alarm on, bed in lowest, call light and personal items within reach.

## 2018-03-25 NOTE — ASSESSMENT & PLAN NOTE
- CXR interstitial pulmonary edema vs infectious process (less likely infectious process as no WBC count and remains afebrile)  - Responded well to IV Lasix dose  - C/o SOB with diminished breath sounds -- give Lasix 40 mg IVP x 1 and repeat CXR in AM  - 2D ECHO with EF 55-60% and mitral valve is mildly sclerotic with mildly restricted leaflet mobility.

## 2018-03-25 NOTE — ASSESSMENT & PLAN NOTE
- Cardiology following  - Recommending Eliquis 2.5 mg PO BID for CVA prophylaxis  - Rate controlled  - Continue BB    Age 1 - 75 years old or older   CHF History 0 - no   HTN History 1 - yes   Stroke/TIA/Thromboembolism History 0 - no   Vascular Disease History 1 - yes   Diabetes Mellitus in history 0 - no   Female 1 - yes   [id number] VASc Scale Total Score 5

## 2018-03-25 NOTE — SUBJECTIVE & OBJECTIVE
Review of Systems   Constitutional: Positive for activity change.   HENT: Negative.    Eyes: Negative.    Respiratory: Positive for shortness of breath. Negative for apnea, cough, choking, chest tightness, wheezing and stridor.    Cardiovascular: Positive for leg swelling. Negative for chest pain and palpitations.   Gastrointestinal: Negative for abdominal pain, blood in stool, constipation, diarrhea, nausea and vomiting.   Endocrine: Negative.    Genitourinary: Negative.    Musculoskeletal: Negative.    Skin: Negative.    Allergic/Immunologic: Negative.    Neurological: Positive for weakness. Negative for dizziness, tremors, seizures, syncope, facial asymmetry, speech difficulty, light-headedness, numbness and headaches.   Hematological: Negative.    Psychiatric/Behavioral: Negative.      Objective:     Vital Signs (Most Recent):  Temp: 99.4 °F (37.4 °C) (03/25/18 0723)  Pulse: 86 (03/25/18 1134)  Resp: 18 (03/25/18 1134)  BP: 125/66 (03/25/18 1134)  SpO2: 99 % (03/25/18 1134) Vital Signs (24h Range):  Temp:  [98 °F (36.7 °C)-99.5 °F (37.5 °C)] 99.4 °F (37.4 °C)  Pulse:  [81-94] 86  Resp:  [18-20] 18  SpO2:  [90 %-99 %] 99 %  BP: ()/(51-67) 125/66     Weight: 77.3 kg (170 lb 6.7 oz)  Body mass index is 31.17 kg/m².    Intake/Output Summary (Last 24 hours) at 03/25/18 1444  Last data filed at 03/24/18 1800   Gross per 24 hour   Intake              300 ml   Output                0 ml   Net              300 ml      Physical Exam   Constitutional: She is oriented to person, place, and time. She appears well-developed and well-nourished. No distress.   HENT:   Head: Atraumatic.   Eyes: Conjunctivae and EOM are normal.   Neck: Neck supple.   Cardiovascular: Normal rate and intact distal pulses.  An irregular rhythm present.   A-fib noted   Pulmonary/Chest: Effort normal. No respiratory distress. She has decreased breath sounds in the right upper field, the right middle field, the right lower field, the left upper  field and the left lower field. She has no wheezes. She has no rales. She exhibits no tenderness.   Abdominal: Soft. Bowel sounds are normal. She exhibits no distension. There is no tenderness. There is no rebound and no guarding.   Genitourinary:   Genitourinary Comments: Deferred   Musculoskeletal: She exhibits edema.   Neurological: She is alert and oriented to person, place, and time. No sensory deficit.   Skin: Skin is warm and dry. Capillary refill takes less than 2 seconds.   Psychiatric: She has a normal mood and affect. Her behavior is normal. Judgment and thought content normal.   Nursing note and vitals reviewed.      Significant Labs:   BMP:   Recent Labs  Lab 03/23/18 2123 03/25/18  0512   *  < > 106     < > 139   K 3.8  < > 4.4     < > 106   CO2 22*  < > 23   BUN 29*  < > 37*   CREATININE 2.0*  < > 2.5*   CALCIUM 9.3  < > 9.2   MG 2.1  --   --    < > = values in this interval not displayed.  CBC:   Recent Labs  Lab 03/23/18 2123 03/24/18 0542 03/25/18  0512   WBC 8.75 9.99  9.99 10.35   HGB 9.6* 9.8*  9.8* 9.1*   HCT 31.1* 32.3*  32.3* 29.7*    318  318 354*       Significant Imaging:   Imaging Results          CT Head Without Contrast (Final result)  Result time 03/23/18 23:16:45    Final result by Kevyn De La Cruz MD (03/23/18 23:16:45)                 Impression:           1.  Negative for acute intracranial process. Negative for hemorrhage, or skull fracture.  2.  Cerebral atrophy, intracranial calcifications and small vessel ischemic changes noted.    3.  Stable findings as noted above.    All CT scans at this facility used dose modulation, iterative reconstruction, and/or weight based dosing when appropriate to reduce radiation dose to as low as reasonably achievable.      Electronically signed by: KEVYN DE LA CRUZ MD  Date:     03/23/18  Time:    23:16              Narrative:    Head CT scan without contrast    Clinical Indication: Confusion/delirium, altered LOC,  unexplained .  Alteration of awareness    Findings:  Comparisons are made to 06/25/2016.   The ventricles are midline and the CSF spaces are prominent. The gray-white matter junction is well preserved. Negative for intracranial vascular abnormalities. Negative for mass, mass effect, cerebral edema, hemorrhage or abnormal fluid collections.  Intracranial calcifications and small vessel ischemic changes noted. There are falx calcifications.    The skull and scalp are intact.  Mild right maxillary sinus disease noted. The rest of the paranasal sinuses, mastoid air cells, middle ears and ear canals are clear. The globes are intact.  There are postoperative changes to the right globe.                             X-Ray Chest 1 View (Final result)  Result time 03/23/18 22:02:17    Final result by Kevyn De La Cruz MD (03/23/18 22:02:17)                 Impression:          1.  Moderate root interstitial changes in the lung bases with cardiac silhouette size enlarged.  Findings are concerning for interstitial infectious process or interstitial pulmonary edema.  Clinical correlation is advised.  2.  Incidental findings as noted above.  Negative for acute process otherwise.      Electronically signed by: KEVYN DE LA CRUZ MD  Date:     03/23/18  Time:    22:02              Narrative:    Portable Chest x-ray    Clinical Indication: Transient alteration of awareness.  Patient is disoriented    Findings:     No comparison studies are available.  Moderate reticular interstitial changes to the lung bases with possible vascular congestion.  The lungs are free of alveolar opacities. The cardiac silhouette size is enlarged. The trachea is midline and the mediastinal width is normal. Negative for effusion or pneumothorax. Pulmonary vasculature is congested. Negative for osseous abnormalities. Convex left curvature of the thoracic spine. There are calcifications of the aortic knob and tortuosity of the descending thoracic aorta. There are  degenerative changes of spine and both shoulder girdles. There are changes of a prior median sternotomy and CABG/prosthetic valve changes.  Postoperative changes in the right axilla.                             US Lower Extremity Veins Bilateral (Final result)  Result time 03/23/18 22:09:31    Final result by Kevyn De La Cruz MD (03/23/18 22:09:31)                 Impression:           1.  No evidence of right or left deep venous thrombosis.      Electronically signed by: KEVYN DE LA CRUZ MD  Date:     03/23/18  Time:    22:09              Narrative:    Bilateral lower extremity DVT ultrasound:    History: Pain and right leg.  Pain in left leg..  Lower extremity swelling.    Technique: Real-time, color, and duplex evaluation of the deep venous vessels in both lower extremities were performed.    Findings:   No comparison studies are available.  No evidence of deep venous thrombosis in either lower extremity. The deep venous vessels demonstrate normal spontaneous and augmented flow with normal respiratory variation.  Deep venous vessels compress in a normal fashion throughout.

## 2018-03-25 NOTE — SUBJECTIVE & OBJECTIVE
Past Medical History:   Diagnosis Date    Anemia     CAD (coronary artery disease)     Chronic back pain     Fibromyalgia     H/O aortic valve replacement 2010    History of breast cancer 1994    Right Lumpectomy    Hyperlipemia     Hypothyroid     Osteoarthritis     PVD (peripheral vascular disease)     Renal failure     Rheumatoid arteritis     S/P CABG (coronary artery bypass graft) 91,10    Syncope and collapse 07/2016    Vitamin D deficiency        Past Surgical History:   Procedure Laterality Date    ANKLE FRACTURE SURGERY      right foot    AORTIC VALVE REPLACEMENT  2010    APPENDECTOMY      APPENDECTOMY      BREAST LUMPECTOMY Right 1994    CORONARY ARTERY BYPASS GRAFT      HYSTERECTOMY      TONSILLECTOMY         Review of patient's allergies indicates:   Allergen Reactions    Codeine      Does not remember side effects    Corticosteroids (glucocorticoids)     Diovan hct [valsartan-hydrochlorothiazide]      May drowsy    Hydrocodone      Itching, nausea and vomiting.    Indomethacin     Iodine and iodide containing products      Rash and swelling    Lortab [hydrocodone-acetaminophen]     Omnicef [cefdinir]      Rash/itching    Oxycodone      Itching    Penicillins      Vaginal yeast infection       No current facility-administered medications on file prior to encounter.      Current Outpatient Prescriptions on File Prior to Encounter   Medication Sig    ACETAMINOPHEN (TYLENOL ARTHRITIS ORAL) Take by mouth once daily.    amLODIPine (NORVASC) 5 MG tablet Take 5 mg by mouth once daily.    atorvastatin (LIPITOR) 40 MG tablet TAKE ONE TABLET BY MOUTH ONCE DAILY    calcitRIOL (ROCALTROL) 0.25 MCG Cap Take 1 capsule by mouth once daily.    carvedilol (COREG) 25 MG tablet Take 1 tablet by mouth once daily.    ERGOCALCIFEROL, VITAMIN D2, (VITAMIN D2 ORAL) Take by mouth once daily.    hydrALAZINE (APRESOLINE) 25 MG tablet 25 mg 2 (two) times daily.     levothyroxine (SYNTHROID)  75 MCG tablet Take 1 tablet (75 mcg total) by mouth once daily.    zolpidem (AMBIEN) 10 mg Tab TAKE ONE TABLET BY MOUTH AT BEDTIME AS NEEDED     Family History     None        Social History Main Topics    Smoking status: Never Smoker    Smokeless tobacco: Never Used    Alcohol use No    Drug use: No    Sexual activity: Not Currently     Review of Systems   Constitution: Negative.   HENT: Negative.    Eyes: Negative.    Cardiovascular: Negative.    Respiratory: Negative for shortness of breath.    Endocrine: Negative.    Skin: Negative.    Musculoskeletal: Negative.    Gastrointestinal: Negative.    Genitourinary: Negative.    Neurological: Negative.    Psychiatric/Behavioral: Negative.    Allergic/Immunologic: Negative.      Objective:     Vital Signs (Most Recent):  Temp: 99.4 °F (37.4 °C) (03/25/18 0723)  Pulse: 86 (03/25/18 1134)  Resp: 18 (03/25/18 1134)  BP: 125/66 (03/25/18 1134)  SpO2: 99 % (03/25/18 1134) Vital Signs (24h Range):  Temp:  [98 °F (36.7 °C)-99.5 °F (37.5 °C)] 99.4 °F (37.4 °C)  Pulse:  [81-94] 86  Resp:  [18-20] 18  SpO2:  [90 %-99 %] 99 %  BP: ()/(51-67) 125/66     Weight: 77.3 kg (170 lb 6.7 oz)  Body mass index is 31.17 kg/m².    SpO2: 99 %  O2 Device (Oxygen Therapy): nasal cannula      Intake/Output Summary (Last 24 hours) at 03/25/18 1338  Last data filed at 03/24/18 1800   Gross per 24 hour   Intake              300 ml   Output                0 ml   Net              300 ml       Lines/Drains/Airways     Peripheral Intravenous Line                 Peripheral IV - Single Lumen 03/23/18 2123 Left Antecubital 1 day                Physical Exam   Constitutional: She is oriented to person, place, and time. She appears well-developed and well-nourished. No distress.   HENT:   Head: Normocephalic and atraumatic.   Nose: Nose normal.   Mouth/Throat: Oropharynx is clear and moist.   Eyes: Conjunctivae and EOM are normal. No scleral icterus.   Neck: Normal range of motion. Neck supple.  No JVD present. No thyromegaly present.   Cardiovascular: Normal rate, regular rhythm, S1 normal and S2 normal.  Exam reveals no gallop, no S3, no S4 and no friction rub.    Murmur heard.  Pulmonary/Chest: Effort normal. No stridor. No respiratory distress. She has no wheezes. She has no rales. She exhibits no tenderness.   Abdominal: Soft. Bowel sounds are normal. She exhibits no distension and no mass. There is no tenderness. There is no rebound.   Genitourinary:   Genitourinary Comments: Deferred   Musculoskeletal: Normal range of motion. She exhibits no edema, tenderness or deformity.   Lymphadenopathy:     She has no cervical adenopathy.   Neurological: She is alert and oriented to person, place, and time. She exhibits normal muscle tone. Coordination normal.   Skin: Skin is warm and dry. No rash noted. She is not diaphoretic. No erythema. No pallor.   Psychiatric: She has a normal mood and affect. Her behavior is normal. Judgment and thought content normal.   Nursing note and vitals reviewed.      Significant Labs:   All pertinent lab results from the last 24 hours have been reviewed. and   Recent Lab Results       03/25/18  0512      Anion Gap 10     Baso # 0.09     Basophil% 0.9     BUN, Bld 37(H)     Calcium 9.2     Chloride 106     CO2 23     Creatinine 2.5(H)     Differential Method Automated     eGFR if African American 20(A)     eGFR if non  18  Comment:  Calculation used to obtain the estimated glomerular filtration  rate (eGFR) is the CKD-EPI equation.   (A)     Eos # 0.9(H)     Eosinophil% 8.9(H)     Glucose 106     Gran # (ANC) 5.6     Gran% 53.9     Hematocrit 29.7(L)     Hemoglobin 9.1(L)     Lymph # 2.3     Lymph% 22.4     MCH 26.0(L)     MCHC 30.6(L)     MCV 85     Mono # 1.5(H)     Mono% 14.1     MPV 10.2     Platelets 354(H)     Potassium 4.4     RBC 3.50(L)     RDW 18.3(H)     Sodium 139     WBC 10.35           Significant Imaging: Echocardiogram: 2D echo with color flow  doppler:   Results for orders placed or performed during the hospital encounter of 03/23/18   2D echo with color flow doppler   Result Value Ref Range    EF 55 55 - 65    Mitral Valve Regurgitation MILD TO MODERATE     Mitral Valve Mobility MILDLY RESTRICTED     and X-Ray: CXR: X-Ray Chest 1 View (CXR):   Results for orders placed or performed during the hospital encounter of 03/23/18   X-Ray Chest 1 View    Narrative    Portable Chest x-ray    Clinical Indication: Transient alteration of awareness.  Patient is disoriented    Findings:     No comparison studies are available.  Moderate reticular interstitial changes to the lung bases with possible vascular congestion.  The lungs are free of alveolar opacities. The cardiac silhouette size is enlarged. The trachea is midline and the mediastinal width is normal. Negative for effusion or pneumothorax. Pulmonary vasculature is congested. Negative for osseous abnormalities. Convex left curvature of the thoracic spine. There are calcifications of the aortic knob and tortuosity of the descending thoracic aorta. There are degenerative changes of spine and both shoulder girdles. There are changes of a prior median sternotomy and CABG/prosthetic valve changes.  Postoperative changes in the right axilla.    Impression          1.  Moderate root interstitial changes in the lung bases with cardiac silhouette size enlarged.  Findings are concerning for interstitial infectious process or interstitial pulmonary edema.  Clinical correlation is advised.  2.  Incidental findings as noted above.  Negative for acute process otherwise.      Electronically signed by: ALKA VELASQUEZ MD  Date:     03/23/18  Time:    22:02

## 2018-03-25 NOTE — PROGRESS NOTES
[facility] Hospital Medicine  Progress Note    Patient Name: Anu Blanco  MRN: [id number]  Patient Class: IP- Inpatient   Admission Date: 3/23/2018  Length of Stay: 1 days  Attending Physician: Albert Willard MD  Primary Care Provider: Serena Chu MD        Subjective:     Principal Problem:Atrial fibrillation, new onset    HPI:  Anu Blanco is a 78 y.o. female patient  presents for compliant of bilateral leg pain/swelling. Onset few days ago and continues to worsen. Reportedly swelling to her left leg normal, but her right leg has been swelling which has made if difficulty for pt to walk per daughter at bedside. Pt's family deny a hx of DVT or PE. Pt was also reportedly tested for dementia, reports negative per daughter. Walking exacerbates. Associated symptoms: worsening confusion. The patient was evaluated in the Er. Patient presented to Er in A-fib with no history. Over ER course ventricular rate worsened (max of 160s), improved and rate better controlled 100-110s post cardizem push given in Er. Hospital medicine was consulted for admission. CBC, CMP stable with baseline reviewed in care everywhere. No known baseline for troponin, today 0.040, . TSH normal. Mg/Phos neg. CT head impression: NAF. Chest Xray impression: Moderate root interstitial changes in the lung bases with cardiac silhouette size enlarged. Findings are concerning for interstitial infectious process or interstitial pulmonary edema.  Clinical correlation is advised. Patient afebrile, no wbc, no cough. Patient placed in obs for new onset afib, acute pulmonary edema.       Hospital Course:  Anu Blanco is a 78 year old female admitted for new onset A-fib. Pt seen and examined. Pt currently in SR s/p cardizem 10 mg IVP x 1 in ED. A-fib may be secondary to UTI and/or pulmonary edema. Cardiology consulted -- high risk for CVA, requires anticoagulation but will continue ASA for now. Will continue to diurese, pt  reports SOB improved.     Age 2 - 75 years old or older   CHF History 0 - no   HTN History 1 - yes   Stroke/TIA/Thromboembolism History 0 - no   Vascular Disease History 1 - yes   Diabetes Mellitus in history 0 - no   Female 1 - yes   [id number] VASc Scale Total Score 5      Pt currently receiving IV Rocephin for UTI. Urine culture pending. Pt reports having dysuria and urinary urgency. Pt oriented to person and place currently. BBS are clear and equal to auscultation, no wheezing, rhonchi noted. Pt denies chest pain, SOB. Abdominal assessment unremarkable. Pt c/o mild dysuria. Will get PT/OT to evaluate and treat -- pt was seen by her PCP on 11/11/17 for follow up for a fall on 10/27/17. Home health was set up but PCP did mention she may need inpatient rehab if cannot be managed. Will continue to monitor    03/25/18:  No acute events overnight. Baseline creatinine around 1.9, currently 2.5. Pulmonary edema on CXR. Pt was initially given Lasix but then discontinued. Pt c/o mild SOB and has diminished breath sounds anteriorly. Will give one time dose of Lasix and repeat CXR in AM. Pt currently sitting up in chair at bedside. Pt has no complaints of chest pain. A-fib, rate controlled. OT recommending SNF placement. Awaiting PT recommendations. Discussed with patient about possibility of SNF placement and she has agreed to placement if needed. Continue IV Rocephin for UTI, culture pending.        Review of Systems   Constitutional: Positive for activity change.   HENT: Negative.    Eyes: Negative.    Respiratory: Positive for shortness of breath. Negative for apnea, cough, choking, chest tightness, wheezing and stridor.    Cardiovascular: Positive for leg swelling. Negative for chest pain and palpitations.   Gastrointestinal: Negative for abdominal pain, blood in stool, constipation, diarrhea, nausea and vomiting.   Endocrine: Negative.    Genitourinary: Negative.    Musculoskeletal: Negative.    Skin: Negative.     Allergic/Immunologic: Negative.    Neurological: Positive for weakness. Negative for dizziness, tremors, seizures, syncope, facial asymmetry, speech difficulty, light-headedness, numbness and headaches.   Hematological: Negative.    Psychiatric/Behavioral: Negative.      Objective:     Vital Signs (Most Recent):  Temp: 99.4 °F (37.4 °C) (03/25/18 0723)  Pulse: 86 (03/25/18 1134)  Resp: 18 (03/25/18 1134)  BP: 125/66 (03/25/18 1134)  SpO2: 99 % (03/25/18 1134) Vital Signs (24h Range):  Temp:  [98 °F (36.7 °C)-99.5 °F (37.5 °C)] 99.4 °F (37.4 °C)  Pulse:  [81-94] 86  Resp:  [18-20] 18  SpO2:  [90 %-99 %] 99 %  BP: ()/(51-67) 125/66     Weight: 77.3 kg (170 lb 6.7 oz)  Body mass index is 31.17 kg/m².    Intake/Output Summary (Last 24 hours) at 03/25/18 1444  Last data filed at 03/24/18 1800   Gross per 24 hour   Intake              300 ml   Output                0 ml   Net              300 ml      Physical Exam   Constitutional: She is oriented to person, place, and time. She appears well-developed and well-nourished. No distress.   HENT:   Head: Atraumatic.   Eyes: Conjunctivae and EOM are normal.   Neck: Neck supple.   Cardiovascular: Normal rate and intact distal pulses.  An irregular rhythm present.   A-fib noted   Pulmonary/Chest: Effort normal. No respiratory distress. She has decreased breath sounds in the right upper field, the right middle field, the right lower field, the left upper field and the left lower field. She has no wheezes. She has no rales. She exhibits no tenderness.   Abdominal: Soft. Bowel sounds are normal. She exhibits no distension. There is no tenderness. There is no rebound and no guarding.   Genitourinary:   Genitourinary Comments: Deferred   Musculoskeletal: She exhibits edema.   Neurological: She is alert and oriented to person, place, and time. No sensory deficit.   Skin: Skin is warm and dry. Capillary refill takes less than 2 seconds.   Psychiatric: She has a normal mood and  affect. Her behavior is normal. Judgment and thought content normal.   Nursing note and vitals reviewed.      Significant Labs:   BMP:   Recent Labs  Lab 03/23/18 2123 03/25/18 0512   *  < > 106     < > 139   K 3.8  < > 4.4     < > 106   CO2 22*  < > 23   BUN 29*  < > 37*   CREATININE 2.0*  < > 2.5*   CALCIUM 9.3  < > 9.2   MG 2.1  --   --    < > = values in this interval not displayed.  CBC:   Recent Labs  Lab 03/23/18 2123 03/24/18 0542 03/25/18 0512   WBC 8.75 9.99  9.99 10.35   HGB 9.6* 9.8*  9.8* 9.1*   HCT 31.1* 32.3*  32.3* 29.7*    318  318 354*       Significant Imaging:   Imaging Results          CT Head Without Contrast (Final result)  Result time 03/23/18 23:16:45    Final result by Kevyn De La Cruz MD (03/23/18 23:16:45)                 Impression:           1.  Negative for acute intracranial process. Negative for hemorrhage, or skull fracture.  2.  Cerebral atrophy, intracranial calcifications and small vessel ischemic changes noted.    3.  Stable findings as noted above.    All CT scans at this facility used dose modulation, iterative reconstruction, and/or weight based dosing when appropriate to reduce radiation dose to as low as reasonably achievable.      Electronically signed by: KEVYN DE LA CRUZ MD  Date:     03/23/18  Time:    23:16              Narrative:    Head CT scan without contrast    Clinical Indication: Confusion/delirium, altered LOC, unexplained .  Alteration of awareness    Findings:  Comparisons are made to 06/25/2016.   The ventricles are midline and the CSF spaces are prominent. The gray-white matter junction is well preserved. Negative for intracranial vascular abnormalities. Negative for mass, mass effect, cerebral edema, hemorrhage or abnormal fluid collections.  Intracranial calcifications and small vessel ischemic changes noted. There are falx calcifications.    The skull and scalp are intact.  Mild right maxillary sinus disease noted. The rest  of the paranasal sinuses, mastoid air cells, middle ears and ear canals are clear. The globes are intact.  There are postoperative changes to the right globe.                             X-Ray Chest 1 View (Final result)  Result time 03/23/18 22:02:17    Final result by Kevyn De La Cruz MD (03/23/18 22:02:17)                 Impression:          1.  Moderate root interstitial changes in the lung bases with cardiac silhouette size enlarged.  Findings are concerning for interstitial infectious process or interstitial pulmonary edema.  Clinical correlation is advised.  2.  Incidental findings as noted above.  Negative for acute process otherwise.      Electronically signed by: KEVYN DE LA CRUZ MD  Date:     03/23/18  Time:    22:02              Narrative:    Portable Chest x-ray    Clinical Indication: Transient alteration of awareness.  Patient is disoriented    Findings:     No comparison studies are available.  Moderate reticular interstitial changes to the lung bases with possible vascular congestion.  The lungs are free of alveolar opacities. The cardiac silhouette size is enlarged. The trachea is midline and the mediastinal width is normal. Negative for effusion or pneumothorax. Pulmonary vasculature is congested. Negative for osseous abnormalities. Convex left curvature of the thoracic spine. There are calcifications of the aortic knob and tortuosity of the descending thoracic aorta. There are degenerative changes of spine and both shoulder girdles. There are changes of a prior median sternotomy and CABG/prosthetic valve changes.  Postoperative changes in the right axilla.                             US Lower Extremity Veins Bilateral (Final result)  Result time 03/23/18 22:09:31    Final result by Kevyn De La Cruz MD (03/23/18 22:09:31)                 Impression:           1.  No evidence of right or left deep venous thrombosis.      Electronically signed by: KEVYN DE LA CRUZ MD  Date:     03/23/18  Time:    22:09               Narrative:    Bilateral lower extremity DVT ultrasound:    History: Pain and right leg.  Pain in left leg..  Lower extremity swelling.    Technique: Real-time, color, and duplex evaluation of the deep venous vessels in both lower extremities were performed.    Findings:   No comparison studies are available.  No evidence of deep venous thrombosis in either lower extremity. The deep venous vessels demonstrate normal spontaneous and augmented flow with normal respiratory variation.  Deep venous vessels compress in a normal fashion throughout.                            Assessment/Plan:      * Atrial fibrillation, new onset    - Cardiology following  - Recommending Eliquis 2.5 mg PO BID for CVA prophylaxis  - Rate controlled  - Continue BB    Age 2 - 75 years old or older   CHF History 0 - no   HTN History 1 - yes   Stroke/TIA/Thromboembolism History 0 - no   Vascular Disease History 1 - yes   Diabetes Mellitus in history 0 - no   Female 1 - yes   [id number] VASc Scale Total Score 5             Acute cystitis without hematuria      Reviewed patient urine results. procal +, Crp+. Paitent with +WBC and Leukocyte. Rocephin started. Urine culture added. Consider diflucan post antx as patient complains of vaginal yeast infection with antx treatments.         Acute pulmonary edema    - CXR interstitial pulmonary edema vs infectious process (less likely infectious process as no WBC count and remains afebrile)  - Responded well to IV Lasix dose  - C/o SOB with diminished breath sounds -- give Lasix 40 mg IVP x 1 and repeat CXR in AM  - 2D ECHO with EF 55-60% and mitral valve is mildly sclerotic with mildly restricted leaflet mobility.            Essential hypertension    - Stable  - Continue home meds  - Will continue to monitor        Hypothyroid    - TSH 1.468  - Continue Levothyroxine          CAD (coronary artery disease)    - Continue Statin and BB            VTE Risk Mitigation         Ordered     apixaban tablet  2.5 mg  2 times daily     Route:  Oral        03/25/18 1505     IP VTE HIGH RISK PATIENT  Once      03/24/18 0036              TIANNA Sexton  Department of Hospital Medicine   [facility]

## 2018-03-25 NOTE — ASSESSMENT & PLAN NOTE
Pt converted to NSR  Cont BB, add Dilt 120mg if HR elevated or AF  Discuss with family regarding oral AC as pt has confusion/dementia  Rec PT/OT assessment for fall risk  Pt is high risk for CVA and does require a/c but continue ASA for now  As pt will be in rehab rec anticoag with Eliquis 2.5 mg PO BID

## 2018-03-26 PROBLEM — N17.9 AKI (ACUTE KIDNEY INJURY): Status: ACTIVE | Noted: 2018-03-26

## 2018-03-26 PROBLEM — E83.52 HYPERCALCEMIA: Status: ACTIVE | Noted: 2018-03-26

## 2018-03-26 PROBLEM — I95.2 HYPOTENSION DUE TO DRUGS: Status: ACTIVE | Noted: 2018-03-26

## 2018-03-26 LAB
ANION GAP SERPL CALC-SCNC: 11 MMOL/L
BACTERIA UR CULT: NORMAL
BACTERIA UR CULT: NORMAL
BASOPHILS # BLD AUTO: 0.06 K/UL
BASOPHILS NFR BLD: 0.6 %
BUN SERPL-MCNC: 43 MG/DL
CALCIUM SERPL-MCNC: 9.3 MG/DL
CHLORIDE SERPL-SCNC: 104 MMOL/L
CO2 SERPL-SCNC: 25 MMOL/L
CREAT SERPL-MCNC: 2.9 MG/DL
DIFFERENTIAL METHOD: ABNORMAL
EOSINOPHIL # BLD AUTO: 0.4 K/UL
EOSINOPHIL NFR BLD: 4.4 %
ERYTHROCYTE [DISTWIDTH] IN BLOOD BY AUTOMATED COUNT: 18.3 %
EST. GFR  (AFRICAN AMERICAN): 17 ML/MIN/1.73 M^2
EST. GFR  (NON AFRICAN AMERICAN): 15 ML/MIN/1.73 M^2
GLUCOSE SERPL-MCNC: 101 MG/DL
HCT VFR BLD AUTO: 29.3 %
HGB BLD-MCNC: 9 G/DL
LYMPHOCYTES # BLD AUTO: 2.3 K/UL
LYMPHOCYTES NFR BLD: 22.7 %
MCH RBC QN AUTO: 26.2 PG
MCHC RBC AUTO-ENTMCNC: 30.7 G/DL
MCV RBC AUTO: 85 FL
MONOCYTES # BLD AUTO: 1.5 K/UL
MONOCYTES NFR BLD: 14.5 %
NEUTROPHILS # BLD AUTO: 5.8 K/UL
NEUTROPHILS NFR BLD: 58 %
OVALOCYTES BLD QL SMEAR: ABNORMAL
PLATELET # BLD AUTO: 337 K/UL
PMV BLD AUTO: 10.4 FL
POIKILOCYTOSIS BLD QL SMEAR: ABNORMAL
POTASSIUM SERPL-SCNC: 4.5 MMOL/L
RBC # BLD AUTO: 3.44 M/UL
SCHISTOCYTES BLD QL SMEAR: PRESENT
SODIUM SERPL-SCNC: 140 MMOL/L
WBC # BLD AUTO: 10.01 K/UL

## 2018-03-26 PROCEDURE — 99900035 HC TECH TIME PER 15 MIN (STAT)

## 2018-03-26 PROCEDURE — 85025 COMPLETE CBC W/AUTO DIFF WBC: CPT

## 2018-03-26 PROCEDURE — 63600175 PHARM REV CODE 636 W HCPCS: Performed by: NURSE PRACTITIONER

## 2018-03-26 PROCEDURE — 99223 1ST HOSP IP/OBS HIGH 75: CPT | Mod: ,,, | Performed by: INTERNAL MEDICINE

## 2018-03-26 PROCEDURE — A4216 STERILE WATER/SALINE, 10 ML: HCPCS | Performed by: NURSE PRACTITIONER

## 2018-03-26 PROCEDURE — 97110 THERAPEUTIC EXERCISES: CPT

## 2018-03-26 PROCEDURE — 94640 AIRWAY INHALATION TREATMENT: CPT

## 2018-03-26 PROCEDURE — 97116 GAIT TRAINING THERAPY: CPT

## 2018-03-26 PROCEDURE — 80048 BASIC METABOLIC PNL TOTAL CA: CPT

## 2018-03-26 PROCEDURE — 25000003 PHARM REV CODE 250: Performed by: NURSE PRACTITIONER

## 2018-03-26 PROCEDURE — 27000221 HC OXYGEN, UP TO 24 HOURS

## 2018-03-26 PROCEDURE — 21400001 HC TELEMETRY ROOM

## 2018-03-26 PROCEDURE — 36415 COLL VENOUS BLD VENIPUNCTURE: CPT

## 2018-03-26 PROCEDURE — 94761 N-INVAS EAR/PLS OXIMETRY MLT: CPT

## 2018-03-26 PROCEDURE — 25000242 PHARM REV CODE 250 ALT 637 W/ HCPCS: Performed by: NURSE PRACTITIONER

## 2018-03-26 RX ADMIN — HYDRALAZINE HYDROCHLORIDE 25 MG: 25 TABLET, FILM COATED ORAL at 09:03

## 2018-03-26 RX ADMIN — POLYETHYLENE GLYCOL 3350 17 G: 17 POWDER, FOR SOLUTION ORAL at 09:03

## 2018-03-26 RX ADMIN — AMLODIPINE BESYLATE 5 MG: 5 TABLET ORAL at 09:03

## 2018-03-26 RX ADMIN — IPRATROPIUM BROMIDE 0.5 MG: 0.5 SOLUTION RESPIRATORY (INHALATION) at 04:03

## 2018-03-26 RX ADMIN — APIXABAN 2.5 MG: 2.5 TABLET, FILM COATED ORAL at 09:03

## 2018-03-26 RX ADMIN — SODIUM CHLORIDE, PRESERVATIVE FREE 3 ML: 5 INJECTION INTRAVENOUS at 09:03

## 2018-03-26 RX ADMIN — SODIUM CHLORIDE, PRESERVATIVE FREE 3 ML: 5 INJECTION INTRAVENOUS at 05:03

## 2018-03-26 RX ADMIN — SODIUM CHLORIDE, PRESERVATIVE FREE 3 ML: 5 INJECTION INTRAVENOUS at 02:03

## 2018-03-26 RX ADMIN — ATORVASTATIN CALCIUM 40 MG: 40 TABLET, FILM COATED ORAL at 09:03

## 2018-03-26 RX ADMIN — LEVOTHYROXINE SODIUM 75 MCG: 75 TABLET ORAL at 05:03

## 2018-03-26 RX ADMIN — CARVEDILOL 25 MG: 12.5 TABLET, FILM COATED ORAL at 09:03

## 2018-03-26 RX ADMIN — CALCITRIOL 0.25 MCG: 0.25 CAPSULE, LIQUID FILLED ORAL at 09:03

## 2018-03-26 RX ADMIN — IPRATROPIUM BROMIDE 0.5 MG: 0.5 SOLUTION RESPIRATORY (INHALATION) at 12:03

## 2018-03-26 RX ADMIN — CEFTRIAXONE 1 G: 1 INJECTION, SOLUTION INTRAVENOUS at 09:03

## 2018-03-26 NOTE — PROGRESS NOTES
"Pt alert and oriented 2 patient identifiers given. Neb treatment procedure explained then placed. Pt immediately requested to take neb off and does not wish to take medicine. Pt states she " does not need" .   "

## 2018-03-26 NOTE — ASSESSMENT & PLAN NOTE
Renal: EDELMIRA.  Likely prerenal azotemia due to low BP/hypotension and decreased renal perfusion from AF  Has mild hypercalcemia due to calcitriol, contributes to EDELMIRA  Hypotension  On multiple BP meds  Suggest reduce hydralazine and amlodipine  Minimize/hold diuretics, no significant fluid gain.

## 2018-03-26 NOTE — CONSULTS
[facility] -   Nephrology  Consult Note    Patient Name: Anu Blanco  MRN: [id number]  Admission Date: 3/23/2018  Hospital Length of Stay: 2 days  Attending Provider: Alda Hanson MD   Primary Care Physician: Serena Chu MD  Principal Problem:Atrial fibrillation, new onset. EDELMIRA    Referring physician: Dr Hanson  Reason for consult: EDELMIRA    Consults  Subjective:     HPI: Pt was seen and examined. H/o and chart reviewed. Pt is a 77 y/o female who was presented with SOB to ER. Pt was in new onset AF. She had some LE edema and received IV lasix. Since then s Cr has worsened. Any LE swelling is not impressive on exam. Pt herself is not a good historian, but has no c/o's, no SOB, no CP, no discomfort. Noted BP is low. Noted pt on multiple BP meds. Noted corrected Ca for alb is slightly elevated, pt is receiving calcitriol.     Past Medical History:   Diagnosis Date    Anemia     CAD (coronary artery disease)     Chronic back pain     Fibromyalgia     H/O aortic valve replacement 2010    History of breast cancer 1994    Right Lumpectomy    Hyperlipemia     Hypothyroid     Osteoarthritis     PVD (peripheral vascular disease)     Renal failure     Rheumatoid arteritis     S/P CABG (coronary artery bypass graft) 91,10    Syncope and collapse 07/2016    Vitamin D deficiency        Past Surgical History:   Procedure Laterality Date    ANKLE FRACTURE SURGERY      right foot    AORTIC VALVE REPLACEMENT  2010    APPENDECTOMY      APPENDECTOMY      BREAST LUMPECTOMY Right 1994    CORONARY ARTERY BYPASS GRAFT      HYSTERECTOMY      TONSILLECTOMY         Review of patient's allergies indicates:   Allergen Reactions    Codeine      Does not remember side effects    Corticosteroids (glucocorticoids)     Diovan hct [valsartan-hydrochlorothiazide]      May drowsy    Hydrocodone      Itching, nausea and vomiting.    Indomethacin     Iodine and iodide containing products      Rash and  swelling    Lortab [hydrocodone-acetaminophen]     Omnicef [cefdinir]      Rash/itching    Oxycodone      Itching    Penicillins      Vaginal yeast infection     Current Facility-Administered Medications   Medication Frequency    amLODIPine tablet 5 mg Daily    apixaban tablet 2.5 mg BID    atorvastatin tablet 40 mg Daily    calcitRIOL capsule 0.25 mcg Daily    carvedilol tablet 25 mg Daily    cefTRIAXone (ROCEPHIN) 1 g in dextrose 5 % 50 mL IVPB Q24H    diphenhydrAMINE injection 12.5 mg Q6H PRN    hydrALAZINE tablet 25 mg Q12H    ipratropium 0.02 % nebulizer solution 0.5 mg Q8H    levothyroxine tablet 75 mcg Daily    ondansetron injection 4 mg Q8H PRN    polyethylene glycol packet 17 g Daily    sodium chloride 0.9% flush 3 mL Q8H     Family History     None        Social History Main Topics    Smoking status: Never Smoker    Smokeless tobacco: Never Used    Alcohol use No    Drug use: No    Sexual activity: Not Currently     Review of Systems   Constitutional: Negative.    HENT: Negative.    Respiratory: Positive for shortness of breath.    Cardiovascular: Negative.    Genitourinary: Negative.    Musculoskeletal: Negative.    Neurological: Negative.    Psychiatric/Behavioral: Negative.      Objective:     Vital Signs (Most Recent):  Temp: 98.1 °F (36.7 °C) (03/26/18 1504)  Pulse: 80 (03/26/18 1504)  Resp: 18 (03/26/18 1504)  BP: 102/60 (03/26/18 1504)  SpO2: 97 % (03/26/18 1504)  O2 Device (Oxygen Therapy): nasal cannula (03/26/18 1132) Vital Signs (24h Range):  Temp:  [97.1 °F (36.2 °C)-98.7 °F (37.1 °C)] 98.1 °F (36.7 °C)  Pulse:  [80-89] 80  Resp:  [17-20] 18  SpO2:  [95 %-99 %] 97 %  BP: (102-141)/(52-67) 102/60     Weight: 76.6 kg (168 lb 14 oz) (03/26/18 0544)  Body mass index is 30.89 kg/m².  Body surface area is 1.83 meters squared.    I/O last 3 completed shifts:  In: 50 [IV Piggyback:50]  Out: -     Physical Exam   Constitutional: She appears well-developed and well-nourished.    HENT:   Head: Normocephalic and atraumatic.   Neck: No JVD present.   Cardiovascular: Exam reveals no friction rub.    Irregular   Pulmonary/Chest: Effort normal and breath sounds normal. No respiratory distress. She has no rales.   Abdominal: Soft. Bowel sounds are normal.   Musculoskeletal: She exhibits no edema.   Neurological: She is alert.   Skin: Skin is dry.   Psychiatric: She has a normal mood and affect.   Nursing note and vitals reviewed.      Significant Labs: reviewed  BMP  Lab Results   Component Value Date     03/26/2018    K 4.5 03/26/2018     03/26/2018    CO2 25 03/26/2018    BUN 43 (H) 03/26/2018    CREATININE 2.9 (H) 03/26/2018    CALCIUM 9.3 03/26/2018    ANIONGAP 11 03/26/2018    ESTGFRAFRICA 17 (A) 03/26/2018    EGFRNONAA 15 (A) 03/26/2018     Lab Results   Component Value Date    WBC 10.01 03/26/2018    HGB 9.0 (L) 03/26/2018    HCT 29.3 (L) 03/26/2018    MCV 85 03/26/2018     03/26/2018       Significant Imaging: reviewed    Assessment/Plan:   77 y/o female with EDELMIRA after treatment for AF and LEv edema:    EDELMIRA (acute kidney injury)    Renal: EDELMIRA.  Likely prerenal azotemia due to low BP/hypotension and decreased renal perfusion from AF  Has mild hypercalcemia due to calcitriol, contributes to EDELMIRA  Hypotension  On multiple BP meds  Suggest reduce hydralazine and amlodipine  Minimize/hold diuretics, no significant fluid gain.       * Atrial fibrillation, new onset    Cardiac: presented with AF.  Still in AF  Remains hypotensive  H/o of CAD  May have diastolic CHF (bilateral ankle swelling), though echo did not indicate.                   Plans and recommendations:  As discussed above  D/c hydralazine  D/c calcitriol  Send urine and Cr  Hold d/c for reason for persistent hypotension, still in AF, new EDELMIRA, hypercalcemia.  Will need reevaluation after above changes in am.  Total time spent 70 minutes including time needed to review the records, the   patient evaluation,  documentation, face-to-face discussion with the patient,   more than 50% of the time was spent on coordination of care and counseling.    Level V visit.           James Smiley MD  Nephrology  [facility] [place]

## 2018-03-26 NOTE — ASSESSMENT & PLAN NOTE
Cardiac: presented with AF.  Still in AF  Remains hypotensive  H/o of CAD  May have diastolic CHF (bilateral ankle swelling), though echo did not indicate.

## 2018-03-26 NOTE — PLAN OF CARE
Problem: Occupational Therapy Goal  Goal: Occupational Therapy Goal  Outcome: Ongoing (interventions implemented as appropriate)  Goals to met by 3-31-18  1. pt will tolerate set x 10 reps b ue rom exercise with min resistance  2. sba with le dressing  3. sba with sit<>stand t/f's  4. sba with toilet t/f's

## 2018-03-26 NOTE — SUBJECTIVE & OBJECTIVE
Interval History: Patient seen and examined. No complaints of chest pain or shortness of breath. creatinine bumped to 2.9 this AM, baseline 1.9. Nephrology Consulted.      Review of Systems   Constitutional: Positive for fatigue. Negative for chills and fever.   HENT: Negative for congestion, rhinorrhea and sinus pressure.    Respiratory: Negative for apnea, cough, choking, chest tightness, shortness of breath, wheezing and stridor.    Cardiovascular: Negative for chest pain, palpitations and leg swelling.   Gastrointestinal: Negative for abdominal distention, abdominal pain, diarrhea, nausea and vomiting.   Endocrine: Negative for cold intolerance and heat intolerance.   Genitourinary: Negative for difficulty urinating and hematuria.   Musculoskeletal: Negative for arthralgias and joint swelling.   Skin: Negative for color change, pallor and rash.   Neurological: Positive for weakness. Negative for dizziness, seizures, numbness and headaches.   Psychiatric/Behavioral: Negative for agitation. The patient is not nervous/anxious.      Objective:     Vital Signs (Most Recent):  Temp: 98 °F (36.7 °C) (03/26/18 0725)  Pulse: 89 (03/26/18 1132)  Resp: 17 (03/26/18 1132)  BP: 138/67 (03/26/18 1132)  SpO2: 99 % (03/26/18 1132) Vital Signs (24h Range):  Temp:  [97.1 °F (36.2 °C)-98.7 °F (37.1 °C)] 98 °F (36.7 °C)  Pulse:  [84-95] 89  Resp:  [17-20] 17  SpO2:  [95 %-99 %] 99 %  BP: (109-141)/(52-67) 138/67     Weight: 76.6 kg (168 lb 14 oz)  Body mass index is 30.89 kg/m².  No intake or output data in the 24 hours ending 03/26/18 1136   Physical Exam   Constitutional: No distress.   HENT:   Head: Normocephalic and atraumatic.   Eyes: Right eye exhibits no discharge. Left eye exhibits no discharge.   Neck: No JVD present.   Cardiovascular: An irregular rhythm present. Exam reveals no gallop and no friction rub.    No murmur heard.  Pulmonary/Chest: No respiratory distress. She has no wheezes. She has no rales. She exhibits no  tenderness.   Abdominal: She exhibits no distension. There is no tenderness. There is no rebound and no guarding. No hernia.   Musculoskeletal: She exhibits no edema or deformity.   Neurological: She is alert. She displays normal reflexes. No cranial nerve deficit or sensory deficit. Coordination normal.   Skin: Skin is warm and dry. Capillary refill takes less than 2 seconds. She is not diaphoretic.   Psychiatric:   Confusion present    Nursing note and vitals reviewed.      Significant Labs:   CBC:   Recent Labs  Lab 03/25/18  0512 03/26/18  0528   WBC 10.35 10.01   HGB 9.1* 9.0*   HCT 29.7* 29.3*   * 337     CMP:   Recent Labs  Lab 03/25/18 0512 03/26/18  0528    140   K 4.4 4.5    104   CO2 23 25    101   BUN 37* 43*   CREATININE 2.5* 2.9*   CALCIUM 9.2 9.3   ANIONGAP 10 11   EGFRNONAA 18* 15*       Significant Imaging:     Imaging Results          CT Head Without Contrast (Final result)  Result time 03/23/18 23:16:45    Final result by Kevyn De La Cruz MD (03/23/18 23:16:45)                 Impression:           1.  Negative for acute intracranial process. Negative for hemorrhage, or skull fracture.  2.  Cerebral atrophy, intracranial calcifications and small vessel ischemic changes noted.    3.  Stable findings as noted above.    All CT scans at this facility used dose modulation, iterative reconstruction, and/or weight based dosing when appropriate to reduce radiation dose to as low as reasonably achievable.      Electronically signed by: KEVYN DE LA CRUZ MD  Date:     03/23/18  Time:    23:16              Narrative:    Head CT scan without contrast    Clinical Indication: Confusion/delirium, altered LOC, unexplained .  Alteration of awareness    Findings:  Comparisons are made to 06/25/2016.   The ventricles are midline and the CSF spaces are prominent. The gray-white matter junction is well preserved. Negative for intracranial vascular abnormalities. Negative for mass, mass effect,  cerebral edema, hemorrhage or abnormal fluid collections.  Intracranial calcifications and small vessel ischemic changes noted. There are falx calcifications.    The skull and scalp are intact.  Mild right maxillary sinus disease noted. The rest of the paranasal sinuses, mastoid air cells, middle ears and ear canals are clear. The globes are intact.  There are postoperative changes to the right globe.                             X-Ray Chest 1 View (Final result)  Result time 03/23/18 22:02:17    Final result by Kevyn De La Cruz MD (03/23/18 22:02:17)                 Impression:          1.  Moderate root interstitial changes in the lung bases with cardiac silhouette size enlarged.  Findings are concerning for interstitial infectious process or interstitial pulmonary edema.  Clinical correlation is advised.  2.  Incidental findings as noted above.  Negative for acute process otherwise.      Electronically signed by: KEVYN DE LA CRUZ MD  Date:     03/23/18  Time:    22:02              Narrative:    Portable Chest x-ray    Clinical Indication: Transient alteration of awareness.  Patient is disoriented    Findings:     No comparison studies are available.  Moderate reticular interstitial changes to the lung bases with possible vascular congestion.  The lungs are free of alveolar opacities. The cardiac silhouette size is enlarged. The trachea is midline and the mediastinal width is normal. Negative for effusion or pneumothorax. Pulmonary vasculature is congested. Negative for osseous abnormalities. Convex left curvature of the thoracic spine. There are calcifications of the aortic knob and tortuosity of the descending thoracic aorta. There are degenerative changes of spine and both shoulder girdles. There are changes of a prior median sternotomy and CABG/prosthetic valve changes.  Postoperative changes in the right axilla.                             US Lower Extremity Veins Bilateral (Final result)  Result time 03/23/18  22:09:31    Final result by Kevyn De La Cruz MD (03/23/18 22:09:31)                 Impression:           1.  No evidence of right or left deep venous thrombosis.      Electronically signed by: KEVYN DE LA CRUZ MD  Date:     03/23/18  Time:    22:09              Narrative:    Bilateral lower extremity DVT ultrasound:    History: Pain and right leg.  Pain in left leg..  Lower extremity swelling.    Technique: Real-time, color, and duplex evaluation of the deep venous vessels in both lower extremities were performed.    Findings:   No comparison studies are available.  No evidence of deep venous thrombosis in either lower extremity. The deep venous vessels demonstrate normal spontaneous and augmented flow with normal respiratory variation.  Deep venous vessels compress in a normal fashion throughout.

## 2018-03-26 NOTE — SUBJECTIVE & OBJECTIVE
Past Medical History:   Diagnosis Date    Anemia     CAD (coronary artery disease)     Chronic back pain     Fibromyalgia     H/O aortic valve replacement 2010    History of breast cancer 1994    Right Lumpectomy    Hyperlipemia     Hypothyroid     Osteoarthritis     PVD (peripheral vascular disease)     Renal failure     Rheumatoid arteritis     S/P CABG (coronary artery bypass graft) 91,10    Syncope and collapse 07/2016    Vitamin D deficiency        Past Surgical History:   Procedure Laterality Date    ANKLE FRACTURE SURGERY      right foot    AORTIC VALVE REPLACEMENT  2010    APPENDECTOMY      APPENDECTOMY      BREAST LUMPECTOMY Right 1994    CORONARY ARTERY BYPASS GRAFT      HYSTERECTOMY      TONSILLECTOMY         Review of patient's allergies indicates:   Allergen Reactions    Codeine      Does not remember side effects    Corticosteroids (glucocorticoids)     Diovan hct [valsartan-hydrochlorothiazide]      May drowsy    Hydrocodone      Itching, nausea and vomiting.    Indomethacin     Iodine and iodide containing products      Rash and swelling    Lortab [hydrocodone-acetaminophen]     Omnicef [cefdinir]      Rash/itching    Oxycodone      Itching    Penicillins      Vaginal yeast infection     Current Facility-Administered Medications   Medication Frequency    amLODIPine tablet 5 mg Daily    apixaban tablet 2.5 mg BID    atorvastatin tablet 40 mg Daily    calcitRIOL capsule 0.25 mcg Daily    carvedilol tablet 25 mg Daily    cefTRIAXone (ROCEPHIN) 1 g in dextrose 5 % 50 mL IVPB Q24H    diphenhydrAMINE injection 12.5 mg Q6H PRN    hydrALAZINE tablet 25 mg Q12H    ipratropium 0.02 % nebulizer solution 0.5 mg Q8H    levothyroxine tablet 75 mcg Daily    ondansetron injection 4 mg Q8H PRN    polyethylene glycol packet 17 g Daily    sodium chloride 0.9% flush 3 mL Q8H     Family History     None        Social History Main Topics    Smoking status: Never Smoker     Smokeless tobacco: Never Used    Alcohol use No    Drug use: No    Sexual activity: Not Currently     Review of Systems   Constitutional: Negative.    HENT: Negative.    Respiratory: Positive for shortness of breath.    Cardiovascular: Negative.    Genitourinary: Negative.    Musculoskeletal: Negative.    Neurological: Negative.    Psychiatric/Behavioral: Negative.      Objective:     Vital Signs (Most Recent):  Temp: 98.1 °F (36.7 °C) (03/26/18 1504)  Pulse: 80 (03/26/18 1504)  Resp: 18 (03/26/18 1504)  BP: 102/60 (03/26/18 1504)  SpO2: 97 % (03/26/18 1504)  O2 Device (Oxygen Therapy): nasal cannula (03/26/18 1132) Vital Signs (24h Range):  Temp:  [97.1 °F (36.2 °C)-98.7 °F (37.1 °C)] 98.1 °F (36.7 °C)  Pulse:  [80-89] 80  Resp:  [17-20] 18  SpO2:  [95 %-99 %] 97 %  BP: (102-141)/(52-67) 102/60     Weight: 76.6 kg (168 lb 14 oz) (03/26/18 0544)  Body mass index is 30.89 kg/m².  Body surface area is 1.83 meters squared.    I/O last 3 completed shifts:  In: 50 [IV Piggyback:50]  Out: -     Physical Exam   Constitutional: She appears well-developed and well-nourished.   HENT:   Head: Normocephalic and atraumatic.   Neck: No JVD present.   Cardiovascular: Exam reveals no friction rub.    Irregular   Pulmonary/Chest: Effort normal and breath sounds normal. No respiratory distress. She has no rales.   Abdominal: Soft. Bowel sounds are normal.   Musculoskeletal: She exhibits no edema.   Neurological: She is alert.   Skin: Skin is dry.   Psychiatric: She has a normal mood and affect.   Nursing note and vitals reviewed.      Significant Labs: reviewed  BMP  Lab Results   Component Value Date     03/26/2018    K 4.5 03/26/2018     03/26/2018    CO2 25 03/26/2018    BUN 43 (H) 03/26/2018    CREATININE 2.9 (H) 03/26/2018    CALCIUM 9.3 03/26/2018    ANIONGAP 11 03/26/2018    ESTGFRAFRICA 17 (A) 03/26/2018    EGFRNONAA 15 (A) 03/26/2018     Lab Results   Component Value Date    WBC 10.01 03/26/2018    HGB 9.0 (L)  03/26/2018    HCT 29.3 (L) 03/26/2018    MCV 85 03/26/2018     03/26/2018       Significant Imaging: reviewed

## 2018-03-26 NOTE — ASSESSMENT & PLAN NOTE
Patient baseline creatinine 1.9, today 2.9  Lasix IV has been discontinued  Nephrology Consulted

## 2018-03-26 NOTE — ASSESSMENT & PLAN NOTE
- CXR this AM, Decreased lung volumes with probable bibasilar atelectasis.  Chronic interstitial coarsening or fibrosis is present as well.  - no complaints of SOB, Lasix IV on hold due to EDELMIRA.  -

## 2018-03-26 NOTE — PT/OT/SLP PROGRESS
Occupational Therapy      Patient Name:  Anu Blanco   MRN:  [id number]    S: pt cooperative with therapy session  O: pt performed 1 set x 15 reps b ue rom exercise in all available planes and ranges   A: pt pt displays improvements with b ue strength /endurance as evidence by complting hep  P;continue with poc    Marietta Melendez OT  3/26/2018   [address] [place]  [phone]

## 2018-03-26 NOTE — ASSESSMENT & PLAN NOTE
- Cardiology following  - Recommending Eliquis 2.5 mg PO BID for CVA prophylaxis  - Rate controlled  - Continue BB    Age 2 - 75 years old or older   CHF History 0 - no   HTN History 1 - yes   Stroke/TIA/Thromboembolism History 0 - no   Vascular Disease History 1 - yes   Diabetes Mellitus in history 0 - no   Female 1 - yes   [id number] VASc Scale Total Score 5

## 2018-03-26 NOTE — PLAN OF CARE
Problem: Physical Therapy Goal  Goal: Physical Therapy Goal  LTG's for PT by 4/1/18  1. Patient will perform supine to/from sit sba  2. Patient will perform sit to stand with RW and cga or less  3. Patient will amb x 25ft RW cga for bathroom distances at home   Outcome: Ongoing (interventions implemented as appropriate)  PT GT TRAINED 2X8' WITH RW AND MIN A.

## 2018-03-26 NOTE — ASSESSMENT & PLAN NOTE
Cardiology following  Likely secondary to demand ischemia in setting of rapid AF  Cont asa, statin, bb  Cardiology recommend oupt stress test or follow up with cardiologist

## 2018-03-26 NOTE — PT/OT/SLP PROGRESS
Physical Therapy  Treatment    Anu Blanco   MRN: [id number]   Admitting Diagnosis: Atrial fibrillation, new onset    PT Received On: 03/26/18  PT Start Time: 0925     PT Stop Time: 0950    PT Total Time (min): 25 min       Billable Minutes:  Gait Training 15 and Therapeutic Exercise 10    Treatment Type: Treatment  PT/PTA: PT             General Precautions: Standard, fall  Orthopedic Precautions: N/A   Braces: N/A         Subjective:  Communicated with NURSE AND EPIC CHART REVIEW  prior to session.   PT AGREED TO TX     Pain/Comfort  Pain Rating 1: 3/10  Pain Rating Post-Intervention 1: 3/10    Objective:   Patient found with: oxygen    Functional Mobility:  PT SUP>SIT EOB WITH CGA. PT STOOD WITH RW AND MIN A X 4 TRIALS. PT GT TRAINED 2X8' WITH RW AND MIN A. PT TO BATHROOM FOR TOILETING WITH MOD A ON /OFF COMMODE. PT RETURNED TO RM T/F TO CHAIR WITH MIN A. PT COMPLETED SEATED TE 2X10 REPS FOR STRENGTHENING. PT LEFT SEATED IN CHAIR WITH ALL NEEDS MET.     AM-PAC 6 CLICK MOBILITY  How much help from another person does this patient currently need?   1 = Unable, Total/Dependent Assistance  2 = A lot, Maximum/Moderate Assistance  3 = A little, Minimum/Contact Guard/Supervision  4 = None, Modified [place]/Independent    Turning over in bed (including adjusting bedclothes, sheets and blankets)?: 3  Sitting down on and standing up from a chair with arms (e.g., wheelchair, bedside commode, etc.): 3  Moving from lying on back to sitting on the side of the bed?: 3  Moving to and from a bed to a chair (including a wheelchair)?: 2  Need to walk in hospital room?: 2  Climbing 3-5 steps with a railing?: 1  Total Score: 14    AM-PAC Raw Score CMS G-Code Modifier Level of Impairment Assistance   6 % Total / Unable   7 - 9 CM 80 - 100% Maximal Assist   10 - 14 CL 60 - 80% Moderate Assist   15 - 19 CK 40 - 60% Moderate Assist   20 - 22 CJ 20 - 40% Minimal Assist   23 CI 1-20% SBA / CGA   24 CH 0% Independent/ Mod I      Patient left up in chair with call button in reach.    Assessment:  PT PROGRESSING WITH FUNC MOBILITY     Rehab identified problem list/impairments: Rehab identified problem list/impairments: impaired endurance, impaired functional mobilty, weakness, impaired balance, gait instability, pain, decreased lower extremity function, impaired self care skills, decreased ROM, decreased safety awareness    Rehab potential is GOOD    Activity tolerance: Fair    Discharge recommendations: Discharge Facility/Level Of Care Needs: nursing facility, skilled     Barriers to discharge:      Equipment recommendations:       GOALS:    Physical Therapy Goals        Problem: Physical Therapy Goal    Goal Priority Disciplines Outcome Goal Variances Interventions   Physical Therapy Goal     PT/OT, PT Ongoing (interventions implemented as appropriate)     Description:  LTG's for PT by 4/1/18  1. Patient will perform supine to/from sit sba  2. Patient will perform sit to stand with RW and cga or less  3. Patient will amb x 25ft RW cga for bathroom distances at home                    PLAN:    Patient to be seen 5 x/week  to address the above listed problems via gait training, therapeutic activities, therapeutic exercises  Plan of Care expires: 04/01/18  Plan of Care reviewed with: patient         Sabina Rah, PT  03/26/2018

## 2018-03-26 NOTE — HPI
Pt was seen and examined. H/o and chart reviewed. Pt is a 79 y/o female who was presented with SOB to ER. Pt was in new onset AF. She had some LE edema and received IV lasix. Since then s Cr has worsened. Any LE swelling is not impressive on exam. Pt herself is not a good historian, but has no c/o's, no SOB, no CP, no discomfort. Noted BP is low. Noted pt on multiple BP meds. Noted corrected Ca for alb is slightly elevated, pt is receiving calcitriol.

## 2018-03-26 NOTE — PLAN OF CARE
Problem: Patient Care Overview  Goal: Plan of Care Review  Outcome: Ongoing (interventions implemented as appropriate)  Patient awake alert and oriented to situation and self   Sinus rhythm on the monitor   Vital signs stable   No c/o pain or discomfort   Tolerated all scheduled care   Free of falls on current shift   Will continue to monitor for any sign of vital decline

## 2018-03-26 NOTE — PLAN OF CARE
Problem: Patient Care Overview  Goal: Plan of Care Review  Outcome: Ongoing (interventions implemented as appropriate)  Pt remained afebrile throughout this shift.   IV abx administered per order.   Pt remained free of falls this shift.   Pt had no c/o pain this shift.  Plan of care reviewed. Patient verbalizes understanding.   Pt moving/turing independently with encouragement.   Patient A-Fib on monitor.   Bed low, side rails up x 2, wheels locked, call light in reach.   Patient instructed to call for assistance.   Hourly rounding completed.   Will continue to monitor.

## 2018-03-26 NOTE — PROGRESS NOTES
[facility] Hospital Medicine  Progress Note    Patient Name: Anu Blanco  MRN: [id number]  Patient Class: IP- Inpatient   Admission Date: 3/23/2018  Length of Stay: 2 days  Attending Physician: Alda Hanson MD  Primary Care Provider: Serena Chu MD        Subjective:     Principal Problem:Atrial fibrillation, new onset    HPI:  Anu Blanco is a 78 y.o. female patient  presents for compliant of bilateral leg pain/swelling. Onset few days ago and continues to worsen. Reportedly swelling to her left leg normal, but her right leg has been swelling which has made if difficulty for pt to walk per daughter at bedside. Pt's family deny a hx of DVT or PE. Pt was also reportedly tested for dementia, reports negative per daughter. Walking exacerbates. Associated symptoms: worsening confusion. The patient was evaluated in the Er. Patient presented to Er in A-fib with no history. Over ER course ventricular rate worsened (max of 160s), improved and rate better controlled 100-110s post cardizem push given in Er. Hospital medicine was consulted for admission. CBC, CMP stable with baseline reviewed in care everywhere. No known baseline for troponin, today 0.040, . TSH normal. Mg/Phos neg. CT head impression: NAF. Chest Xray impression: Moderate root interstitial changes in the lung bases with cardiac silhouette size enlarged. Findings are concerning for interstitial infectious process or interstitial pulmonary edema.  Clinical correlation is advised. Patient afebrile, no wbc, no cough. Patient placed in obs for new onset afib, acute pulmonary edema.       Hospital Course:  Anu Blanco is a 78 year old female admitted for new onset A-fib. Pt seen and examined. Pt currently in SR s/p cardizem 10 mg IVP x 1 in ED. A-fib may be secondary to UTI and/or pulmonary edema. Cardiology consulted -- high risk for CVA, requires anticoagulation but will continue ASA for now. Will continue to diurese,  pt reports SOB improved.     Age 2 - 75 years old or older   CHF History 0 - no   HTN History 1 - yes   Stroke/TIA/Thromboembolism History 0 - no   Vascular Disease History 1 - yes   Diabetes Mellitus in history 0 - no   Female 1 - yes   [id number] VASc Scale Total Score 5      Pt currently receiving IV Rocephin for UTI. Urine culture pending. Pt reports having dysuria and urinary urgency. Pt oriented to person and place currently. BBS are clear and equal to auscultation, no wheezing, rhonchi noted. Pt denies chest pain, SOB. Abdominal assessment unremarkable. Pt c/o mild dysuria. Will get PT/OT to evaluate and treat -- pt was seen by her PCP on 11/11/17 for follow up for a fall on 10/27/17. Home health was set up but PCP did mention she may need inpatient rehab if cannot be managed. Will continue to monitor    03/25/18:  No acute events overnight. Baseline creatinine around 1.9, currently 2.5. Pulmonary edema on CXR. Pt was initially given Lasix but then discontinued. Pt c/o mild SOB and has diminished breath sounds anteriorly. Will give one time dose of Lasix and repeat CXR in AM. Pt currently sitting up in chair at bedside. Pt has no complaints of chest pain. A-fib, rate controlled. OT recommending SNF placement. Awaiting PT recommendations. Discussed with patient about possibility of SNF placement and she has agreed to placement if needed. Continue IV Rocephin for UTI, culture pending.      03/26/2018: Patient seen and examined today. She is feeling well, denies chest pain or shortness of breath. Baseline creatinine 1.9, however today 2.9. Lasix IV has been discontinued. A Fib rate in the low     100's. Chest X rat this morning showed decreased lung volumes with increasing bibasilar atelectasis, will order [employer] spirometer. Nephrology consult for EDELMIRA.     Interval History: Patient seen and examined. No complaints of chest pain or shortness of breath. creatinine bumped to 2.9 this AM, baseline 1.9. Nephrology  Consulted.      Review of Systems   Constitutional: Positive for fatigue. Negative for chills and fever.   HENT: Negative for congestion, rhinorrhea and sinus pressure.    Respiratory: Negative for apnea, cough, choking, chest tightness, shortness of breath, wheezing and stridor.    Cardiovascular: Negative for chest pain, palpitations and leg swelling.   Gastrointestinal: Negative for abdominal distention, abdominal pain, diarrhea, nausea and vomiting.   Endocrine: Negative for cold intolerance and heat intolerance.   Genitourinary: Negative for difficulty urinating and hematuria.   Musculoskeletal: Negative for arthralgias and joint swelling.   Skin: Negative for color change, pallor and rash.   Neurological: Positive for weakness. Negative for dizziness, seizures, numbness and headaches.   Psychiatric/Behavioral: Negative for agitation. The patient is not nervous/anxious.      Objective:     Vital Signs (Most Recent):  Temp: 98 °F (36.7 °C) (03/26/18 0725)  Pulse: 89 (03/26/18 1132)  Resp: 17 (03/26/18 1132)  BP: 138/67 (03/26/18 1132)  SpO2: 99 % (03/26/18 1132) Vital Signs (24h Range):  Temp:  [97.1 °F (36.2 °C)-98.7 °F (37.1 °C)] 98 °F (36.7 °C)  Pulse:  [84-95] 89  Resp:  [17-20] 17  SpO2:  [95 %-99 %] 99 %  BP: (109-141)/(52-67) 138/67     Weight: 76.6 kg (168 lb 14 oz)  Body mass index is 30.89 kg/m².  No intake or output data in the 24 hours ending 03/26/18 1136   Physical Exam   Constitutional: No distress.   HENT:   Head: Normocephalic and atraumatic.   Eyes: Right eye exhibits no discharge. Left eye exhibits no discharge.   Neck: No JVD present.   Cardiovascular: An irregular rhythm present. Exam reveals no gallop and no friction rub.    No murmur heard.  Pulmonary/Chest: No respiratory distress. She has no wheezes. She has no rales. She exhibits no tenderness.   Abdominal: She exhibits no distension. There is no tenderness. There is no rebound and no guarding. No hernia.   Musculoskeletal: She exhibits  no edema or deformity.   Neurological: She is alert. She displays normal reflexes. No cranial nerve deficit or sensory deficit. Coordination normal.   Skin: Skin is warm and dry. Capillary refill takes less than 2 seconds. She is not diaphoretic.   Psychiatric:   Confusion present    Nursing note and vitals reviewed.      Significant Labs:   CBC:   Recent Labs  Lab 03/25/18 0512 03/26/18  0528   WBC 10.35 10.01   HGB 9.1* 9.0*   HCT 29.7* 29.3*   * 337     CMP:   Recent Labs  Lab 03/25/18 0512 03/26/18  0528    140   K 4.4 4.5    104   CO2 23 25    101   BUN 37* 43*   CREATININE 2.5* 2.9*   CALCIUM 9.2 9.3   ANIONGAP 10 11   EGFRNONAA 18* 15*       Significant Imaging:     Imaging Results          CT Head Without Contrast (Final result)  Result time 03/23/18 23:16:45    Final result by Kevyn De La Cruz MD (03/23/18 23:16:45)                 Impression:           1.  Negative for acute intracranial process. Negative for hemorrhage, or skull fracture.  2.  Cerebral atrophy, intracranial calcifications and small vessel ischemic changes noted.    3.  Stable findings as noted above.    All CT scans at this facility used dose modulation, iterative reconstruction, and/or weight based dosing when appropriate to reduce radiation dose to as low as reasonably achievable.      Electronically signed by: KEVYN DE LA CRUZ MD  Date:     03/23/18  Time:    23:16              Narrative:    Head CT scan without contrast    Clinical Indication: Confusion/delirium, altered LOC, unexplained .  Alteration of awareness    Findings:  Comparisons are made to 06/25/2016.   The ventricles are midline and the CSF spaces are prominent. The gray-white matter junction is well preserved. Negative for intracranial vascular abnormalities. Negative for mass, mass effect, cerebral edema, hemorrhage or abnormal fluid collections.  Intracranial calcifications and small vessel ischemic changes noted. There are falx  calcifications.    The skull and scalp are intact.  Mild right maxillary sinus disease noted. The rest of the paranasal sinuses, mastoid air cells, middle ears and ear canals are clear. The globes are intact.  There are postoperative changes to the right globe.                             X-Ray Chest 1 View (Final result)  Result time 03/23/18 22:02:17    Final result by Kevyn De La Cruz MD (03/23/18 22:02:17)                 Impression:          1.  Moderate root interstitial changes in the lung bases with cardiac silhouette size enlarged.  Findings are concerning for interstitial infectious process or interstitial pulmonary edema.  Clinical correlation is advised.  2.  Incidental findings as noted above.  Negative for acute process otherwise.      Electronically signed by: KEVYN DE LA CRUZ MD  Date:     03/23/18  Time:    22:02              Narrative:    Portable Chest x-ray    Clinical Indication: Transient alteration of awareness.  Patient is disoriented    Findings:     No comparison studies are available.  Moderate reticular interstitial changes to the lung bases with possible vascular congestion.  The lungs are free of alveolar opacities. The cardiac silhouette size is enlarged. The trachea is midline and the mediastinal width is normal. Negative for effusion or pneumothorax. Pulmonary vasculature is congested. Negative for osseous abnormalities. Convex left curvature of the thoracic spine. There are calcifications of the aortic knob and tortuosity of the descending thoracic aorta. There are degenerative changes of spine and both shoulder girdles. There are changes of a prior median sternotomy and CABG/prosthetic valve changes.  Postoperative changes in the right axilla.                             US Lower Extremity Veins Bilateral (Final result)  Result time 03/23/18 22:09:31    Final result by Kevyn De La Cruz MD (03/23/18 22:09:31)                 Impression:           1.  No evidence of right or left deep  venous thrombosis.      Electronically signed by: ALKA VELASQUEZ MD  Date:     03/23/18  Time:    22:09              Narrative:    Bilateral lower extremity DVT ultrasound:    History: Pain and right leg.  Pain in left leg..  Lower extremity swelling.    Technique: Real-time, color, and duplex evaluation of the deep venous vessels in both lower extremities were performed.    Findings:   No comparison studies are available.  No evidence of deep venous thrombosis in either lower extremity. The deep venous vessels demonstrate normal spontaneous and augmented flow with normal respiratory variation.  Deep venous vessels compress in a normal fashion throughout.                            Assessment/Plan:      * Atrial fibrillation, new onset    - Cardiology following  - Recommending Eliquis 2.5 mg PO BID for CVA prophylaxis  - Rate controlled  - Continue BB    Age 2 - 75 years old or older   CHF History 0 - no   HTN History 1 - yes   Stroke/TIA/Thromboembolism History 0 - no   Vascular Disease History 1 - yes   Diabetes Mellitus in history 0 - no   Female 1 - yes   [id number] VASc Scale Total Score 5             EDELMIRA (acute kidney injury)    Patient baseline creatinine 1.9, today 2.9  Lasix IV has been discontinued  Nephrology Consulted           Elevated troponin    Cardiology following  Likely secondary to demand ischemia in setting of rapid AF  Cont asa, statin, bb  Cardiology recommend oupt stress test or follow up with cardiologist          Acute cystitis without hematuria      Reviewed patient urine results. procal +, Crp+. Paitent with +WBC and Leukocyte. Rocephin started. Urine culture added. Consider diflucan post antx as patient complains of vaginal yeast infection with antx treatments.         Essential hypertension    - Stable  - Continue home meds  - Will continue to monitor        Hypothyroid    - TSH 1.468  - Continue Levothyroxine          CAD (coronary artery disease)    -Cardiology following   -Continue  Statin, amlodiine and BB          Acute pulmonary edema    - CXR this AM, Decreased lung volumes with probable bibasilar atelectasis.  Chronic interstitial coarsening or fibrosis is present as well.  - no complaints of SOB, Lasix IV on hold due to EDELMIRA.  -           VTE Risk Mitigation         Ordered     apixaban tablet 2.5 mg  2 times daily     Route:  Oral        03/25/18 1505     IP VTE HIGH RISK PATIENT  Once      03/24/18 0036              Cody Constantino NP  Department of Hospital Medicine   [facility]

## 2018-03-26 NOTE — PLAN OF CARE
CM spoke to [facility].  They can accept patient as a long term patient.  Patient was recently discharged from SNF. She does not have any SNF days available so would have to be ICF.  Noe had begun the paperwork for long term care during her stay.  [facility] will need a new 142    CM completed [employer] @[phone] and faxed Level I to [facility] @[phone]

## 2018-03-27 ENCOUNTER — TELEPHONE (OUTPATIENT)
Dept: NEPHROLOGY | Facility: CLINIC | Age: 78
End: 2018-03-27

## 2018-03-27 VITALS
BODY MASS INDEX: 31.64 KG/M2 | SYSTOLIC BLOOD PRESSURE: 129 MMHG | RESPIRATION RATE: 20 BRPM | TEMPERATURE: 99 F | HEART RATE: 78 BPM | DIASTOLIC BLOOD PRESSURE: 60 MMHG | OXYGEN SATURATION: 97 % | HEIGHT: 62 IN | WEIGHT: 171.94 LBS

## 2018-03-27 LAB
ANION GAP SERPL CALC-SCNC: 11 MMOL/L
BASOPHILS # BLD AUTO: 0.05 K/UL
BASOPHILS NFR BLD: 0.5 %
BUN SERPL-MCNC: 47 MG/DL
CALCIUM SERPL-MCNC: 9.2 MG/DL
CHLORIDE SERPL-SCNC: 103 MMOL/L
CO2 SERPL-SCNC: 24 MMOL/L
CREAT SERPL-MCNC: 2.4 MG/DL
DIFFERENTIAL METHOD: ABNORMAL
EOSINOPHIL # BLD AUTO: 0.3 K/UL
EOSINOPHIL NFR BLD: 2.9 %
ERYTHROCYTE [DISTWIDTH] IN BLOOD BY AUTOMATED COUNT: 18.1 %
EST. GFR  (AFRICAN AMERICAN): 21 ML/MIN/1.73 M^2
EST. GFR  (NON AFRICAN AMERICAN): 19 ML/MIN/1.73 M^2
GLUCOSE SERPL-MCNC: 97 MG/DL
HCT VFR BLD AUTO: 27.4 %
HGB BLD-MCNC: 8.3 G/DL
LYMPHOCYTES # BLD AUTO: 2 K/UL
LYMPHOCYTES NFR BLD: 18.3 %
MCH RBC QN AUTO: 26.1 PG
MCHC RBC AUTO-ENTMCNC: 30.3 G/DL
MCV RBC AUTO: 86 FL
MONOCYTES # BLD AUTO: 1.4 K/UL
MONOCYTES NFR BLD: 13.1 %
NEUTROPHILS # BLD AUTO: 7 K/UL
NEUTROPHILS NFR BLD: 65.3 %
PLATELET # BLD AUTO: 341 K/UL
PMV BLD AUTO: 11 FL
POTASSIUM SERPL-SCNC: 4.3 MMOL/L
RBC # BLD AUTO: 3.18 M/UL
SODIUM SERPL-SCNC: 138 MMOL/L
WBC # BLD AUTO: 10.73 K/UL

## 2018-03-27 PROCEDURE — 85025 COMPLETE CBC W/AUTO DIFF WBC: CPT

## 2018-03-27 PROCEDURE — 99233 SBSQ HOSP IP/OBS HIGH 50: CPT | Mod: ,,, | Performed by: INTERNAL MEDICINE

## 2018-03-27 PROCEDURE — 25000003 PHARM REV CODE 250: Performed by: NURSE PRACTITIONER

## 2018-03-27 PROCEDURE — 25000242 PHARM REV CODE 250 ALT 637 W/ HCPCS: Performed by: NURSE PRACTITIONER

## 2018-03-27 PROCEDURE — 27000221 HC OXYGEN, UP TO 24 HOURS

## 2018-03-27 PROCEDURE — 63600175 PHARM REV CODE 636 W HCPCS: Performed by: NURSE PRACTITIONER

## 2018-03-27 PROCEDURE — 80048 BASIC METABOLIC PNL TOTAL CA: CPT

## 2018-03-27 PROCEDURE — A4216 STERILE WATER/SALINE, 10 ML: HCPCS | Performed by: NURSE PRACTITIONER

## 2018-03-27 PROCEDURE — 94640 AIRWAY INHALATION TREATMENT: CPT

## 2018-03-27 PROCEDURE — 36415 COLL VENOUS BLD VENIPUNCTURE: CPT

## 2018-03-27 RX ORDER — NAPROXEN SODIUM 220 MG/1
81 TABLET, FILM COATED ORAL DAILY
Qty: 30 TABLET | Refills: 0 | COMMUNITY
Start: 2018-03-27 | End: 2019-03-27

## 2018-03-27 RX ORDER — NAPROXEN SODIUM 220 MG/1
81 TABLET, FILM COATED ORAL DAILY
Status: DISCONTINUED | OUTPATIENT
Start: 2018-03-27 | End: 2018-03-27 | Stop reason: HOSPADM

## 2018-03-27 RX ORDER — NAPROXEN SODIUM 220 MG/1
81 TABLET, FILM COATED ORAL DAILY
Qty: 30 TABLET | Refills: 0 | COMMUNITY
Start: 2018-03-27 | End: 2018-03-27 | Stop reason: HOSPADM

## 2018-03-27 RX ADMIN — CEFTRIAXONE 1 G: 1 INJECTION, SOLUTION INTRAVENOUS at 10:03

## 2018-03-27 RX ADMIN — IPRATROPIUM BROMIDE 0.5 MG: 0.5 SOLUTION RESPIRATORY (INHALATION) at 03:03

## 2018-03-27 RX ADMIN — POLYETHYLENE GLYCOL 3350 17 G: 17 POWDER, FOR SOLUTION ORAL at 10:03

## 2018-03-27 RX ADMIN — APIXABAN 2.5 MG: 2.5 TABLET, FILM COATED ORAL at 10:03

## 2018-03-27 RX ADMIN — IPRATROPIUM BROMIDE 0.5 MG: 0.5 SOLUTION RESPIRATORY (INHALATION) at 12:03

## 2018-03-27 RX ADMIN — CARVEDILOL 25 MG: 12.5 TABLET, FILM COATED ORAL at 10:03

## 2018-03-27 RX ADMIN — ATORVASTATIN CALCIUM 40 MG: 40 TABLET, FILM COATED ORAL at 10:03

## 2018-03-27 RX ADMIN — AMLODIPINE BESYLATE 5 MG: 5 TABLET ORAL at 10:03

## 2018-03-27 RX ADMIN — IPRATROPIUM BROMIDE 0.5 MG: 0.5 SOLUTION RESPIRATORY (INHALATION) at 07:03

## 2018-03-27 RX ADMIN — SODIUM CHLORIDE, PRESERVATIVE FREE 3 ML: 5 INJECTION INTRAVENOUS at 05:03

## 2018-03-27 RX ADMIN — LEVOTHYROXINE SODIUM 75 MCG: 75 TABLET ORAL at 05:03

## 2018-03-27 NOTE — NURSING
Contacted [facility] appt desk for Dr. Smiley, Dr. Smiley's nurse will contact [facility] to set an appt. Dr. Smiley is aware that the appt was not able to be made before discharge.    F/U appt made with Cardiology Dr. Sagastume for March 29th at 3:45p. Will communicate this with [facility] when I call for report.

## 2018-03-27 NOTE — DISCHARGE SUMMARY
[facility] Hospital Medicine  Discharge Summary      Patient Name: Anu Blanco  MRN: [id number]  Admission Date: 3/23/2018  Hospital Length of Stay: 3 days  Discharge Date and Time:  03/27/2018 3:37 PM  Attending Physician: Alda Hanson MD   Discharging Provider: Cody Constantino NP  Primary Care Provider: Serena Chu MD      HPI:   Anu Blanco is a 78 y.o. female patient  presents for compliant of bilateral leg pain/swelling. Onset few days ago and continues to worsen. Reportedly swelling to her left leg normal, but her right leg has been swelling which has made if difficulty for pt to walk per daughter at bedside. Pt's family deny a hx of DVT or PE. Pt was also reportedly tested for dementia, reports negative per daughter. Walking exacerbates. Associated symptoms: worsening confusion. The patient was evaluated in the Er. Patient presented to Er in A-fib with no history. Over ER course ventricular rate worsened (max of 160s), improved and rate better controlled 100-110s post cardizem push given in Er. Hospital medicine was consulted for admission. CBC, CMP stable with baseline reviewed in care everywhere. No known baseline for troponin, today 0.040, . TSH normal. Mg/Phos neg. CT head impression: NAF. Chest Xray impression: Moderate root interstitial changes in the lung bases with cardiac silhouette size enlarged. Findings are concerning for interstitial infectious process or interstitial pulmonary edema.  Clinical correlation is advised. Patient afebrile, no wbc, no cough. Patient placed in obs for new onset afib, acute pulmonary edema.       * No surgery found *      Hospital Course:   Anu Blanco is a 78 year old female admitted for new onset A-fib, and bilateral leg swelling. She has PMHx of CAD, s/p CABG/AVR, HLD, hypothyroidism, PVD, syncope and anemia. Pt currently in SR s/p cardizem 10 mg IVP x 1 in ED. A-fib may be secondary to UTI and/or pulmonary edema.  Cardiology consulted -- high risk for CVA with elevated CHADVAS score, cardiology recommend anticoagulation with Eliqis 2.5 mg BID and continue ASA 81 mg daily. Also recommend BB and Statin. She receivied IV Rocephin for UTI x 4 days. Multiple organisms isolated, none in predominance. Denies any dysuria or urgency. Pt oriented to person and place, however does have dementia. Denies chest pain, shortness of breath, palpitations, syncope or dizziness. Creatinine baseline around 1.9-2.0, however up to 2.9 on yesterday. Lasix IV discontinued.  Nephrology consult, recommendation noted. Creatinine down to 2.4 today. Patient will follow up with her cardiologist Dr Sagastmue after discharge and [facility] as well.  She will be discharged to [facility]. Patient was seen and examined, she is ready for discharged.        Consults:   Consults         Status Ordering Provider     Inpatient consult to Cardiology  Once     Provider:  Aman Adair MD    Completed CAMMIE PICKARD     Inpatient consult to Nephrology  Once     Provider:  James Smiley MD    Acknowledged WADE KERNS     Inpatient consult to Social Work  Once     Provider:  (Not yet assigned)    Completed CAMMIE PICKARD     IP consult to case management  Once     Provider:  (Not yet assigned)    Completed FRANCES PELAYO new Assessment & Plan notes have been filed under this hospital service since the last note was generated.  Service: Hospital Medicine    Final Active Diagnoses:    Diagnosis Date Noted POA    PRINCIPAL PROBLEM:  Atrial fibrillation, new onset [I48.91] 03/24/2018 Yes    EDELMIRA (acute kidney injury) [N17.9] 03/26/2018 Unknown    Acute cystitis without hematuria [N30.00] 03/24/2018 Yes    Elevated troponin [R74.8] 03/24/2018 Yes    Essential hypertension [I10] 07/18/2016 Yes    Hypothyroid [E03.9]  Yes    CAD (coronary artery disease) [I25.10]  Yes    Acute pulmonary edema [J81.0] 03/24/2018 Yes    Hypotension due to drugs  "[I95.2] 03/26/2018 Yes    Hypercalcemia [E83.52] 03/26/2018 Yes    Hyperlipemia [E78.5]  Yes      Problems Resolved During this Admission:    Diagnosis Date Noted Date Resolved POA       Discharged Condition: stable    Disposition: Skilled Nursing Facility    Follow Up:  Follow-up Information     Dino Sagastume MD. Schedule an appointment as soon as possible for a visit in 2 days.    Specialty:  Cardiology  Why:  hospital follow up   Contact information:  [address] [address] [phone]             James Smiley MD. Schedule an appointment as soon as possible for a visit in 2 days.    Specialty:  Nephrology  Why:  hospital follow up   Contact information:  [address]  [place] [address] [phone]             [facility].    Why:  ICF  Contact information:  [address] [address]               Patient Instructions:     OXYGEN FOR HOME USE   Order Specific Question Answer Comments   Liter Flow 2    Duration Continuous    Qualifying SpO2: 87    Testing done at: [facility]    Device home concentrator with portable unit    Height: 5' 2" (1.575 m)    Weight: 78 kg (171 lb 15.3 oz)    Does patient have medical equipment at home? rollator    Does patient have medical equipment at home? bath bench    Does patient have medical equipment at home? bedside commode    Alternative treatment measures have been tried or considered and deemed clinically ineffective. Yes      Activity as tolerated     Notify your health care provider if you experience any of the following:  persistent dizziness, light-headedness, or visual disturbances     Notify your health care provider if you experience any of the following:  difficulty breathing or increased cough         Significant Diagnostic Studies: Labs:   CMP   Recent Labs  Lab 03/26/18  0528 03/27/18  0419    138   K 4.5 4.3    103   CO2 25 24    97   BUN 43* 47*   CREATININE 2.9* 2.4*   CALCIUM " 9.3 9.2   ANIONGAP 11 11   ESTGFRAFRICA 17* 21*   EGFRNONAA 15* 19*    and CBC   Recent Labs  Lab 03/26/18  0528 03/27/18  0419   WBC 10.01 10.73   HGB 9.0* 8.3*   HCT 29.3* 27.4*    341       Pending Diagnostic Studies:     None         Medications:  Reconciled Home Medications:      Medication List      START taking these medications    apixaban 2.5 mg Tab  Take 1 tablet (2.5 mg total) by mouth 2 (two) times daily.     aspirin 81 MG Chew  Take 1 tablet (81 mg total) by mouth once daily.        CONTINUE taking these medications    amLODIPine 5 MG tablet  Commonly known as:  NORVASC     atorvastatin 40 MG tablet  Commonly known as:  LIPITOR  TAKE ONE TABLET BY MOUTH ONCE DAILY     carvedilol 25 MG tablet  Commonly known as:  COREG     levothyroxine 75 MCG tablet  Commonly known as:  SYNTHROID  Take 1 tablet (75 mcg total) by mouth once daily.     VITAMIN D2 ORAL        STOP taking these medications    calcitRIOL 0.25 MCG Cap  Commonly known as:  ROCALTROL     hydrALAZINE 25 MG tablet  Commonly known as:  APRESOLINE     NORCO  mg Tab  Generic drug:  hydrocodone-acetaminophen 10-325mg     TYLENOL ARTHRITIS ORAL     zolpidem 10 mg Tab  Commonly known as:  AMBIEN           Where to Get Your Medications      These medications were sent to [address] [facility]  [address] [facility] [address]    Phone:  [phone]   · apixaban 2.5 mg Tab     You can get these medications from any pharmacy    You don't need a prescription for these medications  · aspirin 81 MG Chew         Indwelling Lines/Drains at time of discharge:   Lines/Drains/Airways          No matching active lines, drains, or airways          Time spent on the discharge of patient: 45 minutes  Patient was seen and examined on the date of discharge and determined to be suitable for discharge.         Cody Constantino NP  Department of Hospital Medicine  [facility]

## 2018-03-27 NOTE — TELEPHONE ENCOUNTER
----- Message from Brit Camacho RN sent at 3/27/2018  2:35 PM CDT -----  Patient needs a 2 day hospital follow up.  Discharge today.  Patient will discharge to [facility].  Please call [facility] with appointment date/time

## 2018-03-27 NOTE — TELEPHONE ENCOUNTER
Spoke with Court from [facility] and she states they will call back to schedule an appointment once patient arrives to their facility.

## 2018-03-27 NOTE — ASSESSMENT & PLAN NOTE
77 y/o female with EDELMIRA after treatment for AF and LEv edema:         EDELMIRA (acute kidney injury)     Renal: EDELMIRA.  Likely prerenal azotemia due to low BP/hypotension and decreased renal perfusion from AF  Has mild hypercalcemia due to calcitriol, contributes to EDELMIRA  Hypotension  On multiple BP meds  Suggest reduce hydralazine and amlodipine  Minimize/hold diuretics, no significant fluid gain.             * Atrial fibrillation, new onset     Cardiac: presented with AF.  Still in AF  Remains hypotensive  H/o of CAD  May have diastolic CHF (bilateral ankle swelling), though echo did not indicate.                        Plans and recommendations:  As discussed above  D/c hydralazine  D/c calcitriol  Send urine and Cr  Hold d/c for reason for persistent hypotension, still in AF, new EDELMIRA, hypercalcemia.  Will need reevaluation after above changes in am.  Total time spent 70 minutes including time needed to review the records, the   patient evaluation, documentation, face-to-face discussion with the patient,   more than 50% of the time was spent on coordination of care and counseling.    Level V visit.

## 2018-03-27 NOTE — PLAN OF CARE
BENEDICT spoke to Evelyn with [facility].  Patient is accepted and Evelyn will send transportation now.  CM provided number for report to [employer] (holding phone for [employer]).  Packet provided as well.  BENEDICT sent a message via [employer] to Dr Smiley's staff for follow up appointment.  Patient will transfer to [facility] ICF level    @1518 CM spoke to Isis, daughter to notify of transfer to [facility].  Isis requested CM contact her brother, Yang.  CM spoke to Yang to [phone] to notify of discharge to [facility] today.     03/27/18 1514   Final Note   Assessment Type Final Discharge Note   Discharge Disposition [facility]   Right Care Referral Info   Post Acute Recommendation Other   Facility Name [facility]

## 2018-03-27 NOTE — PLAN OF CARE
BENEDICT contacted Court with [facility] @[phone] to notify of discharge orders.  Court will speak to DON and call CM back on discharge paperwork is reviewed.  Discharge paperwork sent to [facility] via [facility].  CM awaiting a call back    @[phone] CM left a message for patient's daughter to return call.  CM went to bedside and updated patient on discharge to [facility] I[facility] level

## 2018-03-27 NOTE — SUBJECTIVE & OBJECTIVE
Interval History: Pt was seen and examined. No new events, stable last pm. Pt has no new c/o's, no SOB.    Review of patient's allergies indicates:   Allergen Reactions    Codeine      Does not remember side effects    Corticosteroids (glucocorticoids)     Diovan hct [valsartan-hydrochlorothiazide]      May drowsy    Hydrocodone      Itching, nausea and vomiting.    Indomethacin     Iodine and iodide containing products      Rash and swelling    Lortab [hydrocodone-acetaminophen]     Omnicef [cefdinir]      Rash/itching    Oxycodone      Itching    Penicillins      Vaginal yeast infection     Current Facility-Administered Medications   Medication Frequency    amLODIPine tablet 5 mg Daily    apixaban tablet 2.5 mg BID    atorvastatin tablet 40 mg Daily    carvedilol tablet 25 mg Daily    cefTRIAXone (ROCEPHIN) 1 g in dextrose 5 % 50 mL IVPB Q24H    diphenhydrAMINE injection 12.5 mg Q6H PRN    ipratropium 0.02 % nebulizer solution 0.5 mg Q8H    levothyroxine tablet 75 mcg Daily    ondansetron injection 4 mg Q8H PRN    polyethylene glycol packet 17 g Daily    sodium chloride 0.9% flush 3 mL Q8H       Objective:     Vital Signs (Most Recent):  Temp: 97.4 °F (36.3 °C) (03/27/18 0715)  Pulse: 84 (03/27/18 0759)  Resp: 20 (03/27/18 0759)  BP: (!) 125/57 (03/27/18 0715)  SpO2: 98 % (03/27/18 0759)  O2 Device (Oxygen Therapy): nasal cannula (03/27/18 0759) Vital Signs (24h Range):  Temp:  [97 °F (36.1 °C)-98.2 °F (36.8 °C)] 97.4 °F (36.3 °C)  Pulse:  [67-89] 84  Resp:  [16-20] 20  SpO2:  [97 %-100 %] 98 %  BP: (102-138)/(54-67) 125/57     Weight: 78 kg (171 lb 15.3 oz) (03/27/18 0537)  Body mass index is 31.45 kg/m².  Body surface area is 1.85 meters squared.    I/O last 3 completed shifts:  In: 410 [P.O.:360; IV Piggyback:50]  Out: -     Physical Exam   Constitutional: She appears well-developed and well-nourished.   HENT:   Head: Normocephalic and atraumatic.   Neck: No JVD present.   Cardiovascular:  Exam reveals no friction rub.    Irregular   Pulmonary/Chest: Effort normal and breath sounds normal. No respiratory distress. She has no rales.   Abdominal: Soft. Bowel sounds are normal.   Musculoskeletal: She exhibits no edema.   Neurological: She is alert.   Skin: Skin is dry.   Psychiatric: She has a normal mood and affect.   Nursing note and vitals reviewed.      Significant Labs: reviewed  BMP  Lab Results   Component Value Date     03/27/2018    K 4.3 03/27/2018     03/27/2018    CO2 24 03/27/2018    BUN 47 (H) 03/27/2018    CREATININE 2.4 (H) 03/27/2018    CALCIUM 9.2 03/27/2018    ANIONGAP 11 03/27/2018    ESTGFRAFRICA 21 (A) 03/27/2018    EGFRNONAA 19 (A) 03/27/2018     Lab Results   Component Value Date    WBC 10.73 03/27/2018    HGB 8.3 (L) 03/27/2018    HCT 27.4 (L) 03/27/2018    MCV 86 03/27/2018     03/27/2018         Significant Imaging: Reviewed

## 2018-03-27 NOTE — NURSING
Report called to Demtri LPN at [facility]. Transportation in route.   Charanjit verbalized understanding of f/u appt made with cardiologist at [facility] for 03/29/18 for 3:45P,     Cahranjit stated that Dr. Smiley's office had already contacted [facility] and setup f/u appt.    Heart monitor returned to monitor tech

## 2018-03-27 NOTE — PROGRESS NOTES
Home Oxygen Evaluation    Date Performed: 3/27/2018    1) Patient's Home O2 Sat on room air, while at rest:  87%        If O2 sats on room air at rest are 88% or below, patient qualifies. No additional testing needed. Document N/A in steps 2 and 3. If 89% or above, complete steps 2.      2) Patient's O2 Sat on room air while exercising:         If O2 sats on room air while exercising remain 89% or above patient does not qualify, no further testing needed Document N/A in step 3. If O2 sats on room air while exercising are 88% or below, continue to step 3.      3) Patient's O2 Sat while exercising on O2:  at  LPM         (Must show improvement from #2 for patients to qualify)    If O2 sats improve on oxygen, patient qualifies for portable oxygen. If not, the patient does not qualify.

## 2018-04-02 NOTE — PHYSICIAN QUERY
PT Name: Anu Blanco  MR #: [id number]    Physician Query Form - Atrial Fibrillation Specificity     CDS/: Cristal Pastor               Contact information: [email or web address]       This form is a permanent document in the medical record.     Query Date: April 2, 2018    By submitting this query, we are merely seeking further clarification of documentation. Please utilize your independent clinical judgment when addressing the question(s) below.    The medical record contains the following:   Indicators     Supporting Clinical Findings Location in Medical Record   x Atrial Fibrillation * Atrial fibrillation, new onset    - Cardiology following  - Recommending Eliquis 2.5 mg PO BID for CVA prophylaxis  - Rate controlled  - Continue BB   Progress note 3/26   x EKG results Atrial fibrillation with rapid ventricular response  Right bundle branch block  T wave abnormality, consider inferior ischemia  Abnormal ECG EKG 3/24   x Medication diltiaZEM injection 10 mg  Dose: 10 mg  Freq: ED 1 Time Route: IV  Start: 03/24/18 0045 End: 03/24/18 0057    metoprolol tartrate (LOPRESSOR) split tablet 12.5 mg  Dose: 12.5 mg  Freq: Once Route: Oral  Start: 03/24/18 0215 End: 03/24/18 0122    apixaban tablet 2.5 mg  Dose: 2.5 mg  Freq: 2 times daily Route: Oral  Start: 03/25/18 2100 End: 03/27/18 1832 MAR           MAR           MAR     Treatment      Other         Provider, please further specify the Atrial Fibrillation diagnosis.    [  ] Chronic  [x  ] Paroxysmal  [  ] Permanent  [  ] Persistent  [  ] Other (please specify): ____________________________  [  ] Clinically Undetermined    Please document in your progress notes daily for the duration of treatment until resolved, and include in your discharge summary.

## 2018-04-02 NOTE — PHYSICIAN QUERY
"PT Name: Anu Blanco  MR #: [id number]    Physician Query Form - Hematology Clarification      CDS/: Cristal Pastor               Contact information: [email or web address]      This form is a permanent document in the medical record.      Query Date: April 2, 2018    By submitting this query, we are merely seeking further clarification of documentation. Please utilize your independent clinical judgment when addressing the question(s) below.    The Medical record contains the following:   Indicators  Supporting Clinical Findings Location in Medical Record   X "Anemia" documented She has PMHx of CAD, s/p CABG/AVR, HLD, hypothyroidism, PVD, syncope and anemia Discharge summary    X H & H = 9.8 - 8.3  32.3 -27.4 Labs 3/27-3/23    BP =                     HR=      "GI bleeding" documented      Acute bleeding (Non GI site)      Transfusion(s)      Treatment:      Other:        Provider, please specify diagnosis or diagnoses associated with above clinical findings.        [*  ] Iron deficiency anemia  [  ] Chronic blood loss anemia  [  ] Pernicious anemia  [  ] Anemia of chronic disease ( Specify chronic disease)      [  ] Other (Specify) _______________________________     [  ] Clinically Undetermined     [  ] Other Hematological Diagnosis (please specify): _________________________________    [  ] Clinically Undetermined       Please document in your progress notes daily for the duration of treatment, until resolved, and include in your discharge summary.                                                                                                      "

## 2018-04-11 NOTE — PHYSICIAN QUERY
PT Name: Anu Blanco  MR #: [id number]     Physician Query Form - Documentation Clarification      CDS/: Cristal Pastor               Contact information: [email or web address]      This form is a permanent document in the medical record.     Query Date: April 11, 2018    By submitting this query, we are merely seeking further clarification of documentation. Please utilize your independent clinical judgment when addressing the question(s) below.    The Medical record reflects the following:    Supporting Clinical Findings Location in Medical Record     Elevated troponin    Likely secondary to demand ischemia in setting of rapid AF  Cont trend enzymes  Cont asa, statin, bb  Check 2D ECHO      Elevated troponin    Likely secondary to demand ischemia in setting of rapid AF  Cont asa, statin, bb  2D ECHO with WMA consistent with prior CABG/valve repair  Rec oupt stress test or follow up with cardiologist      Elevated troponin    Cardiology following  Likely secondary to demand ischemia in setting of rapid AF  Cont asa, statin, bb  Cardiology recommend oupt stress test or follow up with cardiologist     Cardiology Consult                 Cardiology progress note 3/25                     progress note 3/26                                                                            Doctor, Please specify diagnosis or diagnoses associated with above clinical findings.    Provider Use Only        [   x  ] Demand Ischemia ruled in   [     ] Demand ischemia ruled out   [     ] Other:__________________                                                                                                           [  ] Clinically undetermined

## 2018-05-03 ENCOUNTER — PATIENT OUTREACH (OUTPATIENT)
Dept: ADMINISTRATIVE | Facility: HOSPITAL | Age: 78
End: 2018-05-03

## 2018-05-03 NOTE — PROGRESS NOTES
Spoke with pt son, Faustino, [phone] re scheduling BMD f/u ankle fx. Mr. Harmon stated pt is now in nursing home, [facility], for long term care. SAJAN sherman.

## 2018-05-04 ENCOUNTER — LAB VISIT (OUTPATIENT)
Dept: LAB | Facility: HOSPITAL | Age: 78
End: 2018-05-04
Attending: INTERNAL MEDICINE
Payer: MEDICARE

## 2018-05-04 ENCOUNTER — OFFICE VISIT (OUTPATIENT)
Dept: NEPHROLOGY | Facility: CLINIC | Age: 78
End: 2018-05-04
Payer: MEDICARE

## 2018-05-04 VITALS — HEIGHT: 62 IN | HEART RATE: 68 BPM | SYSTOLIC BLOOD PRESSURE: 112 MMHG | DIASTOLIC BLOOD PRESSURE: 60 MMHG

## 2018-05-04 DIAGNOSIS — Z71.89 ENCOUNTER FOR MEDICATION REVIEW AND COUNSELING: ICD-10-CM

## 2018-05-04 DIAGNOSIS — R60.9 EDEMA, UNSPECIFIED TYPE: ICD-10-CM

## 2018-05-04 DIAGNOSIS — N17.9 ACUTE KIDNEY INJURY: ICD-10-CM

## 2018-05-04 DIAGNOSIS — R79.89 PRERENAL AZOTEMIA: ICD-10-CM

## 2018-05-04 DIAGNOSIS — N18.4 CHRONIC KIDNEY DISEASE, STAGE IV (SEVERE): ICD-10-CM

## 2018-05-04 DIAGNOSIS — I10 ESSENTIAL HYPERTENSION: ICD-10-CM

## 2018-05-04 DIAGNOSIS — N17.9 ACUTE KIDNEY INJURY: Primary | ICD-10-CM

## 2018-05-04 PROCEDURE — 99213 OFFICE O/P EST LOW 20 MIN: CPT | Mod: PBBFAC,PO | Performed by: INTERNAL MEDICINE

## 2018-05-04 PROCEDURE — 99215 OFFICE O/P EST HI 40 MIN: CPT | Mod: S$PBB,,, | Performed by: INTERNAL MEDICINE

## 2018-05-04 PROCEDURE — 85025 COMPLETE CBC W/AUTO DIFF WBC: CPT

## 2018-05-04 PROCEDURE — 99999 PR PBB SHADOW E&M-EST. PATIENT-LVL III: CPT | Mod: PBBFAC,,, | Performed by: INTERNAL MEDICINE

## 2018-05-04 PROCEDURE — 80048 BASIC METABOLIC PNL TOTAL CA: CPT

## 2018-05-04 PROCEDURE — 83970 ASSAY OF PARATHORMONE: CPT

## 2018-05-04 PROCEDURE — 36415 COLL VENOUS BLD VENIPUNCTURE: CPT | Mod: PO

## 2018-05-04 RX ORDER — FUROSEMIDE 40 MG/1
40 TABLET ORAL DAILY
Qty: 30 TABLET | Refills: 11 | Status: ON HOLD | OUTPATIENT
Start: 2018-05-04 | End: 2018-06-28 | Stop reason: HOSPADM

## 2018-05-04 NOTE — PROGRESS NOTES
NEPHROLOGY CONSULTATION NOTE    REASON FOR CONSULTATION:  Post-hospital followup after recent hospital   admission, the patient had acute kidney injury on chronic kidney disease.    REFERRING PHYSICIAN:  Alexis Ross, nurse practitioner, with Hospitalist team.    HISTORY OF PRESENT ILLNESS:  Thank you for referring Ms. Anu Blanco to us.    She is an 78-year-old female whom we had seen at [facility] for acute   kidney injury on chronic kidney disease.  The patient had been admitted for   cardiac reasons due to new-onset atrial fibrillation and lower extremity   swelling.  The patient was short of breath.  Her serum creatinine worsened from   baseline of about 2 mg/dL to about 2.9.  The cause of the acute kidney injury   was prerenal azotemia due to the cardiac issues.    The patient currently lives at [facility] and my recommendations   were given to the nurse aide who had accompanied the patient to the clinic and   will be sent back to the nursing [place].    The patient has no acute complaints today, no shortness of breath, but she says   that her leg swelling is worse.    MEDICATIONS:  Reviewed.  It is noted that diuretics that the patient was on in   the hospital have not been continued.  She has no dizziness, no lightheadedness.    No chest discomfort.    PAST MEDICAL HISTORY:  1.  CKD, stage IV, baseline creatinine appears to be close to 2 mg/dL.  2.  Coronary artery disease.  3.  Atrial fibrillation.  4.  Fibromyalgia.  5.  History of breast cancer with a right lumpectomy.  6.  Hyperlipidemia.  7.  Hypothyroidism.  8.  Osteoarthritis.  9.  Peripheral vascular disease.  10.  Rheumatoid arthritis.  11.  Vitamin D deficiency.    PAST SURGICAL HISTORY:  Reviewed.  Status post CABG in July 2016.    FAMILY HISTORY:  Reviewed.    ALLERGIES:  Reviewed, to multiple medications including codeine,   corticosteroids, Diovan, indomethacin, iodine as well as Lortab.  Also allergic   to Omnicef.    REVIEW  OF SYSTEMS:  Recent hospitalization as reviewed above.  GENERAL:  Negative.  HEAD, EYES, EARS, NOSE, THROAT:  Negative.  CARDIAC:  Negative.  PULMONARY:  Negative.  GASTROINTESTINAL:  Negative.  GENITOURINARY:  Negative.  PSYCHOLOGICAL:  Negative.  NEUROLOGICAL:  Negative.  ENDOCRINE:  Negative.  HEMATOLOGIC AND ONCOLOGIC:  Negative.  INFECTIOUS DISEASE:  Negative.  The rest of the review of systems negative.    PHYSICAL EXAMINATION:  VITAL SIGNS:  Blood pressure is 112/60, pulse is 68.    GENERAL:  She is on a wheelchair.  She is cooperative, pleasant.  Speech and   thought process appropriate, normal.  Atraumatic, normocephalic, well developed,   well nourished.  Mucous membranes moist.  NECK:  No JVD.  HEART:  Regular rate and rhythm, S1 and S2 audible.  CHEST:  Clear to auscultation.  No rales.  No wheezes.  Breathing symmetric,   unlabored.  ABDOMEN:  Soft, nontender.  EXTREMITIES:  Showed 2+ pitting edema bilaterally.    LABS:  Reviewed.  No new labs.  The last labs are from the day the patient was   discharged home from the hospital.  Creatinine was 2.4.  Sodium 138, potassium   4.3, chloride 103, bicarbonate 24, calcium was 9.2, white count 10.7, hemoglobin   8.3, platelets 341.    ASSESSMENT AND PLAN:  This is an 78-year-old female who presents for followup of   acute kidney injury on chronic kidney disease due to prerenal azotemia due to   cardiac reasons.  The impression is as follows:  1.  Renal.  The patient's acute kidney injury was stable and was resolving when   the patient was being discharged home about a month ago from [facility].    Labs will be repeated today and we will provide an update.  Her potassium was   previously normal.  The cause of the acute kidney injury was prerenal azotemia   due to essentially decreased renal perfusion due to the cardiac reasons.  The   patient had been admitted for a new-onset AFib.  2.  Lower extremity edema noted.  I had suspected the patient may have  diastolic   CHF; however echocardiography did not show any diastolic dysfunction and EF was   well preserved at 55%.  The date of the echo is 03/24/2018.  The cause of her   leg swelling is excess water intake, which the patient admits to on the   background of having CKD.  Advised the patient to reduce water intake, will also   restart Lasix.  3.  Hypertension.  Blood pressure is controlled to low.  Because Lasix is being   restarted, I will stop amlodipine.  4.  Cardiac:  The patient presented with a new-onset A-Fib, currently is stable   and appear to be in normal sinus rhythm in the clinic today.  We will defer   further management to the patient's cardiologist.    PLANS AND RECOMMENDATIONS:  1.  Advised the patient of above.  Opportunity for questions and discussion   provided.  2.  Medications were reviewed and discussed with the patient.  3.  Discontinue amlodipine, which patient is taking at the nursing home 5 mg   p.o. daily.  4.  Restart Lasix 40 mg p.o. daily.  5.  Continue Coreg 25 mg b.i.d. for the control of atrial fibrillation.  Return   to see us in about a month.  Labs were reordered today.  I will chart check and   update the records.    Total time spent 40 minutes.  Time was needed to review the records, the patient   evaluation, documentation, face-to-face discussion with the patient.  More than   50% of the time was spent on counseling and coordination of care.  Level V   visit.      SEBASTIAN  dd: 05/04/2018 13:24:54 (CDT)  td: 05/05/2018 01:56:37 (CDT)  Doc ID   #[id number]  Job ID #[id number]    CC: BEATRIZ BRISCOE MD    [id number]

## 2018-05-05 LAB
ANION GAP SERPL CALC-SCNC: 9 MMOL/L
BASOPHILS # BLD AUTO: 0.15 K/UL
BASOPHILS NFR BLD: 2.2 %
BUN SERPL-MCNC: 36 MG/DL
CALCIUM SERPL-MCNC: 9 MG/DL
CHLORIDE SERPL-SCNC: 109 MMOL/L
CO2 SERPL-SCNC: 21 MMOL/L
CREAT SERPL-MCNC: 2.1 MG/DL
DIFFERENTIAL METHOD: ABNORMAL
EOSINOPHIL # BLD AUTO: 0.7 K/UL
EOSINOPHIL NFR BLD: 10.7 %
ERYTHROCYTE [DISTWIDTH] IN BLOOD BY AUTOMATED COUNT: 17.8 %
EST. GFR  (AFRICAN AMERICAN): 25.1 ML/MIN/1.73 M^2
EST. GFR  (NON AFRICAN AMERICAN): 21.8 ML/MIN/1.73 M^2
GLUCOSE SERPL-MCNC: 92 MG/DL
HCT VFR BLD AUTO: 25.8 %
HGB BLD-MCNC: 7.2 G/DL
IMM GRANULOCYTES # BLD AUTO: 0.01 K/UL
IMM GRANULOCYTES NFR BLD AUTO: 0.1 %
LYMPHOCYTES # BLD AUTO: 1.4 K/UL
LYMPHOCYTES NFR BLD: 21.5 %
MCH RBC QN AUTO: 26.6 PG
MCHC RBC AUTO-ENTMCNC: 27.9 G/DL
MCV RBC AUTO: 95 FL
MONOCYTES # BLD AUTO: 0.8 K/UL
MONOCYTES NFR BLD: 12.2 %
NEUTROPHILS # BLD AUTO: 3.6 K/UL
NEUTROPHILS NFR BLD: 53.3 %
NRBC BLD-RTO: 0 /100 WBC
PLATELET # BLD AUTO: 251 K/UL
PMV BLD AUTO: 11.8 FL
POTASSIUM SERPL-SCNC: 4.9 MMOL/L
PTH-INTACT SERPL-MCNC: 163 PG/ML
RBC # BLD AUTO: 2.71 M/UL
SODIUM SERPL-SCNC: 139 MMOL/L
WBC # BLD AUTO: 6.71 K/UL

## 2018-06-01 ENCOUNTER — OFFICE VISIT (OUTPATIENT)
Dept: NEPHROLOGY | Facility: CLINIC | Age: 78
End: 2018-06-01
Payer: MEDICARE

## 2018-06-01 VITALS
BODY MASS INDEX: 27.59 KG/M2 | SYSTOLIC BLOOD PRESSURE: 142 MMHG | HEIGHT: 62 IN | HEART RATE: 66 BPM | WEIGHT: 149.94 LBS | DIASTOLIC BLOOD PRESSURE: 84 MMHG

## 2018-06-01 DIAGNOSIS — I10 ESSENTIAL HYPERTENSION: ICD-10-CM

## 2018-06-01 DIAGNOSIS — R60.9 EDEMA, UNSPECIFIED TYPE: ICD-10-CM

## 2018-06-01 DIAGNOSIS — Z71.89 ENCOUNTER FOR MEDICATION REVIEW AND COUNSELING: ICD-10-CM

## 2018-06-01 DIAGNOSIS — N18.4 CHRONIC KIDNEY DISEASE, STAGE IV (SEVERE): Primary | ICD-10-CM

## 2018-06-01 PROCEDURE — 99999 PR PBB SHADOW E&M-EST. PATIENT-LVL III: CPT | Mod: PBBFAC,,, | Performed by: INTERNAL MEDICINE

## 2018-06-01 PROCEDURE — 99213 OFFICE O/P EST LOW 20 MIN: CPT | Mod: PBBFAC,PO | Performed by: INTERNAL MEDICINE

## 2018-06-01 PROCEDURE — 99215 OFFICE O/P EST HI 40 MIN: CPT | Mod: S$PBB,,, | Performed by: INTERNAL MEDICINE

## 2018-06-01 NOTE — PROGRESS NOTES
NEPHROLOGY CLINIC FOLLOWUP NOTE    PRIMARY CARE PHYSICIAN:  Dr. Harleen Weeks.    RESIDENCE:  [facility] at [address], [place] [address].    REASON FOR FOLLOWUP AND CHIEF COMPLAINT:  History of chronic kidney disease.    HISTORY OF PRESENT ILLNESS:  Ms. Blanco is an 78-year-old  female who   presents for followup.  The patient has a history of CKD stage IV with often   serum creatinine fluctuations.  She was last seen by us about a month ago.    During that visit, her blood pressure was low and amlodipine was stopped.  She   had become fluid overloaded and Lasix was restarted.  She was drinking excessive   amounts of water and she was advised to lower water drinking.    She is here for followup.  She is feeling better today.  No acute issues, no new   problems, no chest pain, no shortness of breath.  Her leg swelling has   improved.  Her son is at the clinic with her today.    PAST MEDICAL HISTORY:  1.  CKD stage IV, baseline creatinine is 2 to 3 mg/dL, but often serum   creatinine fluctuates.  2.  Coronary artery disease.  3.  Atrial fibrillation.  4.  Fibromyalgia.  5.  History of breast cancer with right lumpectomy.  6.  Hyperlipidemia.  7.  Hypothyroidism.  8.  Osteoarthritis.  9.  Peripheral vascular disease.  10.  Rheumatoid arthritis.  11.  Vitamin D deficiency.    PAST SURGICAL HISTORY:  Reviewed, status post CABG in 07/2016.    FAMILY HISTORY:  Reviewed and unchanged.    ALLERGIES:  Reviewed, to multiple medications, corticosteroids, Diovan,   indomethacin, iodine and Lortab.    MEDICATIONS:  Reviewed.  Lasix 40 mg p.o. daily, aspirin 81 mg daily, Lipitor 40   mg daily, Coreg 25 mg b.i.d., Synthroid 75 mcg daily and ergocalciferol.    REVIEW OF SYSTEMS:  No recent hospitalizations.  GENERAL:  Negative.  HEAD, EYES, EARS, NOSE, AND THROAT:  Negative.  CARDIAC:  Negative.  PULMONARY:  Negative.  GASTROINTESTINAL:  Negative.  GENITOURINARY:   Negative.  PSYCHOLOGICAL:  Negative.  NEUROLOGICAL:  Negative.  ENDOCRINE:  Negative.  HEMATOLOGIC AND ONCOLOGIC:  Negative.  INFECTIOUS DISEASE:  Negative.  The rest of the review of systems negative.    PHYSICAL EXAMINATION:  VITAL SIGNS:  Blood pressure is 142/84, pulse is 66, weight is 149 pounds.  GENERAL:  She is accompanied by her son.  She is in no acute distress.  She is   cooperative, pleasant, in no acute distress.  Speech and thought process   appropriate, normal.  HEENT:  Mucous membranes moist.  NECK:  No JVD.  HEART:  Regular rate and rhythm, S1 and S2 audible.  No rubs.  CHEST:  Clear to auscultation.  No rales.  No wheezes.  Breathing symmetric,   unlabored.  ABDOMEN:  Soft, nontender.  EXTREMITIES:  Showed 1+ pitting edema bilaterally, improved compared to 2 to 3+   last visit.    LABS:  Reviewed.  These are labs from the nursing home.  Creatinine is 3.4,   sodium 141, potassium 4.3, chloride 103, bicarbonate 26, calcium 9.0.  White   count 7.3, hemoglobin 8.9, MCV is 88, platelets 276.    ASSESSMENT AND PLAN:  This 78-year-old female with CKD stage IV, who presents   for followup.  The impression is as follows:  1.  Renal.  The patient has fluctuating serum creatinine values.  Currently, her   creatinine is above her baseline.  She may be slightly over diuresed, on the   other hand her fluid status has markedly improved and she looks better, she   feels better and she is asymptomatic today except for some lower extremity   edema, therefore, I suggest keep the same dose of Lasix at 40 mg once a day.  2.  Complications of kidney disease were addressed.  The patient probably has   secondary hyperparathyroidism.  Intact PTH will be ordered.  3.  The patient has anemia, normocytic.  This is probably related to CKD.  She   will be referred to Heme/Onc for Procrit and IV iron therapy considerations.  4.  Cardiac.  The patient had previously an echocardiography done, which showed   normal results with no  evidence of diastolic or systolic CHF.  EF was 55%.  I   believe the cause of her lower extremity swelling is excess water intake on the   background of having CKD and decreased renal clearance of water intake.  Again,   discussed with the patient and her son in layman's language.  Advised fluid   restriction to moderation.    She does, however, have a history of paroxysmal AFib.    PLANS AND RECOMMENDATIONS:  As reviewed above for each individual problem.    Total time spent 40 minutes.  The time was needed to review the records, the   patient evaluation, documentation, face-to-face discussion with the patient.    Multiple issues addressed.  More than 50% of the time was spent on counseling   and coordination of care.  Level V visit.  Return to see us in about two to   three months.      AK/RICKY  dd: 06/01/2018 11:43:59 (CDT)  td: 06/02/2018 02:50:50 (CDT)  Doc ID   #[id number]  Job ID #[id number]    CC: Harleen Weeks M.D.    [id number]

## 2018-06-07 ENCOUNTER — TELEPHONE (OUTPATIENT)
Dept: HEMATOLOGY/ONCOLOGY | Facility: CLINIC | Age: 78
End: 2018-06-07

## 2018-06-12 ENCOUNTER — INITIAL CONSULT (OUTPATIENT)
Dept: HEMATOLOGY/ONCOLOGY | Facility: CLINIC | Age: 78
End: 2018-06-12
Payer: MEDICARE

## 2018-06-12 ENCOUNTER — LAB VISIT (OUTPATIENT)
Dept: LAB | Facility: HOSPITAL | Age: 78
End: 2018-06-12
Attending: NURSE PRACTITIONER
Payer: MEDICARE

## 2018-06-12 VITALS
HEIGHT: 62 IN | DIASTOLIC BLOOD PRESSURE: 57 MMHG | OXYGEN SATURATION: 94 % | HEART RATE: 65 BPM | TEMPERATURE: 99 F | SYSTOLIC BLOOD PRESSURE: 123 MMHG | BODY MASS INDEX: 28.71 KG/M2 | WEIGHT: 156 LBS

## 2018-06-12 DIAGNOSIS — Z95.1 S/P CABG (CORONARY ARTERY BYPASS GRAFT): ICD-10-CM

## 2018-06-12 DIAGNOSIS — I10 ESSENTIAL HYPERTENSION: Primary | ICD-10-CM

## 2018-06-12 DIAGNOSIS — N18.4 ANEMIA IN STAGE 4 CHRONIC KIDNEY DISEASE: ICD-10-CM

## 2018-06-12 DIAGNOSIS — Z85.3 HISTORY OF BREAST CANCER: ICD-10-CM

## 2018-06-12 DIAGNOSIS — D63.1 ANEMIA IN STAGE 4 CHRONIC KIDNEY DISEASE: ICD-10-CM

## 2018-06-12 PROBLEM — D64.9 ANEMIA: Status: ACTIVE | Noted: 2018-06-12

## 2018-06-12 LAB
ALBUMIN SERPL BCP-MCNC: 2.7 G/DL
ALP SERPL-CCNC: 81 U/L
ALT SERPL W/O P-5'-P-CCNC: 11 U/L
ANION GAP SERPL CALC-SCNC: 10 MMOL/L
AST SERPL-CCNC: 12 U/L
BASOPHILS # BLD AUTO: 0.05 K/UL
BASOPHILS NFR BLD: 0.7 %
BILIRUB SERPL-MCNC: 0.3 MG/DL
BUN SERPL-MCNC: 61 MG/DL
CALCIUM SERPL-MCNC: 8.7 MG/DL
CHLORIDE SERPL-SCNC: 103 MMOL/L
CO2 SERPL-SCNC: 25 MMOL/L
CREAT SERPL-MCNC: 3.8 MG/DL
DIFFERENTIAL METHOD: ABNORMAL
EOSINOPHIL # BLD AUTO: 0.7 K/UL
EOSINOPHIL NFR BLD: 9.9 %
ERYTHROCYTE [DISTWIDTH] IN BLOOD BY AUTOMATED COUNT: 16.5 %
EST. GFR  (AFRICAN AMERICAN): 12 ML/MIN/1.73 M^2
EST. GFR  (NON AFRICAN AMERICAN): 11 ML/MIN/1.73 M^2
FERRITIN SERPL-MCNC: 380 NG/ML
GLUCOSE SERPL-MCNC: 185 MG/DL
HAPTOGLOB SERPL-MCNC: 261 MG/DL
HCT VFR BLD AUTO: 26.6 %
HGB BLD-MCNC: 8.1 G/DL
IRON SERPL-MCNC: 24 UG/DL
LDH SERPL L TO P-CCNC: 224 U/L
LYMPHOCYTES # BLD AUTO: 1.5 K/UL
LYMPHOCYTES NFR BLD: 21.6 %
MCH RBC QN AUTO: 27.4 PG
MCHC RBC AUTO-ENTMCNC: 30.5 G/DL
MCV RBC AUTO: 90 FL
MONOCYTES # BLD AUTO: 0.6 K/UL
MONOCYTES NFR BLD: 8.1 %
NEUTROPHILS # BLD AUTO: 4.3 K/UL
NEUTROPHILS NFR BLD: 59.7 %
PLATELET # BLD AUTO: 251 K/UL
PMV BLD AUTO: 10.1 FL
POTASSIUM SERPL-SCNC: 4.2 MMOL/L
PROT SERPL-MCNC: 6.6 G/DL
RBC # BLD AUTO: 2.96 M/UL
SATURATED IRON: 12 %
SODIUM SERPL-SCNC: 138 MMOL/L
TOTAL IRON BINDING CAPACITY: 206 UG/DL
TRANSFERRIN SERPL-MCNC: 139 MG/DL
WBC # BLD AUTO: 7.14 K/UL

## 2018-06-12 PROCEDURE — 82728 ASSAY OF FERRITIN: CPT

## 2018-06-12 PROCEDURE — 84165 PROTEIN E-PHORESIS SERUM: CPT | Mod: 26,,, | Performed by: PATHOLOGY

## 2018-06-12 PROCEDURE — 86334 IMMUNOFIX E-PHORESIS SERUM: CPT | Mod: 26,,, | Performed by: PATHOLOGY

## 2018-06-12 PROCEDURE — 99999 PR PBB SHADOW E&M-EST. PATIENT-LVL III: CPT | Mod: PBBFAC,,, | Performed by: NURSE PRACTITIONER

## 2018-06-12 PROCEDURE — 84165 PROTEIN E-PHORESIS SERUM: CPT

## 2018-06-12 PROCEDURE — 99213 OFFICE O/P EST LOW 20 MIN: CPT | Mod: PBBFAC | Performed by: NURSE PRACTITIONER

## 2018-06-12 PROCEDURE — 99205 OFFICE O/P NEW HI 60 MIN: CPT | Mod: S$PBB,,, | Performed by: NURSE PRACTITIONER

## 2018-06-12 PROCEDURE — 36415 COLL VENOUS BLD VENIPUNCTURE: CPT

## 2018-06-12 PROCEDURE — 83520 IMMUNOASSAY QUANT NOS NONAB: CPT

## 2018-06-12 PROCEDURE — 85025 COMPLETE CBC W/AUTO DIFF WBC: CPT

## 2018-06-12 PROCEDURE — 82668 ASSAY OF ERYTHROPOIETIN: CPT

## 2018-06-12 PROCEDURE — 80053 COMPREHEN METABOLIC PANEL: CPT

## 2018-06-12 PROCEDURE — 86334 IMMUNOFIX E-PHORESIS SERUM: CPT

## 2018-06-12 PROCEDURE — 83615 LACTATE (LD) (LDH) ENZYME: CPT

## 2018-06-12 PROCEDURE — 83010 ASSAY OF HAPTOGLOBIN QUANT: CPT

## 2018-06-12 PROCEDURE — 83540 ASSAY OF IRON: CPT

## 2018-06-13 LAB
ALBUMIN SERPL ELPH-MCNC: 2.83 G/DL
ALPHA1 GLOB SERPL ELPH-MCNC: 0.44 G/DL
ALPHA2 GLOB SERPL ELPH-MCNC: 0.8 G/DL
B-GLOBULIN SERPL ELPH-MCNC: 0.69 G/DL
GAMMA GLOB SERPL ELPH-MCNC: 1.24 G/DL
KAPPA LC SER QL IA: 12.19 MG/DL
KAPPA LC/LAMBDA SER IA: 1.54
LAMBDA LC SER QL IA: 7.89 MG/DL
PROT SERPL-MCNC: 6 G/DL

## 2018-06-14 LAB
ERYTHROPOIETIN: 12.2 MIU/ML
INTERPRETATION SERPL IFE-IMP: NORMAL
PATHOLOGIST INTERPRETATION IFE: NORMAL
PATHOLOGIST INTERPRETATION SPE: NORMAL

## 2018-06-18 NOTE — PROGRESS NOTES
Subjective:       Patient ID: Anu Blanco is a 78 y.o. female.    Chief Complaint: Anemia and Results    78 year old female, presents to the clinic for evaluation of anemia. Patient was referred to by nephrology. A review of available records and history given by patient show a long-standing microcytic anemia that has been present since 2014. Other comorbid conditions affecting clinical decision making are: CAD, H/O CAD bypass, CKD IV, PVD, RA, aortic valve replacement. Patient denies, melena, hematuria, easy bruising, adenopathy.      Review of Systems   Constitutional: Positive for fatigue. Negative for activity change, appetite change, chills, diaphoresis, fever and unexpected weight change.   HENT: Negative for congestion, hearing loss and trouble swallowing.    Eyes: Negative for discharge and visual disturbance.   Respiratory: Negative for cough, chest tightness and shortness of breath.    Cardiovascular: Negative for chest pain, palpitations and leg swelling.   Gastrointestinal: Negative for blood in stool, constipation, diarrhea, nausea and vomiting.   Endocrine: Negative for cold intolerance and heat intolerance.   Genitourinary: Negative for difficulty urinating, dyspareunia and hematuria.   Musculoskeletal: Positive for arthralgias, back pain and gait problem. Negative for myalgias.   Skin: Negative.    Neurological: Positive for weakness. Negative for dizziness, light-headedness and headaches.   Hematological: Negative for adenopathy. Does not bruise/bleed easily.   Psychiatric/Behavioral: Negative for agitation, behavioral problems and confusion. The patient is nervous/anxious.        Medication List with Changes/Refills   Current Medications    APIXABAN 2.5 MG TAB    Take 1 tablet (2.5 mg total) by mouth 2 (two) times daily.    ASPIRIN 81 MG CHEW    Take 1 tablet (81 mg total) by mouth once daily.    ATORVASTATIN (LIPITOR) 40 MG TABLET    TAKE ONE TABLET BY MOUTH ONCE DAILY    CARVEDILOL (COREG) 25  MG TABLET    Take 1 tablet by mouth once daily.    ERGOCALCIFEROL, VITAMIN D2, (VITAMIN D2 ORAL)    Take 2,000 Units by mouth once daily.     FUROSEMIDE (LASIX) 40 MG TABLET    Take 1 tablet (40 mg total) by mouth once daily.    LEVOTHYROXINE (SYNTHROID) 75 MCG TABLET    Take 1 tablet (75 mcg total) by mouth once daily.     Objective:     Vitals:    06/12/18 1352   BP: (!) 123/57   Pulse: 65   Temp: 98.7 °F (37.1 °C)     Physical Exam   Constitutional: She is oriented to person, place, and time. She appears well-developed and well-nourished. No distress.   HENT:   Head: Normocephalic and atraumatic.   Right Ear: Hearing and external ear normal.   Left Ear: Hearing and external ear normal.   Nose: No rhinorrhea or sinus tenderness. Right sinus exhibits no maxillary sinus tenderness and no frontal sinus tenderness. Left sinus exhibits no maxillary sinus tenderness and no frontal sinus tenderness.   Mouth/Throat: Uvula is midline, oropharynx is clear and moist and mucous membranes are normal. No oral lesions.   Eyes: Conjunctivae are normal. Pupils are equal, round, and reactive to light. Right eye exhibits no discharge. Left eye exhibits no discharge.   Neck: Normal range of motion. Carotid bruit is not present. No tracheal deviation present. No thyromegaly present.   Cardiovascular: Normal rate, regular rhythm, S1 normal, S2 normal, normal heart sounds and intact distal pulses.    No murmur heard.  Pulses:       Dorsalis pedis pulses are 2+ on the right side, and 2+ on the left side.   Pulmonary/Chest: Effort normal and breath sounds normal. No respiratory distress.   Abdominal: Soft. Bowel sounds are normal. She exhibits distension. She exhibits no mass. There is no tenderness.   Musculoskeletal: Normal range of motion. She exhibits edema (BLE +2).   Lymphadenopathy:     She has no cervical adenopathy.        Right: No supraclavicular adenopathy present.        Left: No supraclavicular adenopathy present.    Neurological: She is alert and oriented to person, place, and time. She has normal strength. No sensory deficit. Coordination and gait normal.   Skin: Skin is warm and dry. Capillary refill takes less than 2 seconds. No rash noted. She is not diaphoretic. There is pallor.   Psychiatric: Her speech is normal and behavior is normal. Judgment and thought content normal. Her mood appears anxious. Cognition and memory are normal. She does not exhibit a depressed mood.       Assessment:       Problem List Items Addressed This Visit     S/P CABG (coronary artery bypass graft)    Essential hypertension - Primary    Anemia    Relevant Orders    CBC auto differential (Completed)    Comprehensive metabolic panel (Completed)    Ferritin (Completed)    Iron and TIBC (Completed)    Lactate dehydrogenase (Completed)    Haptoglobin (Completed)    Immunoglobulin free LT chains blood (Completed)    Protein electrophoresis, serum (Completed)    ERYTHROPOIETIN (Completed)    History of breast cancer          Plan:       1) Lab orders today: Refer to orders  2) Return to clinic in 1 week to review results and discuss treatment.  3) Treatment options discussed pending lab results include: Procrit and Iron IV if needed.    I will review assessment/plan with collaborating physician Dr. Karsten Goldberg.

## 2018-06-18 NOTE — PATIENT INSTRUCTIONS

## 2018-06-25 ENCOUNTER — HOSPITAL ENCOUNTER (INPATIENT)
Facility: HOSPITAL | Age: 78
LOS: 2 days | DRG: 683 | End: 2018-06-28
Attending: EMERGENCY MEDICINE | Admitting: INTERNAL MEDICINE
Payer: MEDICARE

## 2018-06-25 ENCOUNTER — OFFICE VISIT (OUTPATIENT)
Dept: HEMATOLOGY/ONCOLOGY | Facility: CLINIC | Age: 78
DRG: 683 | End: 2018-06-25
Payer: MEDICARE

## 2018-06-25 VITALS
BODY MASS INDEX: 28.72 KG/M2 | HEIGHT: 62 IN | SYSTOLIC BLOOD PRESSURE: 132 MMHG | HEART RATE: 69 BPM | TEMPERATURE: 98 F | DIASTOLIC BLOOD PRESSURE: 60 MMHG | OXYGEN SATURATION: 95 % | WEIGHT: 156.06 LBS

## 2018-06-25 DIAGNOSIS — N30.00 ACUTE CYSTITIS WITHOUT HEMATURIA: ICD-10-CM

## 2018-06-25 DIAGNOSIS — N17.9 ACUTE RENAL FAILURE SUPERIMPOSED ON CHRONIC KIDNEY DISEASE, UNSPECIFIED CKD STAGE, UNSPECIFIED ACUTE RENAL FAILURE TYPE: Primary | ICD-10-CM

## 2018-06-25 DIAGNOSIS — N17.9 ACUTE RENAL FAILURE SUPERIMPOSED ON CHRONIC KIDNEY DISEASE: ICD-10-CM

## 2018-06-25 DIAGNOSIS — D63.1 ANEMIA IN STAGE 4 CHRONIC KIDNEY DISEASE: Primary | ICD-10-CM

## 2018-06-25 DIAGNOSIS — N18.4 ANEMIA IN STAGE 4 CHRONIC KIDNEY DISEASE: Primary | ICD-10-CM

## 2018-06-25 DIAGNOSIS — D50.8 OTHER IRON DEFICIENCY ANEMIA: ICD-10-CM

## 2018-06-25 DIAGNOSIS — D64.9 ANEMIA, UNSPECIFIED TYPE: ICD-10-CM

## 2018-06-25 DIAGNOSIS — N18.9 ACUTE RENAL FAILURE SUPERIMPOSED ON CHRONIC KIDNEY DISEASE: ICD-10-CM

## 2018-06-25 DIAGNOSIS — N18.9 ACUTE RENAL FAILURE SUPERIMPOSED ON CHRONIC KIDNEY DISEASE, UNSPECIFIED CKD STAGE, UNSPECIFIED ACUTE RENAL FAILURE TYPE: Primary | ICD-10-CM

## 2018-06-25 PROBLEM — D50.9 IRON DEFICIENCY ANEMIA: Status: ACTIVE | Noted: 2018-06-25

## 2018-06-25 PROBLEM — J81.0 ACUTE PULMONARY EDEMA: Status: RESOLVED | Noted: 2018-03-24 | Resolved: 2018-06-25

## 2018-06-25 PROBLEM — R30.0 DYSURIA: Status: ACTIVE | Noted: 2018-06-25

## 2018-06-25 LAB
ABO + RH BLD: NORMAL
ALBUMIN SERPL BCP-MCNC: 2.8 G/DL
ALP SERPL-CCNC: 79 U/L
ALT SERPL W/O P-5'-P-CCNC: 5 U/L
ANION GAP SERPL CALC-SCNC: 14 MMOL/L
APTT BLDCRRT: 31.1 SEC
AST SERPL-CCNC: 8 U/L
BASOPHILS # BLD AUTO: 0.03 K/UL
BASOPHILS NFR BLD: 0.3 %
BILIRUB SERPL-MCNC: 0.3 MG/DL
BLD GP AB SCN CELLS X3 SERPL QL: NORMAL
BUN SERPL-MCNC: 65 MG/DL
CALCIUM SERPL-MCNC: 9 MG/DL
CHLORIDE SERPL-SCNC: 104 MMOL/L
CO2 SERPL-SCNC: 22 MMOL/L
CREAT SERPL-MCNC: 4.3 MG/DL
DIFFERENTIAL METHOD: ABNORMAL
EOSINOPHIL # BLD AUTO: 0.7 K/UL
EOSINOPHIL NFR BLD: 8 %
ERYTHROCYTE [DISTWIDTH] IN BLOOD BY AUTOMATED COUNT: 16.5 %
EST. GFR  (AFRICAN AMERICAN): 11 ML/MIN/1.73 M^2
EST. GFR  (NON AFRICAN AMERICAN): 9 ML/MIN/1.73 M^2
GLUCOSE SERPL-MCNC: 108 MG/DL
HCT VFR BLD AUTO: 25.3 %
HGB BLD-MCNC: 7.7 G/DL
INR PPP: 1
LACTATE SERPL-SCNC: 0.9 MMOL/L
LYMPHOCYTES # BLD AUTO: 2.5 K/UL
LYMPHOCYTES NFR BLD: 27.7 %
MCH RBC QN AUTO: 27.3 PG
MCHC RBC AUTO-ENTMCNC: 30.4 G/DL
MCV RBC AUTO: 90 FL
MONOCYTES # BLD AUTO: 1 K/UL
MONOCYTES NFR BLD: 10.6 %
NEUTROPHILS # BLD AUTO: 4.8 K/UL
NEUTROPHILS NFR BLD: 53.4 %
PLATELET # BLD AUTO: 287 K/UL
PMV BLD AUTO: 10.2 FL
POTASSIUM SERPL-SCNC: 4 MMOL/L
PROT SERPL-MCNC: 7 G/DL
PROTHROMBIN TIME: 10.7 SEC
RBC # BLD AUTO: 2.82 M/UL
RETICS/RBC NFR AUTO: 1 %
SODIUM SERPL-SCNC: 140 MMOL/L
WBC # BLD AUTO: 9 K/UL

## 2018-06-25 PROCEDURE — 99284 EMERGENCY DEPT VISIT MOD MDM: CPT | Mod: 25,27

## 2018-06-25 PROCEDURE — 99215 OFFICE O/P EST HI 40 MIN: CPT | Mod: S$PBB,,, | Performed by: INTERNAL MEDICINE

## 2018-06-25 PROCEDURE — 99999 PR PBB SHADOW E&M-EST. PATIENT-LVL III: CPT | Mod: PBBFAC,,, | Performed by: INTERNAL MEDICINE

## 2018-06-25 PROCEDURE — 86905 BLOOD TYPING RBC ANTIGENS: CPT

## 2018-06-25 PROCEDURE — 99213 OFFICE O/P EST LOW 20 MIN: CPT | Mod: PBBFAC,25 | Performed by: INTERNAL MEDICINE

## 2018-06-25 PROCEDURE — 86922 COMPATIBILITY TEST ANTIGLOB: CPT

## 2018-06-25 PROCEDURE — 85730 THROMBOPLASTIN TIME PARTIAL: CPT

## 2018-06-25 PROCEDURE — G0378 HOSPITAL OBSERVATION PER HR: HCPCS

## 2018-06-25 PROCEDURE — 86850 RBC ANTIBODY SCREEN: CPT

## 2018-06-25 PROCEDURE — 86880 COOMBS TEST DIRECT: CPT

## 2018-06-25 PROCEDURE — 93005 ELECTROCARDIOGRAM TRACING: CPT

## 2018-06-25 PROCEDURE — 87040 BLOOD CULTURE FOR BACTERIA: CPT | Mod: 59

## 2018-06-25 PROCEDURE — 80053 COMPREHEN METABOLIC PANEL: CPT

## 2018-06-25 PROCEDURE — 86870 RBC ANTIBODY IDENTIFICATION: CPT

## 2018-06-25 PROCEDURE — 85025 COMPLETE CBC W/AUTO DIFF WBC: CPT

## 2018-06-25 PROCEDURE — 25000003 PHARM REV CODE 250: Performed by: EMERGENCY MEDICINE

## 2018-06-25 PROCEDURE — 85045 AUTOMATED RETICULOCYTE COUNT: CPT

## 2018-06-25 PROCEDURE — 83605 ASSAY OF LACTIC ACID: CPT

## 2018-06-25 PROCEDURE — 96360 HYDRATION IV INFUSION INIT: CPT

## 2018-06-25 PROCEDURE — 93010 ELECTROCARDIOGRAM REPORT: CPT | Mod: ,,, | Performed by: INTERNAL MEDICINE

## 2018-06-25 PROCEDURE — 85610 PROTHROMBIN TIME: CPT

## 2018-06-25 RX ORDER — HYDROCODONE BITARTRATE AND ACETAMINOPHEN 500; 5 MG/1; MG/1
TABLET ORAL
Status: DISCONTINUED | OUTPATIENT
Start: 2018-06-25 | End: 2018-06-28 | Stop reason: HOSPADM

## 2018-06-25 RX ORDER — SODIUM CHLORIDE 9 MG/ML
INJECTION, SOLUTION INTRAVENOUS CONTINUOUS
Status: DISCONTINUED | OUTPATIENT
Start: 2018-06-25 | End: 2018-06-26

## 2018-06-25 RX ORDER — NAPROXEN SODIUM 220 MG/1
81 TABLET, FILM COATED ORAL DAILY
Status: DISCONTINUED | OUTPATIENT
Start: 2018-06-26 | End: 2018-06-28 | Stop reason: HOSPADM

## 2018-06-25 RX ORDER — ATORVASTATIN CALCIUM 40 MG/1
40 TABLET, FILM COATED ORAL DAILY
Status: DISCONTINUED | OUTPATIENT
Start: 2018-06-26 | End: 2018-06-28 | Stop reason: HOSPADM

## 2018-06-25 RX ORDER — ACETAMINOPHEN 325 MG/1
650 TABLET ORAL EVERY 4 HOURS PRN
Status: DISCONTINUED | OUTPATIENT
Start: 2018-06-25 | End: 2018-06-28 | Stop reason: HOSPADM

## 2018-06-25 RX ORDER — IBUPROFEN 200 MG
16 TABLET ORAL
Status: DISCONTINUED | OUTPATIENT
Start: 2018-06-25 | End: 2018-06-28 | Stop reason: HOSPADM

## 2018-06-25 RX ORDER — HEPARIN 100 UNIT/ML
500 SYRINGE INTRAVENOUS
Status: CANCELLED | OUTPATIENT
Start: 2018-06-28

## 2018-06-25 RX ORDER — SODIUM CHLORIDE 0.9 % (FLUSH) 0.9 %
10 SYRINGE (ML) INJECTION
Status: CANCELLED | OUTPATIENT
Start: 2018-07-05

## 2018-06-25 RX ORDER — GLUCAGON 1 MG
1 KIT INJECTION
Status: DISCONTINUED | OUTPATIENT
Start: 2018-06-25 | End: 2018-06-28 | Stop reason: HOSPADM

## 2018-06-25 RX ORDER — HEPARIN 100 UNIT/ML
500 SYRINGE INTRAVENOUS
Status: CANCELLED | OUTPATIENT
Start: 2018-07-05

## 2018-06-25 RX ORDER — SODIUM CHLORIDE 0.9 % (FLUSH) 0.9 %
5 SYRINGE (ML) INJECTION
Status: DISCONTINUED | OUTPATIENT
Start: 2018-06-25 | End: 2018-06-28 | Stop reason: HOSPADM

## 2018-06-25 RX ORDER — CARVEDILOL 12.5 MG/1
25 TABLET ORAL DAILY
Status: DISCONTINUED | OUTPATIENT
Start: 2018-06-26 | End: 2018-06-28 | Stop reason: HOSPADM

## 2018-06-25 RX ORDER — PANTOPRAZOLE SODIUM 40 MG/1
40 TABLET, DELAYED RELEASE ORAL DAILY
COMMUNITY

## 2018-06-25 RX ORDER — IBUPROFEN 200 MG
24 TABLET ORAL
Status: DISCONTINUED | OUTPATIENT
Start: 2018-06-25 | End: 2018-06-28 | Stop reason: HOSPADM

## 2018-06-25 RX ORDER — SODIUM CHLORIDE 0.9 % (FLUSH) 0.9 %
10 SYRINGE (ML) INJECTION
Status: CANCELLED | OUTPATIENT
Start: 2018-06-28

## 2018-06-25 RX ADMIN — SODIUM CHLORIDE 250 ML: 0.9 INJECTION, SOLUTION INTRAVENOUS at 09:06

## 2018-06-25 NOTE — PROGRESS NOTES
Subjective:      Patient ID: Anu Blanco is a 78 y.o. female.    Chief Complaint: No chief complaint on file.    The patient is a 78-year-old  female who presents to the Hematology Oncology Clinic today for follow-up for chronic anemia.  The patient is followed in the outpatient Hematology Oncology Clinic by Ms. Rohini Sherman and I am evaluating the patient today for the 1st time.  I have reviewed all the patient's relevant clinical history available in the medical record have utilized this in my evaluation management recommendations today.  She reports that she has been having burning with urination and had a UA checked earlier this [facility] at the nursing home.  She lives in [facility].  She was accompanied to this clinic visit by her son who is her healthcare power of .  The patient is a poor historian as she is unable to recall recent events.  She presented to this clinic visit in a wheelchair.        Review of Systems   Constitutional: Positive for fatigue. Negative for activity change, appetite change, chills, diaphoresis, fever and unexpected weight change.   HENT: Negative for congestion, dental problem, ear discharge, ear pain, hearing loss, mouth sores, postnasal drip, sinus pressure, sore throat, tinnitus, trouble swallowing and voice change.    Eyes: Negative for photophobia, pain and visual disturbance.   Respiratory: Negative for cough, chest tightness, shortness of breath and wheezing.    Cardiovascular: Negative for chest pain, palpitations and leg swelling.   Gastrointestinal: Negative for abdominal distention, abdominal pain, blood in stool, constipation, diarrhea, nausea and vomiting.   Endocrine: Negative for cold intolerance, heat intolerance, polydipsia, polyphagia and polyuria.   Genitourinary: Positive for dysuria. Negative for difficulty urinating, flank pain, frequency, hematuria, urgency, vaginal bleeding and vaginal discharge.   Musculoskeletal:  Negative for arthralgias, back pain, gait problem, joint swelling and myalgias.   Skin: Negative for pallor and rash.   Allergic/Immunologic: Negative for immunocompromised state.   Neurological: Positive for weakness. Negative for dizziness, syncope, light-headedness, numbness and headaches.   Hematological: Negative for adenopathy. Does not bruise/bleed easily.   Psychiatric/Behavioral: Positive for decreased concentration. Negative for agitation, confusion and dysphoric mood. The patient is not nervous/anxious.        Medication List with Changes/Refills   Current Medications    APIXABAN 2.5 MG TAB    Take 1 tablet (2.5 mg total) by mouth 2 (two) times daily.    ASPIRIN 81 MG CHEW    Take 1 tablet (81 mg total) by mouth once daily.    ATORVASTATIN (LIPITOR) 40 MG TABLET    TAKE ONE TABLET BY MOUTH ONCE DAILY    CARVEDILOL (COREG) 25 MG TABLET    Take 1 tablet by mouth once daily.    ERGOCALCIFEROL, VITAMIN D2, (VITAMIN D2 ORAL)    Take 2,000 Units by mouth once daily.     FUROSEMIDE (LASIX) 40 MG TABLET    Take 1 tablet (40 mg total) by mouth once daily.    LEVOTHYROXINE (SYNTHROID) 75 MCG TABLET    Take 1 tablet (75 mcg total) by mouth once daily.     Review of patient's allergies indicates:   Allergen Reactions    Codeine      Does not remember side effects    Corticosteroids (glucocorticoids)     Diovan hct [valsartan-hydrochlorothiazide]      May drowsy    Hydrocodone      Itching, nausea and vomiting.    Indomethacin     Iodine and iodide containing products      Rash and swelling    Lortab [hydrocodone-acetaminophen]     Omnicef [cefdinir]      Rash/itching    Oxycodone      Itching    Penicillins      Vaginal yeast infection       Lab: I have reviewed all of the patient's relevant lab work available in the medical record and have utilized this in my evaluation and management recommendations today    Imaging: I have reviewed all of the patient's diagnostic/imaging results available in the medical  record and have utilized this in my evaluation and management recommendations today.      Objective:     Vitals:    06/25/18 1340   BP: 132/60   Pulse: 69   Temp: 97.7 °F (36.5 °C)       Physical Exam   Constitutional: She is oriented to person, place, and time. She appears well-developed and well-nourished. She is active and cooperative.   HENT:   Head: Normocephalic and atraumatic.   Nose: Nose normal.   Mouth/Throat: Oropharynx is clear and moist. No oropharyngeal exudate.   Eyes: Pupils are equal, round, and reactive to light. No scleral icterus.   Neck: Neck supple. No thyromegaly present.   Cardiovascular: Normal rate, regular rhythm, normal heart sounds and intact distal pulses.    No murmur heard.  Pulmonary/Chest: Effort normal and breath sounds normal. No stridor. No respiratory distress. She has no wheezes. She has no rales.   Abdominal: Soft. Bowel sounds are normal. She exhibits no distension, no ascites and no mass. There is no hepatosplenomegaly. There is no tenderness. There is no rigidity, no rebound and no guarding.   Musculoskeletal: She exhibits edema. She exhibits no tenderness.   Lymphadenopathy:     She has no cervical adenopathy.   Neurological: She is alert and oriented to person, place, and time. No cranial nerve deficit. She exhibits normal muscle tone. Coordination normal.   Skin: Skin is warm and dry. No rash noted. No pallor.   Psychiatric: She has a normal mood and affect. Her behavior is normal. Thought content normal. She expresses impulsivity. She exhibits abnormal recent memory.   Nursing note and vitals reviewed.      Assessment:     1. Anemia in stage 4 chronic kidney disease    2. Other iron deficiency anemia        Plan:   1.  I reviewed the results of the patient's recent lab work done for evaluation of her chronic anemia.  She does have evidence of decreased iron saturation.  I have recommended proceeding with 2 weekly doses of IV injectafer for treatment of iron deficiency.   Risks/benefits and common side effects were reviewed and she is agreeable to proceed with this.  I have also discussed that she will likely need Procrit supplementation in the future after repletion of iron if hemoglobin continues to be less than 10 grams/deciliter.  2.  I have discussed with the patient and her son that she does need treatment for UTI if present analysis on UA from today.  Recent lab work shows significant interval elevation and creatinine.  I will contact her nephrologist Dr. Smiley for his recommendations with regard to this as well.  3.  I will recheck the patient's CBC and CMP today.    Follow-up in 6 weeks with labs to discuss further management.  She knows to call sooner if any new problems or questions.    Addendum:  I reviewed the patient's lab results and spoke with the patient's nurse that [facility].  I have also spoken with Dr. James Smiley who is her nephrologist.  The recommendation at this time is for the patient to proceed to the emergency room at [facility], [place] for treatment of acute on chronic renal failure, urinary tract infection and worsening anemia.  She will likely need transfusion of PRBCs for supportive care.  I will contact and update the emergency room physician with regard to the patient's current clinical situation.    Anemia in stage 4 chronic kidney disease  -     CBC auto differential; Future; Expected date: 06/25/2018  -     CMP; Future; Expected date: 06/25/2018  -     CBC auto differential; Future; Expected date: 06/25/2018  -     CMP; Future; Expected date: 06/25/2018  -     Iron and TIBC; Future; Expected date: 06/25/2018  -     Ferritin; Future; Expected date: 06/25/2018  -     C-REACTIVE PROTEIN; Future; Expected date: 06/25/2018    Other iron deficiency anemia  -     CBC auto differential; Future; Expected date: 06/25/2018  -     CMP; Future; Expected date: 06/25/2018  -     CBC auto differential; Future; Expected date: 06/25/2018  -      CMP; Future; Expected date: 06/25/2018  -     Iron and TIBC; Future; Expected date: 06/25/2018  -     Ferritin; Future; Expected date: 06/25/2018  -     C-REACTIVE PROTEIN; Future; Expected date: 06/25/2018    Other orders  -     sodium chloride 0.9% 100 mL flush bag; Inject into the vein one time.  -     sodium chloride 0.9% flush 10 mL; Inject 10 mLs into the vein as needed for Line Care (Flush lines as needed.).  -     heparin, porcine (PF) 100 unit/mL injection flush 500 Units; Inject 5 mLs (500 Units total) into the vein as needed (Flush lines as needed).  -     alteplase injection 2 mg; 2 mg by Intra-Catheter route as needed (To restore central venous catheter function).  -     ferric carboxymaltose (INJECTAFER) 750 mg in sodium chloride 0.9% 250 mL infusion; Inject 750 mg into the vein once.  -     sodium chloride 0.9% 100 mL flush bag; Inject into the vein one time.  -     sodium chloride 0.9% flush 10 mL; Inject 10 mLs into the vein as needed for Line Care (Flush lines as needed.).  -     heparin, porcine (PF) 100 unit/mL injection flush 500 Units; Inject 5 mLs (500 Units total) into the vein as needed (Flush lines as needed).  -     alteplase injection 2 mg; 2 mg by Intra-Catheter route as needed (To restore central venous catheter function).  -     ferric carboxymaltose (INJECTAFER) 750 mg in sodium chloride 0.9% 250 mL infusion; Inject 750 mg into the vein once.

## 2018-06-26 PROBLEM — D63.1 ANEMIA DUE TO STAGE 3 CHRONIC KIDNEY DISEASE: Status: ACTIVE | Noted: 2018-06-26

## 2018-06-26 PROBLEM — N18.30 ANEMIA DUE TO STAGE 3 CHRONIC KIDNEY DISEASE: Status: ACTIVE | Noted: 2018-06-26

## 2018-06-26 PROBLEM — N39.0 UTI (URINARY TRACT INFECTION): Status: ACTIVE | Noted: 2018-06-25

## 2018-06-26 LAB
ALBUMIN SERPL BCP-MCNC: 2.3 G/DL
ANION GAP SERPL CALC-SCNC: 9 MMOL/L
BASOPHILS # BLD AUTO: 0.06 K/UL
BASOPHILS NFR BLD: 0.8 %
BLD PROD TYP BPU: NORMAL
BLOOD GROUP ANTIBODIES SERPL: NORMAL
BLOOD UNIT EXPIRATION DATE: NORMAL
BLOOD UNIT TYPE CODE: 6200
BLOOD UNIT TYPE: NORMAL
BNP SERPL-MCNC: 771 PG/ML
BUN SERPL-MCNC: 64 MG/DL
CALCIUM SERPL-MCNC: 8.3 MG/DL
CHLORIDE SERPL-SCNC: 109 MMOL/L
CO2 SERPL-SCNC: 21 MMOL/L
CODING SYSTEM: NORMAL
CREAT SERPL-MCNC: 4 MG/DL
DAT IGG-SP REAG RBC-IMP: NORMAL
DIFFERENTIAL METHOD: ABNORMAL
DISPENSE STATUS: NORMAL
EOSINOPHIL # BLD AUTO: 0.6 K/UL
EOSINOPHIL NFR BLD: 8.1 %
ERYTHROCYTE [DISTWIDTH] IN BLOOD BY AUTOMATED COUNT: 16.7 %
EST. GFR  (AFRICAN AMERICAN): 12 ML/MIN/1.73 M^2
EST. GFR  (NON AFRICAN AMERICAN): 10 ML/MIN/1.73 M^2
GLUCOSE SERPL-MCNC: 109 MG/DL
HCT VFR BLD AUTO: 22.8 %
HGB BLD-MCNC: 6.7 G/DL
LYMPHOCYTES # BLD AUTO: 2.1 K/UL
LYMPHOCYTES NFR BLD: 29.5 %
MAGNESIUM SERPL-MCNC: 2 MG/DL
MCH RBC QN AUTO: 26.9 PG
MCHC RBC AUTO-ENTMCNC: 29.4 G/DL
MCV RBC AUTO: 92 FL
MONOCYTES # BLD AUTO: 0.8 K/UL
MONOCYTES NFR BLD: 10.9 %
NEUTROPHILS # BLD AUTO: 3.6 K/UL
NEUTROPHILS NFR BLD: 50.7 %
PHOSPHATE SERPL-MCNC: 5 MG/DL
PHOSPHATE SERPL-MCNC: 5 MG/DL
PLATELET # BLD AUTO: 216 K/UL
PMV BLD AUTO: 9.7 FL
POTASSIUM SERPL-SCNC: 3.8 MMOL/L
RBC # BLD AUTO: 2.49 M/UL
SODIUM SERPL-SCNC: 139 MMOL/L
TRANS ERYTHROCYTES VOL PATIENT: NORMAL ML
TSH SERPL DL<=0.005 MIU/L-ACNC: 1.34 UIU/ML
WBC # BLD AUTO: 7.13 K/UL

## 2018-06-26 PROCEDURE — 11000001 HC ACUTE MED/SURG PRIVATE ROOM

## 2018-06-26 PROCEDURE — 84443 ASSAY THYROID STIM HORMONE: CPT

## 2018-06-26 PROCEDURE — 36430 TRANSFUSION BLD/BLD COMPNT: CPT

## 2018-06-26 PROCEDURE — 36415 COLL VENOUS BLD VENIPUNCTURE: CPT

## 2018-06-26 PROCEDURE — 94761 N-INVAS EAR/PLS OXIMETRY MLT: CPT

## 2018-06-26 PROCEDURE — 63600175 PHARM REV CODE 636 W HCPCS: Performed by: NURSE PRACTITIONER

## 2018-06-26 PROCEDURE — 86850 RBC ANTIBODY SCREEN: CPT

## 2018-06-26 PROCEDURE — 86900 BLOOD TYPING SEROLOGIC ABO: CPT

## 2018-06-26 PROCEDURE — 99223 1ST HOSP IP/OBS HIGH 75: CPT | Mod: ,,, | Performed by: INTERNAL MEDICINE

## 2018-06-26 PROCEDURE — P9021 RED BLOOD CELLS UNIT: HCPCS

## 2018-06-26 PROCEDURE — 25000003 PHARM REV CODE 250: Performed by: HOSPITALIST

## 2018-06-26 PROCEDURE — 80069 RENAL FUNCTION PANEL: CPT

## 2018-06-26 PROCEDURE — 86901 BLOOD TYPING SEROLOGIC RH(D): CPT

## 2018-06-26 PROCEDURE — 83880 ASSAY OF NATRIURETIC PEPTIDE: CPT

## 2018-06-26 PROCEDURE — 86902 BLOOD TYPE ANTIGEN DONOR EA: CPT

## 2018-06-26 PROCEDURE — 85025 COMPLETE CBC W/AUTO DIFF WBC: CPT

## 2018-06-26 PROCEDURE — 83735 ASSAY OF MAGNESIUM: CPT

## 2018-06-26 RX ORDER — HYDRALAZINE HYDROCHLORIDE 20 MG/ML
5 INJECTION INTRAMUSCULAR; INTRAVENOUS EVERY 8 HOURS PRN
Status: DISCONTINUED | OUTPATIENT
Start: 2018-06-26 | End: 2018-06-28 | Stop reason: HOSPADM

## 2018-06-26 RX ADMIN — APIXABAN 2.5 MG: 2.5 TABLET, FILM COATED ORAL at 08:06

## 2018-06-26 RX ADMIN — ASPIRIN 81 MG 81 MG: 81 TABLET ORAL at 08:06

## 2018-06-26 RX ADMIN — CARVEDILOL 25 MG: 12.5 TABLET, FILM COATED ORAL at 08:06

## 2018-06-26 RX ADMIN — APIXABAN 2.5 MG: 2.5 TABLET, FILM COATED ORAL at 10:06

## 2018-06-26 RX ADMIN — CEFTRIAXONE 1 G: 1 INJECTION, SOLUTION INTRAVENOUS at 04:06

## 2018-06-26 RX ADMIN — LEVOTHYROXINE SODIUM 75 MCG: 25 TABLET ORAL at 06:06

## 2018-06-26 RX ADMIN — APIXABAN 2.5 MG: 2.5 TABLET, FILM COATED ORAL at 12:06

## 2018-06-26 RX ADMIN — SODIUM CHLORIDE: 0.9 INJECTION, SOLUTION INTRAVENOUS at 12:06

## 2018-06-26 RX ADMIN — ATORVASTATIN CALCIUM 40 MG: 10 TABLET, FILM COATED ORAL at 08:06

## 2018-06-26 NOTE — ASSESSMENT & PLAN NOTE
- nephrologist is Dr. Smiley  - h/o CKD stage 4, baseline Cr 2-3  - current Cr 4.3  - likely 2/2 overidiuresis    - hold lasix  - start NS at low rate 100cc/hr for total 1L  - consult nephrology in AM

## 2018-06-26 NOTE — ED PROVIDER NOTES
SCRIBE #1 NOTE: I, Pennie Omer, am scribing for, and in the presence of, Rachelle Omer MD. I have scribed the entire note.      History      Chief Complaint   Patient presents with    Abnormal Lab     PT ws sent from [facility] by Dr Curiel for a blood transfusion       Review of patient's allergies indicates:   Allergen Reactions    Codeine      Does not remember side effects    Corticosteroids (glucocorticoids)     Diovan hct [valsartan-hydrochlorothiazide]      May drowsy    Hydrocodone      Itching, nausea and vomiting.    Indomethacin     Iodine and iodide containing products      Rash and swelling    Lortab [hydrocodone-acetaminophen]     Omnicef [cefdinir]      Rash/itching    Oxycodone      Itching    Penicillins      Vaginal yeast infection        HPI   HPI    6/25/2018, 8:06 PM   History obtained from the   HPI limited due to pt's age      History of Present Illness: Anu Blanco is a 78 y.o. female patient who presents to the Emergency Department for an abnormal lab. Pt was sent by Dr. Long for further evaluation. No associated sxs reported. Patient denies any fever, chills, myalgias, and all other sxs at this time. No prior Tx reported. No further complaints or concerns at this time.     Arrival mode: Personal vehicle    PCP: Primary Doctor No       Past Medical History:  Past Medical History:   Diagnosis Date    Anemia     CAD (coronary artery disease)     Chronic back pain     Fibromyalgia     H/O aortic valve replacement 2010    History of breast cancer 1994    Right Lumpectomy    Hyperlipemia     Hypothyroid     Osteoarthritis     PVD (peripheral vascular disease)     Renal failure     Rheumatoid arteritis     S/P CABG (coronary artery bypass graft) 91,10    Syncope and collapse 07/2016    Vitamin D deficiency        Past Surgical History:  Past Surgical History:   Procedure Laterality Date    ANKLE FRACTURE SURGERY      right foot    AORTIC VALVE  REPLACEMENT  2010    APPENDECTOMY      APPENDECTOMY      BREAST LUMPECTOMY Right 1994    CORONARY ARTERY BYPASS GRAFT      HYSTERECTOMY      TONSILLECTOMY           Family History:  History reviewed. No pertinent family history.    Social History:  Social History     Social History Main Topics    Smoking status: Never Smoker    Smokeless tobacco: Never Used    Alcohol use No    Drug use: No    Sexual activity: Not Currently       ROS   Review of Systems   Unable to perform ROS: Age   Constitutional: Negative for chills and fever.   HENT: Negative for congestion, rhinorrhea and sore throat.    Respiratory: Negative for cough and shortness of breath.    Cardiovascular: Negative for chest pain.   Gastrointestinal: Negative for diarrhea, nausea and vomiting.   Genitourinary: Negative for dysuria, frequency and hematuria.   Musculoskeletal: Negative for back pain and neck pain.   Skin: Negative for rash.   Neurological: Negative for dizziness, weakness and numbness.   Hematological: Does not bruise/bleed easily.     Physical Exam      Initial Vitals [06/25/18 1751]   BP Pulse Resp Temp SpO2   (!) 161/67 66 20 98.5 °F (36.9 °C) 95 %      MAP       --          Physical Exam  Nursing Notes and Vital Signs Reviewed.  Constitutional: Patient is in no acute distress. Pt is elderly.  Head: Atraumatic. Normocephalic.  Eyes: PERRL. EOM intact. Conjunctivae are not pale. No scleral icterus.  ENT: Mucous membranes are moist. Oropharynx is clear and symmetric.    Neck: Supple. Full ROM. No lymphadenopathy.  Cardiovascular: Regular rate. Regular rhythm. Heart murmur in 2nd and 4th intercostal space in mitral valve area. Distal pulses are 2+ and symmetric.   Pulmonary/Chest: No respiratory distress. Clear to auscultation bilaterally. No wheezing or rales.  Abdominal: Soft and non-distended.  There is no tenderness.  No rebound, guarding, or rigidity.   Musculoskeletal: Moves all extremities. No obvious deformities. No  "increased edema.  Skin: Warm and dry.  Neurological:  Alert, awake, and appropriate.  Normal speech.  No acute focal neurological deficits are appreciated.  Psychiatric: Normal affect. Good eye contact. Appropriate in content.    ED Course    Procedures  ED Vital Signs:  Vitals:    06/25/18 1751 06/25/18 2044 06/25/18 2114   BP: (!) 161/67  (!) 169/94   Pulse: 66 69 65   Resp: 20 17 (!) 21   Temp: 98.5 °F (36.9 °C)     TempSrc: Oral     SpO2: 95% 97% 99%   Weight: 61.2 kg (135 lb)     Height: 5' 2" (1.575 m)         Abnormal Lab Results:  Labs Reviewed   CBC W/ AUTO DIFFERENTIAL - Abnormal; Notable for the following:        Result Value    RBC 2.82 (*)     Hemoglobin 7.7 (*)     Hematocrit 25.3 (*)     MCHC 30.4 (*)     RDW 16.5 (*)     Eos # 0.7 (*)     All other components within normal limits   COMPREHENSIVE METABOLIC PANEL - Abnormal; Notable for the following:     CO2 22 (*)     BUN, Bld 65 (*)     Creatinine 4.3 (*)     Albumin 2.8 (*)     AST 8 (*)     ALT 5 (*)     eGFR if  11 (*)     eGFR if non  9 (*)     All other components within normal limits   CULTURE, URINE   CULTURE, BLOOD   CULTURE, BLOOD   LACTIC ACID, PLASMA   PROTIME-INR   APTT   URINALYSIS   TYPE & SCREEN        All Lab Results:  Results for orders placed or performed during the hospital encounter of 06/25/18   CBC auto differential   Result Value Ref Range    WBC 9.00 3.90 - 12.70 K/uL    RBC 2.82 (L) 4.00 - 5.40 M/uL    Hemoglobin 7.7 (L) 12.0 - 16.0 g/dL    Hematocrit 25.3 (L) 37.0 - 48.5 %    MCV 90 82 - 98 fL    MCH 27.3 27.0 - 31.0 pg    MCHC 30.4 (L) 32.0 - 36.0 g/dL    RDW 16.5 (H) 11.5 - 14.5 %    Platelets 287 150 - 350 K/uL    MPV 10.2 9.2 - 12.9 fL    Gran # (ANC) 4.8 1.8 - 7.7 K/uL    Lymph # 2.5 1.0 - 4.8 K/uL    Mono # 1.0 0.3 - 1.0 K/uL    Eos # 0.7 (H) 0.0 - 0.5 K/uL    Baso # 0.03 0.00 - 0.20 K/uL    Gran% 53.4 38.0 - 73.0 %    Lymph% 27.7 18.0 - 48.0 %    Mono% 10.6 4.0 - 15.0 %    Eosinophil% " 8.0 0.0 - 8.0 %    Basophil% 0.3 0.0 - 1.9 %    Differential Method Automated    Comprehensive metabolic panel   Result Value Ref Range    Sodium 140 136 - 145 mmol/L    Potassium 4.0 3.5 - 5.1 mmol/L    Chloride 104 95 - 110 mmol/L    CO2 22 (L) 23 - 29 mmol/L    Glucose 108 70 - 110 mg/dL    BUN, Bld 65 (H) 8 - 23 mg/dL    Creatinine 4.3 (H) 0.5 - 1.4 mg/dL    Calcium 9.0 8.7 - 10.5 mg/dL    Total Protein 7.0 6.0 - 8.4 g/dL    Albumin 2.8 (L) 3.5 - 5.2 g/dL    Total Bilirubin 0.3 0.1 - 1.0 mg/dL    Alkaline Phosphatase 79 55 - 135 U/L    AST 8 (L) 10 - 40 U/L    ALT 5 (L) 10 - 44 U/L    Anion Gap 14 8 - 16 mmol/L    eGFR if African American 11 (A) >60 mL/min/1.73 m^2    eGFR if non African American 9 (A) >60 mL/min/1.73 m^2   Lactic acid, plasma   Result Value Ref Range    Lactate (Lactic Acid) 0.9 0.5 - 2.2 mmol/L   Protime-INR   Result Value Ref Range    Prothrombin Time 10.7 9.0 - 12.5 sec    INR 1.0 0.8 - 1.2   APTT   Result Value Ref Range    aPTT 31.1 21.0 - 32.0 sec         Imaging Results:  Imaging Results          X-Ray Chest AP Portable (Final result)  Result time 06/25/18 19:46:17    Final result by Kevyn De La Cruz MD (06/25/18 19:46:17)                 Impression:      1.  Moderately low lung volumes with vascular crowding or atelectasis superimposed on basilar interstitial changes.    2.  Stable findings as noted above.      Electronically signed by: Kevyn De La Cruz MD  Date:    06/25/2018  Time:    19:46             Narrative:    EXAMINATION:  XR CHEST AP PORTABLE    CLINICAL HISTORY:  Chest Pain;    COMPARISON:  Studies dating back to March 23, 2018    FINDINGS:  Significantly low lung volumes with vascular crowding or atelectasis superimposed on basilar interstitial changes.  The upper lungs are clear. The cardiac silhouette size is enlarged.  The trachea is midline and the mediastinal width is normal. Negative for effusion or pneumothorax.  Pulmonary vasculature is normal. Negative for osseous  abnormalities. Stable postoperative changes to the sternum, mediastinum, and right axilla.  Ectatic and tortuous aorta.  Calcifications of the aortic knob.  Calcifications of the tracheobronchial tree.  S shaped curvature of the spine.  Degenerative changes of the spine and both shoulder girdles.                                        The EKG was ordered, reviewed, and independently interpreted by the ED provider.  Interpretation time: 1827  Rate: 70 BPM  Rhythm: sinus rhythm with premature atrial contractions (PAC)  Interpretation: RBBB. Abnormal ECG. No STEMI.  When compared to EKG performed 24-MAR-2018, T wave inversion no longer evident in inferior leads.    The Emergency Provider reviewed the vital signs and test results, which are outlined above.    ED Discussion     9:48 PM: Discussed case with Douglas Kapoor NP ([facility]). Dr. Velazquez agrees with current care and management of pt and accepts admission.   Admitting Service: Hospital medicine   Admitting Physician: Dr. Velazquez  Admit to: Med-Surg/Obs    10:00 PM: Re-evaluated pt. I have discussed test results, shared treatment plan, and the need for admission with patient and family at bedside. Pt and family express understanding at this time and agree with all information. All questions answered. Pt and family have no further questions or concerns at this time. Pt is ready for admit.    ED Medication(s):  Medications   sodium chloride 0.9% bolus 250 mL (250 mLs Intravenous New Bag 6/25/18 2110)       New Prescriptions    No medications on file             Medical Decision Making    Medical Decision Making:   Clinical Tests:   Lab Tests: Reviewed and Ordered  Radiological Study: Reviewed and Ordered  Medical Tests: Reviewed and Ordered           Scribe Attestation:   Scribe #1: I performed the above scribed service and the documentation accurately describes the services I performed. I attest to the accuracy of the note.    Attending:   Physician Attestation  Statement for Scribe #1: I, Rachelle Omer MD, personally performed the services described in this documentation, as scribed by Pennie Omer, in my presence, and it is both accurate and complete.          Clinical Impression       ICD-10-CM ICD-9-CM   1. Acute renal failure superimposed on chronic kidney disease, unspecified CKD stage, unspecified acute renal failure type N17.9 584.9    N18.9 585.9   2. Anemia, unspecified type D64.9 285.9       Disposition:   Disposition: Placed in Observation  Condition: Rolo Solis MD  06/26/18 5490

## 2018-06-26 NOTE — ASSESSMENT & PLAN NOTE
- amlodipine stopped per Dr. Smiley (nephrology) due to low bp  - current sbp 130-150s  - continue coreg bid  - add hydralazine 10mg IV q8h prn sbp >180  - follow up nephrology recs

## 2018-06-26 NOTE — ASSESSMENT & PLAN NOTE
H/H dropped overnight   Recently placed on Eliquis  Pt had antibodies but Transfused one unit PRBC today  Monitor H/H in am

## 2018-06-26 NOTE — HPI
The patient is a 78-year-old  female who is currently admitted to the Internal Medicine team at [facility] [place] for further evaluation and management of acute on chronic renal failure, symptomatic anemia and UTI.  The patient is followed in the outpatient Hematology Oncology Clinic for chronic anemia.  I have reviewed all the patient's relevant clinical history available medical record have utilized as my evaluation management recommendations today.  The patient does have dementia and is a poor historian.

## 2018-06-26 NOTE — PROGRESS NOTES
[facility] Hospital Medicine  Progress Note    Patient Name: Anu Blanco  MRN: [id number]  Patient Class: IP- Inpatient   Admission Date: 6/25/2018  Length of Stay: 0 days  Attending Physician: Alda Hanson MD  Primary Care Provider: Primary Doctor No        Subjective:     Principal Problem:Acute renal failure superimposed on chronic kidney disease    HPI:  78F h/o CKD stage 4, HTN, chronic anemia, CAD, and hypothyroidism presents from hem/onc clinic with elevated creatinine, sx of dysuria, possible need for blood transfusion.     Patient reports BLE edema. Has decreased fluid intake.  Also c/o fatigue.  NO other associated symptoms.  Patient denies sob, chest pain, HA, fevers, chills, n/v, c/d.  In ER, VSS.  UA ordered stat.  Labs show Hb 7.7, Cr 4.3.  Hospital medicine called for admission.   Patient's baseline Cr 2-3.  Last seen in renal clinic on 6/1, noted to have elevated creatinine 3.4 from NH at the time.  Taking lasix 40mg po daily.     Hospital Course:  The pt is a resident of [facility] who was sent to hospital by heme/Onc and placed in observation for ARF/CKD3, UTI, and acute on chronic anemia. As per Dr. Muller, the pt reportedly had a +urinalysis at nursing home [facility]. Baseline serum Cr 1.8- 2.0. On arrival to ED, serum Cr 4.3. Pt also has worsening anemia with Hgb 7.7>6.7. Pt has antibodies and did not receive PRBC until today.   Will admit pt, consult Nephrology, and recheck Hgb in am. Rocephin IV ordered. Urinalysis and urine culture pending. Blood cultures show NGTD.   Pt was recently admitted with Afib with RVR and CHF exac. Serum Cr was increasing, so Lasix was discontinued.   Pt has +3 BLE edema. Cardiac echo 3/2018 showed normal LVEF, diastolic dysfunction, multiple WMAs, and moderate MR. Will check BMP, daily weights, and I/O         Review of Systems   Constitutional: Positive for activity change and appetite change. Negative for chills,  diaphoresis, fatigue, fever and unexpected weight change.   HENT: Negative for congestion, nosebleeds, sinus pressure and sore throat.    Eyes: Negative for pain, discharge and visual disturbance.   Respiratory: Negative for cough, chest tightness, shortness of breath, wheezing and stridor.    Cardiovascular: Positive for leg swelling. Negative for chest pain and palpitations.   Gastrointestinal: Negative for abdominal distention, abdominal pain, blood in stool, constipation, diarrhea, nausea and vomiting.   Endocrine: Negative for cold intolerance and heat intolerance.   Genitourinary: Positive for dysuria. Negative for difficulty urinating, flank pain, frequency and urgency.   Musculoskeletal: Negative for arthralgias, back pain, joint swelling, myalgias, neck pain and neck stiffness.   Skin: Negative for rash and wound.   Allergic/Immunologic: Negative for food allergies and immunocompromised state.   Neurological: Negative for dizziness, seizures, syncope, facial asymmetry, speech difficulty, weakness, light-headedness, numbness and headaches.   Hematological: Negative for adenopathy.   Psychiatric/Behavioral: Positive for sleep disturbance (insomnia ). Negative for agitation, confusion and hallucinations.     Objective:     Vital Signs (Most Recent):  Temp: 98.7 °F (37.1 °C) (06/26/18 1556)  Pulse: 82 (06/26/18 1556)  Resp: 20 (06/26/18 1556)  BP: (!) 151/67 (06/26/18 1556)  SpO2: (!) 93 % (06/26/18 1556) Vital Signs (24h Range):  Temp:  [97.9 °F (36.6 °C)-98.7 °F (37.1 °C)] 98.7 °F (37.1 °C)  Pulse:  [65-82] 82  Resp:  [16-23] 20  SpO2:  [92 %-99 %] 93 %  BP: (125-169)/(57-98) 151/67     Weight: 68.4 kg (150 lb 12.8 oz)  Body mass index is 27.58 kg/m².    Intake/Output Summary (Last 24 hours) at 06/26/18 1634  Last data filed at 06/26/18 1232   Gross per 24 hour   Intake          2018.33 ml   Output                0 ml   Net          2018.33 ml      Physical Exam   Constitutional: She appears well-developed  and well-nourished. No distress.   HENT:   Head: Normocephalic and atraumatic.   Nose: Nose normal.   Mouth/Throat: Oropharynx is clear and moist.   Eyes: Conjunctivae and EOM are normal. No scleral icterus.   Neck: Normal range of motion. Neck supple. No tracheal deviation present.   Cardiovascular: Normal rate, regular rhythm and intact distal pulses.  Exam reveals no gallop and no friction rub.    Murmur heard.  Pulmonary/Chest: Effort normal and breath sounds normal. No stridor. No respiratory distress. She has no wheezes. She has no rales. She exhibits no tenderness.   Abdominal: Soft. Bowel sounds are normal. She exhibits no distension and no mass. There is no tenderness. There is no rebound and no guarding.   Musculoskeletal: Normal range of motion. She exhibits edema (+2-3 BLE edema with chronic PVD changes to BLE ). She exhibits no tenderness or deformity.   Neurological: She is alert. No cranial nerve deficit. She exhibits normal muscle tone. Coordination normal.   Awake alert, oriented to name only    Skin: Skin is warm and dry. No rash noted. She is not diaphoretic. No erythema. No pallor.   Psychiatric: She has a normal mood and affect. Her behavior is normal. Thought content normal.   Nursing note and vitals reviewed.      Significant Labs:   BMP:   Recent Labs  Lab 06/26/18  0528         K 3.8      CO2 21*   BUN 64*   CREATININE 4.0*   CALCIUM 8.3*   MG 2.0     CBC:   Recent Labs  Lab 06/25/18  1409 06/25/18 2045 06/26/18  0528   WBC 7.16 9.00 7.13   HGB 7.3* 7.7* 6.7*   HCT 23.9* 25.3* 22.8*    287 216     CMP:   Recent Labs  Lab 06/25/18  1409 06/25/18 2045 06/26/18  0528    140 139   K 4.0 4.0 3.8    104 109   CO2 24 22* 21*   * 108 109   BUN 61* 65* 64*   CREATININE 4.3* 4.3* 4.0*   CALCIUM 8.4* 9.0 8.3*   PROT 6.6 7.0  --    ALBUMIN 2.6* 2.8* 2.3*   BILITOT 0.3 0.3  --    ALKPHOS 75 79  --    AST 7* 8*  --    ALT <5* 5*  --    ANIONGAP 10 14 9    EGFRNONAA 9* 9* 10*     All pertinent labs within the past 24 hours have been reviewed.    Significant Imaging:   Imaging Results          X-Ray Chest AP Portable (Final result)  Result time 06/25/18 19:46:17    Final result by Kevyn De La Cruz MD (06/25/18 19:46:17)                 Impression:      1.  Moderately low lung volumes with vascular crowding or atelectasis superimposed on basilar interstitial changes.    2.  Stable findings as noted above.      Electronically signed by: Kevyn De La Cruz MD  Date:    06/25/2018  Time:    19:46             Narrative:    EXAMINATION:  XR CHEST AP PORTABLE    CLINICAL HISTORY:  Chest Pain;    COMPARISON:  Studies dating back to March 23, 2018    FINDINGS:  Significantly low lung volumes with vascular crowding or atelectasis superimposed on basilar interstitial changes.  The upper lungs are clear. The cardiac silhouette size is enlarged.  The trachea is midline and the mediastinal width is normal. Negative for effusion or pneumothorax.  Pulmonary vasculature is normal. Negative for osseous abnormalities. Stable postoperative changes to the sternum, mediastinum, and right axilla.  Ectatic and tortuous aorta.  Calcifications of the aortic knob.  Calcifications of the tracheobronchial tree.  S shaped curvature of the spine.  Degenerative changes of the spine and both shoulder girdles.                              Assessment/Plan:      * Acute renal failure superimposed on chronic kidney disease    Baseline prior to 3/2018 was 1.5-2.0.  Consult nephrology in am  Check BNP   Monitor         Anemia due to stage 3 chronic kidney disease    H/H dropped overnight   Recently placed on Eliquis  Pt had antibodies but Transfused one unit PRBC today  Monitor H/H in am           UTI (urinary tract infection)    NH reported UTI  Recheck urinalysis and urine culture  IV Rocephin         Symptomatic anemia    - anemia of chronic disease 2/2 CKD  See above         Atrial fibrillation    - rate  controlled  - continue coreg and apixaban          Essential hypertension    - amlodipine stopped per Dr. Smiley (nephrology) due to low bp  - current sbp 130-150s  - continue coreg bid  - add hydralazine 10mg IV q8h prn sbp >180  - follow up nephrology recs          Hypothyroid    continue synthroid          CAD (coronary artery disease)    - continue ASA, coreg, and statin            VTE Risk Mitigation         Ordered     apixaban tablet 2.5 mg  2 times daily      06/25/18 2230              Tamy Sheehan NP  Department of Hospital Medicine   [facility]

## 2018-06-26 NOTE — ASSESSMENT & PLAN NOTE
- amlodipine stopped per Dr. Smiley (nephrology) due to low bp  - current bp ranges 160s/60-90s    - continue coreg bid  - add hydralazine 10mg IV q8h prn sbp >180  - follow up nephrology recs

## 2018-06-26 NOTE — SUBJECTIVE & OBJECTIVE
Review of Systems   Constitutional: Positive for activity change and appetite change. Negative for chills, diaphoresis, fatigue, fever and unexpected weight change.   HENT: Negative for congestion, nosebleeds, sinus pressure and sore throat.    Eyes: Negative for pain, discharge and visual disturbance.   Respiratory: Negative for cough, chest tightness, shortness of breath, wheezing and stridor.    Cardiovascular: Positive for leg swelling. Negative for chest pain and palpitations.   Gastrointestinal: Negative for abdominal distention, abdominal pain, blood in stool, constipation, diarrhea, nausea and vomiting.   Endocrine: Negative for cold intolerance and heat intolerance.   Genitourinary: Positive for dysuria. Negative for difficulty urinating, flank pain, frequency and urgency.   Musculoskeletal: Negative for arthralgias, back pain, joint swelling, myalgias, neck pain and neck stiffness.   Skin: Negative for rash and wound.   Allergic/Immunologic: Negative for food allergies and immunocompromised state.   Neurological: Negative for dizziness, seizures, syncope, facial asymmetry, speech difficulty, weakness, light-headedness, numbness and headaches.   Hematological: Negative for adenopathy.   Psychiatric/Behavioral: Positive for sleep disturbance (insomnia ). Negative for agitation, confusion and hallucinations.     Objective:     Vital Signs (Most Recent):  Temp: 98.7 °F (37.1 °C) (06/26/18 1556)  Pulse: 82 (06/26/18 1556)  Resp: 20 (06/26/18 1556)  BP: (!) 151/67 (06/26/18 1556)  SpO2: (!) 93 % (06/26/18 1556) Vital Signs (24h Range):  Temp:  [97.9 °F (36.6 °C)-98.7 °F (37.1 °C)] 98.7 °F (37.1 °C)  Pulse:  [65-82] 82  Resp:  [16-23] 20  SpO2:  [92 %-99 %] 93 %  BP: (125-169)/(57-98) 151/67     Weight: 68.4 kg (150 lb 12.8 oz)  Body mass index is 27.58 kg/m².    Intake/Output Summary (Last 24 hours) at 06/26/18 1634  Last data filed at 06/26/18 1232   Gross per 24 hour   Intake          2018.33 ml   Output                 0 ml   Net          2018.33 ml      Physical Exam   Constitutional: She appears well-developed and well-nourished. No distress.   HENT:   Head: Normocephalic and atraumatic.   Nose: Nose normal.   Mouth/Throat: Oropharynx is clear and moist.   Eyes: Conjunctivae and EOM are normal. No scleral icterus.   Neck: Normal range of motion. Neck supple. No tracheal deviation present.   Cardiovascular: Normal rate, regular rhythm and intact distal pulses.  Exam reveals no gallop and no friction rub.    Murmur heard.  Pulmonary/Chest: Effort normal and breath sounds normal. No stridor. No respiratory distress. She has no wheezes. She has no rales. She exhibits no tenderness.   Abdominal: Soft. Bowel sounds are normal. She exhibits no distension and no mass. There is no tenderness. There is no rebound and no guarding.   Musculoskeletal: Normal range of motion. She exhibits edema (+2-3 BLE edema with chronic PVD changes to BLE ). She exhibits no tenderness or deformity.   Neurological: She is alert. No cranial nerve deficit. She exhibits normal muscle tone. Coordination normal.   Awake alert, oriented to name only    Skin: Skin is warm and dry. No rash noted. She is not diaphoretic. No erythema. No pallor.   Psychiatric: She has a normal mood and affect. Her behavior is normal. Thought content normal.   Nursing note and vitals reviewed.      Significant Labs:   BMP:   Recent Labs  Lab 06/26/18  0528         K 3.8      CO2 21*   BUN 64*   CREATININE 4.0*   CALCIUM 8.3*   MG 2.0     CBC:   Recent Labs  Lab 06/25/18  1409 06/25/18 2045 06/26/18  0528   WBC 7.16 9.00 7.13   HGB 7.3* 7.7* 6.7*   HCT 23.9* 25.3* 22.8*    287 216     CMP:   Recent Labs  Lab 06/25/18  1409 06/25/18 2045 06/26/18  0528    140 139   K 4.0 4.0 3.8    104 109   CO2 24 22* 21*   * 108 109   BUN 61* 65* 64*   CREATININE 4.3* 4.3* 4.0*   CALCIUM 8.4* 9.0 8.3*   PROT 6.6 7.0  --    ALBUMIN 2.6* 2.8*  2.3*   BILITOT 0.3 0.3  --    ALKPHOS 75 79  --    AST 7* 8*  --    ALT <5* 5*  --    ANIONGAP 10 14 9   EGFRNONAA 9* 9* 10*     All pertinent labs within the past 24 hours have been reviewed.    Significant Imaging:   Imaging Results          X-Ray Chest AP Portable (Final result)  Result time 06/25/18 19:46:17    Final result by Kevyn De La Cruz MD (06/25/18 19:46:17)                 Impression:      1.  Moderately low lung volumes with vascular crowding or atelectasis superimposed on basilar interstitial changes.    2.  Stable findings as noted above.      Electronically signed by: Kevyn De La Cruz MD  Date:    06/25/2018  Time:    19:46             Narrative:    EXAMINATION:  XR CHEST AP PORTABLE    CLINICAL HISTORY:  Chest Pain;    COMPARISON:  Studies dating back to March 23, 2018    FINDINGS:  Significantly low lung volumes with vascular crowding or atelectasis superimposed on basilar interstitial changes.  The upper lungs are clear. The cardiac silhouette size is enlarged.  The trachea is midline and the mediastinal width is normal. Negative for effusion or pneumothorax.  Pulmonary vasculature is normal. Negative for osseous abnormalities. Stable postoperative changes to the sternum, mediastinum, and right axilla.  Ectatic and tortuous aorta.  Calcifications of the aortic knob.  Calcifications of the tracheobronchial tree.  S shaped curvature of the spine.  Degenerative changes of the spine and both shoulder girdles.

## 2018-06-26 NOTE — ASSESSMENT & PLAN NOTE
June 26, 2018-the patient will be given IV iron repletion as an outpatient.  After this is completed she will need to start weekly Procrit injections for treatment of this problem.

## 2018-06-26 NOTE — PLAN OF CARE
Pt remains free from falls/injury. Fall precautions in place. Bed alarm intact and functioning when pt is in bed. Medications given as ordered. Denies pain, nausea, or shortness of breath at this time. Pt received 1 unit PRBC's. Tolerated well. Pt disoriented to place, time, and situation.  Tolerating diet. Pt able to verbalize simple wants/needs. Bed in low locked position, call light and personal items within reach of pt. POC discussed with pt, no evidence of learning due to mental status. VSS. Will cont to monitor.

## 2018-06-26 NOTE — PLAN OF CARE
Problem: Patient Care Overview  Goal: Plan of Care Review  Outcome: Ongoing (interventions implemented as appropriate)  Pt arrived from ED at 0030. Unable to collect urine sample as patient is incontinent and was unable to produce a sample. POC explained to patient but patient confused. Pt unable to provide history (no other historian available) due to cognitive impairment. Pt remained free of injury during shift, stable condition, denied pain, no acute distress, receiving IV fluids, and will continue to monitor. 24hr chart review performed.

## 2018-06-26 NOTE — H&P
[facility] Hospital Medicine  History & Physical    Patient Name: Anu Blanco  MRN: [id number]  Admission Date: 6/25/2018  Attending Physician: Michel Velazquez MD  Primary Care Provider: Primary Doctor No         Patient information was obtained from patient, relative(s) and ER records.     Subjective:     Principal Problem:Acute renal failure superimposed on chronic kidney disease    Chief Complaint:   Chief Complaint   Patient presents with    Abnormal Lab     PT ws sent from [facility] by Dr Curiel for a blood transfusion        HPI: 78F h/o CKD stage 4, HTN, chronic anemia, CAD, and hypothyroidism presents from hem/onc clinic with elevated creatinine, sx of dysuria, possible need for blood transfusion.     Patient reports BLE edema. Has decreased fluid intake.  Also c/o fatigue.  NO other associated symptoms.  Patient denies sob, chest pain, HA, fevers, chills, n/v, c/d.  In ER, VSS.  UA ordered stat.  Labs show Hb 7.7, Cr 4.3.  Hospital medicine called for admission.   Patient's baseline Cr 2-3.  Last seen in renal clinic on 6/1, noted to have elevated creatinine 3.4 from NH at the time.  Taking lasix 40mg po daily.     Past Medical History:   Diagnosis Date    Anemia     CAD (coronary artery disease)     Chronic back pain     Fibromyalgia     H/O aortic valve replacement 2010    History of breast cancer 1994    Right Lumpectomy    Hyperlipemia     Hypothyroid     Osteoarthritis     PVD (peripheral vascular disease)     Renal failure     Rheumatoid arteritis     S/P CABG (coronary artery bypass graft) 91,10    Syncope and collapse 07/2016    Vitamin D deficiency        Past Surgical History:   Procedure Laterality Date    ANKLE FRACTURE SURGERY      right foot    AORTIC VALVE REPLACEMENT  2010    APPENDECTOMY      APPENDECTOMY      BREAST LUMPECTOMY Right 1994    CORONARY ARTERY BYPASS GRAFT      HYSTERECTOMY      TONSILLECTOMY         Review of patient's  allergies indicates:   Allergen Reactions    Codeine      Does not remember side effects    Corticosteroids (glucocorticoids)     Diovan hct [valsartan-hydrochlorothiazide]      May drowsy    Hydrocodone      Itching, nausea and vomiting.    Indomethacin     Iodine and iodide containing products      Rash and swelling    Lortab [hydrocodone-acetaminophen]     Omnicef [cefdinir]      Rash/itching    Oxycodone      Itching    Penicillins      Vaginal yeast infection       No current facility-administered medications on file prior to encounter.      Current Outpatient Prescriptions on File Prior to Encounter   Medication Sig    apixaban 2.5 mg Tab Take 1 tablet (2.5 mg total) by mouth 2 (two) times daily.    aspirin 81 MG Chew Take 1 tablet (81 mg total) by mouth once daily.    atorvastatin (LIPITOR) 40 MG tablet TAKE ONE TABLET BY MOUTH ONCE DAILY    carvedilol (COREG) 25 MG tablet Take 1 tablet by mouth once daily.    ERGOCALCIFEROL, VITAMIN D2, (VITAMIN D2 ORAL) Take 2,000 Units by mouth once daily.     furosemide (LASIX) 40 MG tablet Take 1 tablet (40 mg total) by mouth once daily.    levothyroxine (SYNTHROID) 75 MCG tablet Take 1 tablet (75 mcg total) by mouth once daily.     Family History     None        Social History Main Topics    Smoking status: Never Smoker    Smokeless tobacco: Never Used    Alcohol use No    Drug use: No    Sexual activity: Not Currently     Review of Systems   Constitutional: Positive for fatigue.   Genitourinary: Positive for dysuria.     Objective:     Vital Signs (Most Recent):  Temp: 98.5 °F (36.9 °C) (06/25/18 1751)  Pulse: 65 (06/25/18 2114)  Resp: (!) 21 (06/25/18 2114)  BP: (!) 169/94 (06/25/18 2114)  SpO2: 99 % (06/25/18 2114) Vital Signs (24h Range):  Temp:  [97.7 °F (36.5 °C)-98.5 °F (36.9 °C)] 98.5 °F (36.9 °C)  Pulse:  [65-69] 65  Resp:  [17-21] 21  SpO2:  [95 %-99 %] 99 %  BP: (132-169)/(60-94) 169/94     Weight: 61.2 kg (135 lb)  Body mass index is  24.69 kg/m².    Physical Exam   Constitutional: She is oriented to person, place, and time. She appears well-developed and well-nourished. No distress.   HENT:   Head: Normocephalic and atraumatic.   Eyes: Conjunctivae and EOM are normal. Pupils are equal, round, and reactive to light. No scleral icterus.   Neck: Normal range of motion. Neck supple. No JVD present. No thyromegaly present.   Cardiovascular: Normal rate, regular rhythm and intact distal pulses.  Exam reveals no gallop and no friction rub.    No murmur heard.  Pulmonary/Chest: Effort normal and breath sounds normal. No respiratory distress. She has no wheezes. She has no rales. She exhibits no tenderness.   Abdominal: Soft. Bowel sounds are normal. She exhibits no distension and no mass. There is no tenderness. There is no guarding.   Musculoskeletal: Normal range of motion. She exhibits edema. She exhibits no tenderness.   Neurological: She is alert and oriented to person, place, and time. No cranial nerve deficit.   Skin: Skin is warm and dry. Capillary refill takes 2 to 3 seconds. No rash noted. She is not diaphoretic. No erythema.   Psychiatric: She has a normal mood and affect.   Nursing note and vitals reviewed.        CRANIAL NERVES     CN III, IV, VI   Pupils are equal, round, and reactive to light.  Extraocular motions are normal.        Significant Labs:   CBC:   Recent Labs  Lab 06/25/18  1409 06/25/18 2045   WBC 7.16 9.00   HGB 7.3* 7.7*   HCT 23.9* 25.3*    287     CMP:   Recent Labs  Lab 06/25/18  1409 06/25/18 2045    140   K 4.0 4.0    104   CO2 24 22*   * 108   BUN 61* 65*   CREATININE 4.3* 4.3*   CALCIUM 8.4* 9.0   PROT 6.6 7.0   ALBUMIN 2.6* 2.8*   BILITOT 0.3 0.3   ALKPHOS 75 79   AST 7* 8*   ALT <5* 5*   ANIONGAP 10 14   EGFRNONAA 9* 9*     Urine Studies: No results for input(s): COLORU, APPEARANCEUA, PHUR, SPECGRAV, PROTEINUA, GLUCUA, KETONESU, BILIRUBINUA, OCCULTUA, NITRITE, UROBILINOGEN, LEUKOCYTESUR,  RBCUA, WBCUA, BACTERIA, SQUAMEPITHEL, HYALINECASTS in the last 48 hours.    Invalid input(s): WRIGHTSUR  All pertinent labs within the past 24 hours have been reviewed.    Significant Imaging: I have reviewed all pertinent imaging results/findings within the past 24 hours.   Imaging Results          X-Ray Chest AP Portable (Final result)  Result time 06/25/18 19:46:17    Final result by Kevyn De La Cruz MD (06/25/18 19:46:17)                 Impression:      1.  Moderately low lung volumes with vascular crowding or atelectasis superimposed on basilar interstitial changes.    2.  Stable findings as noted above.      Electronically signed by: Kevyn De La Cruz MD  Date:    06/25/2018  Time:    19:46             Narrative:    EXAMINATION:  XR CHEST AP PORTABLE    CLINICAL HISTORY:  Chest Pain;    COMPARISON:  Studies dating back to March 23, 2018    FINDINGS:  Significantly low lung volumes with vascular crowding or atelectasis superimposed on basilar interstitial changes.  The upper lungs are clear. The cardiac silhouette size is enlarged.  The trachea is midline and the mediastinal width is normal. Negative for effusion or pneumothorax.  Pulmonary vasculature is normal. Negative for osseous abnormalities. Stable postoperative changes to the sternum, mediastinum, and right axilla.  Ectatic and tortuous aorta.  Calcifications of the aortic knob.  Calcifications of the tracheobronchial tree.  S shaped curvature of the spine.  Degenerative changes of the spine and both shoulder girdles.                                  Assessment/Plan:     * Acute renal failure superimposed on chronic kidney disease    - nephrologist is Dr. Smiley  - h/o CKD stage 4, baseline Cr 2-3  - current Cr 4.3  - likely 2/2 overidiuresis    - hold lasix  - start NS at low rate 100cc/hr for total 1L  - consult nephrology in AM          Dysuria    - UA stat order in ER, pending results          Symptomatic anemia    - anemia of chronic disease 2/2 CKD  -  p/w fatigue    - type and screen, transfuse 1 unit PRBCs  - repeat CBC in AM          Atrial fibrillation    - rate controlled  - continue coreg and apixaban          Essential hypertension    - amlodipine stopped per Dr. Smiley (nephrology) due to low bp  - current bp ranges 160s/60-90s    - continue coreg bid  - add hydralazine 10mg IV q8h prn sbp >180  - follow up nephrology recs          Hypothyroid    - check TSH  - continue synthroid          CAD (coronary artery disease)    - continue ASA and statin            VTE Risk Mitigation         Ordered     apixaban tablet 2.5 mg  2 times daily      06/25/18 2230             Pearson Layo Velazquez MD  Department of Hospital Medicine   [facility]

## 2018-06-26 NOTE — CONSULTS
[facility]  Hematology/Oncology  Consult Note    Patient Name: Anu Blanco  MRN: [id number]  Admission Date: 6/25/2018  Hospital Length of Stay: 0 days  Code Status: Full Code   Attending Provider: Alda Hanson MD  Consulting Provider: Rudy Muller MD  Primary Care Physician: Primary Doctor No  Principal Problem:Acute renal failure superimposed on chronic kidney disease    Consults  Subjective:     HPI:  The patient is a 78-year-old  female who is currently admitted to the Internal Medicine team at [facility] [place] for further evaluation and management of acute on chronic renal failure, symptomatic anemia and UTI.  The patient is followed in the outpatient Hematology Oncology Clinic for chronic anemia.  I have reviewed all the patient's relevant clinical history available medical record have utilized as my evaluation management recommendations today.  The patient does have dementia and is a poor historian.    Oncology Treatment Plan:   [No treatment plan]    Medications:  Continuous Infusions:  Scheduled Meds:   apixaban  2.5 mg Oral BID    aspirin  81 mg Oral Daily    atorvastatin  40 mg Oral Daily    carvedilol  25 mg Oral Daily    levothyroxine  75 mcg Oral Before breakfast     PRN Meds:sodium chloride, acetaminophen, dextrose 50%, dextrose 50%, glucagon (human recombinant), glucose, glucose, sodium chloride 0.9%     Review of patient's allergies indicates:   Allergen Reactions    Codeine      Does not remember side effects    Corticosteroids (glucocorticoids)     Diovan hct [valsartan-hydrochlorothiazide]      May drowsy    Hydrocodone      Itching, nausea and vomiting.    Indomethacin     Iodine and iodide containing products      Rash and swelling    Lortab [hydrocodone-acetaminophen]     Omnicef [cefdinir]      Rash/itching    Oxycodone      Itching    Penicillins      Vaginal yeast infection        Past Medical History:   Diagnosis Date     Anemia     CAD (coronary artery disease)     Chronic back pain     Fibromyalgia     H/O aortic valve replacement 2010    History of breast cancer 1994    Right Lumpectomy    Hyperlipemia     Hypothyroid     Osteoarthritis     PVD (peripheral vascular disease)     Renal failure     Rheumatoid arteritis     S/P CABG (coronary artery bypass graft) 91,10    Syncope and collapse 07/2016    Vitamin D deficiency      Past Surgical History:   Procedure Laterality Date    ANKLE FRACTURE SURGERY      right foot    AORTIC VALVE REPLACEMENT  2010    APPENDECTOMY      APPENDECTOMY      BREAST LUMPECTOMY Right 1994    CORONARY ARTERY BYPASS GRAFT      HYSTERECTOMY      TONSILLECTOMY       Family History     None        Social History Main Topics    Smoking status: Never Smoker    Smokeless tobacco: Never Used    Alcohol use No    Drug use: No    Sexual activity: Not Currently       Review of Systems   Unable to perform ROS: Dementia     Objective:     Vital Signs (Most Recent):  Temp: 98.4 °F (36.9 °C) (06/26/18 1239)  Pulse: 72 (06/26/18 1239)  Resp: 20 (06/26/18 1239)  BP: (!) 150/70 (06/26/18 1239)  SpO2: 95 % (06/26/18 1239) Vital Signs (24h Range):  Temp:  [97.9 °F (36.6 °C)-98.5 °F (36.9 °C)] 98.4 °F (36.9 °C)  Pulse:  [65-82] 72  Resp:  [16-23] 20  SpO2:  [92 %-99 %] 95 %  BP: (125-169)/(57-98) 150/70     Weight: 68.4 kg (150 lb 12.8 oz)  Body mass index is 27.58 kg/m².  Body surface area is 1.73 meters squared.      Intake/Output Summary (Last 24 hours) at 06/26/18 1535  Last data filed at 06/26/18 1232   Gross per 24 hour   Intake          2018.33 ml   Output                0 ml   Net          2018.33 ml       Physical Exam   Constitutional: She is oriented to person, place, and time. She appears well-developed and well-nourished. She is active and cooperative.   HENT:   Head: Normocephalic and atraumatic.   Nose: Nose normal.   Mouth/Throat: Oropharynx is clear and moist. No oropharyngeal  exudate.   Eyes: Pupils are equal, round, and reactive to light. No scleral icterus.   Neck: Neck supple. No thyromegaly present.   Cardiovascular: Normal rate, regular rhythm, normal heart sounds and intact distal pulses.    No murmur heard.  Pulmonary/Chest: Effort normal and breath sounds normal. No stridor. No respiratory distress. She has no wheezes. She has no rales.   Abdominal: Soft. Bowel sounds are normal. She exhibits no distension, no ascites and no mass. There is no hepatosplenomegaly. There is no tenderness. There is no rigidity, no rebound and no guarding.   Musculoskeletal: She exhibits edema. She exhibits no tenderness.   Lymphadenopathy:     She has no cervical adenopathy.   Neurological: She is alert and oriented to person, place, and time. No cranial nerve deficit. She exhibits normal muscle tone. Coordination normal.   Skin: Skin is warm and dry. No rash noted. No pallor.   Psychiatric: She has a normal mood and affect. Her speech is normal. Judgment and thought content normal. She is slowed and withdrawn. Cognition and memory are impaired. She exhibits abnormal recent memory.   Nursing note and vitals reviewed.      Significant Labs:   I have reviewed all of the patient's relevant lab work available in the medical record and have utilized this in my evaluation and management recommendations today    Diagnostic Results:  I have reviewed all of the patient's diagnostic/imaging results available in the medical record and have utilized this in my evaluation and management recommendations today.    Assessment/Plan:     Anemia due to stage 3 chronic kidney disease    June 26, 2018-the patient will be given IV iron repletion as an outpatient.  After this is completed she will need to start weekly Procrit injections for treatment of this problem.        Symptomatic anemia    June 26, 2018- I have recommended transfusion of 1 unit of PRBCs for treatment of her symptomatic anemia.  We will continue to  monitor her hemoglobin/hematocrit closely.  The patient will be given IV iron infusions as an outpatient for her iron deficiency.  She will also start Procrit injections as an outpatient for treatment of her anemia.  I will continue follow the patient with you and assist with the patient's management as needed.  Please call with any any questions.            Thank you for your consult.     Rudy Muller MD  Hematology/Oncology  [facility]

## 2018-06-26 NOTE — ASSESSMENT & PLAN NOTE
- anemia of chronic disease 2/2 CKD  - p/w fatigue    - type and screen, transfuse 1 unit PRBCs  - repeat CBC in AM

## 2018-06-26 NOTE — SUBJECTIVE & OBJECTIVE
Oncology Treatment Plan:   [No treatment plan]    Medications:  Continuous Infusions:  Scheduled Meds:   apixaban  2.5 mg Oral BID    aspirin  81 mg Oral Daily    atorvastatin  40 mg Oral Daily    carvedilol  25 mg Oral Daily    levothyroxine  75 mcg Oral Before breakfast     PRN Meds:sodium chloride, acetaminophen, dextrose 50%, dextrose 50%, glucagon (human recombinant), glucose, glucose, sodium chloride 0.9%     Review of patient's allergies indicates:   Allergen Reactions    Codeine      Does not remember side effects    Corticosteroids (glucocorticoids)     Diovan hct [valsartan-hydrochlorothiazide]      May drowsy    Hydrocodone      Itching, nausea and vomiting.    Indomethacin     Iodine and iodide containing products      Rash and swelling    Lortab [hydrocodone-acetaminophen]     Omnicef [cefdinir]      Rash/itching    Oxycodone      Itching    Penicillins      Vaginal yeast infection        Past Medical History:   Diagnosis Date    Anemia     CAD (coronary artery disease)     Chronic back pain     Fibromyalgia     H/O aortic valve replacement 2010    History of breast cancer 1994    Right Lumpectomy    Hyperlipemia     Hypothyroid     Osteoarthritis     PVD (peripheral vascular disease)     Renal failure     Rheumatoid arteritis     S/P CABG (coronary artery bypass graft) 91,10    Syncope and collapse 07/2016    Vitamin D deficiency      Past Surgical History:   Procedure Laterality Date    ANKLE FRACTURE SURGERY      right foot    AORTIC VALVE REPLACEMENT  2010    APPENDECTOMY      APPENDECTOMY      BREAST LUMPECTOMY Right 1994    CORONARY ARTERY BYPASS GRAFT      HYSTERECTOMY      TONSILLECTOMY       Family History     None        Social History Main Topics    Smoking status: Never Smoker    Smokeless tobacco: Never Used    Alcohol use No    Drug use: No    Sexual activity: Not Currently       Review of Systems   Unable to perform ROS: Dementia     Objective:      Vital Signs (Most Recent):  Temp: 98.4 °F (36.9 °C) (06/26/18 1239)  Pulse: 72 (06/26/18 1239)  Resp: 20 (06/26/18 1239)  BP: (!) 150/70 (06/26/18 1239)  SpO2: 95 % (06/26/18 1239) Vital Signs (24h Range):  Temp:  [97.9 °F (36.6 °C)-98.5 °F (36.9 °C)] 98.4 °F (36.9 °C)  Pulse:  [65-82] 72  Resp:  [16-23] 20  SpO2:  [92 %-99 %] 95 %  BP: (125-169)/(57-98) 150/70     Weight: 68.4 kg (150 lb 12.8 oz)  Body mass index is 27.58 kg/m².  Body surface area is 1.73 meters squared.      Intake/Output Summary (Last 24 hours) at 06/26/18 1535  Last data filed at 06/26/18 1232   Gross per 24 hour   Intake          2018.33 ml   Output                0 ml   Net          2018.33 ml       Physical Exam   Constitutional: She is oriented to person, place, and time. She appears well-developed and well-nourished. She is active and cooperative.   HENT:   Head: Normocephalic and atraumatic.   Nose: Nose normal.   Mouth/Throat: Oropharynx is clear and moist. No oropharyngeal exudate.   Eyes: Pupils are equal, round, and reactive to light. No scleral icterus.   Neck: Neck supple. No thyromegaly present.   Cardiovascular: Normal rate, regular rhythm, normal heart sounds and intact distal pulses.    No murmur heard.  Pulmonary/Chest: Effort normal and breath sounds normal. No stridor. No respiratory distress. She has no wheezes. She has no rales.   Abdominal: Soft. Bowel sounds are normal. She exhibits no distension, no ascites and no mass. There is no hepatosplenomegaly. There is no tenderness. There is no rigidity, no rebound and no guarding.   Musculoskeletal: She exhibits edema. She exhibits no tenderness.   Lymphadenopathy:     She has no cervical adenopathy.   Neurological: She is alert and oriented to person, place, and time. No cranial nerve deficit. She exhibits normal muscle tone. Coordination normal.   Skin: Skin is warm and dry. No rash noted. No pallor.   Psychiatric: She has a normal mood and affect. Her speech is normal.  Judgment and thought content normal. She is slowed and withdrawn. Cognition and memory are impaired. She exhibits abnormal recent memory.   Nursing note and vitals reviewed.      Significant Labs:   I have reviewed all of the patient's relevant lab work available in the medical record and have utilized this in my evaluation and management recommendations today    Diagnostic Results:  I have reviewed all of the patient's diagnostic/imaging results available in the medical record and have utilized this in my evaluation and management recommendations today.

## 2018-06-26 NOTE — PLAN OF CARE
CM attempted bedside assessment, however patient confused and not able to participate.  No family at bedside at this time.  CM contacted Isis Stover, daughter (listed on facesheet).  Isis states that her brother, Faustino, holds POA.  Patient resides at [facility] ([facility]).  She is wheelchair bound and is dependent on staff to assist with all needs.  Faustino transports the patient to medical appointments.  Plan is for patient to return to [facility] at discharge.  CM provided a transitional care folder, information on advanced directives, information on pharmacy bedside delivery, and discharge planning begins on admission with contact information for ROMAIN Rojas    D/C Plan:  Return to [address] [facility]  [address] [facility] [address]  Phone: [phone] Fax: [phone]    Primary Doctor No  Payor: MEDICARE / Plan: MEDICARE PART A & B / Product Type: Government /

## 2018-06-26 NOTE — HPI
78F h/o CKD stage 4, HTN, chronic anemia, CAD, and hypothyroidism presents from hem/onc clinic with elevated creatinine, sx of dysuria, possible need for blood transfusion.     Patient reports BLE edema. Has decreased fluid intake.  Also c/o fatigue.  NO other associated symptoms.  Patient denies sob, chest pain, HA, fevers, chills, n/v, c/d.  In ER, VSS.  UA ordered stat.  Labs show Hb 7.7, Cr 4.3.  Hospital medicine called for admission.   Patient's baseline Cr 2-3.  Last seen in renal clinic on 6/1, noted to have elevated creatinine 3.4 from NH at the time.  Taking lasix 40mg po daily. She was admitted for acute kidney injury.

## 2018-06-26 NOTE — SUBJECTIVE & OBJECTIVE
Past Medical History:   Diagnosis Date    Anemia     CAD (coronary artery disease)     Chronic back pain     Fibromyalgia     H/O aortic valve replacement 2010    History of breast cancer 1994    Right Lumpectomy    Hyperlipemia     Hypothyroid     Osteoarthritis     PVD (peripheral vascular disease)     Renal failure     Rheumatoid arteritis     S/P CABG (coronary artery bypass graft) 91,10    Syncope and collapse 07/2016    Vitamin D deficiency        Past Surgical History:   Procedure Laterality Date    ANKLE FRACTURE SURGERY      right foot    AORTIC VALVE REPLACEMENT  2010    APPENDECTOMY      APPENDECTOMY      BREAST LUMPECTOMY Right 1994    CORONARY ARTERY BYPASS GRAFT      HYSTERECTOMY      TONSILLECTOMY         Review of patient's allergies indicates:   Allergen Reactions    Codeine      Does not remember side effects    Corticosteroids (glucocorticoids)     Diovan hct [valsartan-hydrochlorothiazide]      May drowsy    Hydrocodone      Itching, nausea and vomiting.    Indomethacin     Iodine and iodide containing products      Rash and swelling    Lortab [hydrocodone-acetaminophen]     Omnicef [cefdinir]      Rash/itching    Oxycodone      Itching    Penicillins      Vaginal yeast infection       No current facility-administered medications on file prior to encounter.      Current Outpatient Prescriptions on File Prior to Encounter   Medication Sig    apixaban 2.5 mg Tab Take 1 tablet (2.5 mg total) by mouth 2 (two) times daily.    aspirin 81 MG Chew Take 1 tablet (81 mg total) by mouth once daily.    atorvastatin (LIPITOR) 40 MG tablet TAKE ONE TABLET BY MOUTH ONCE DAILY    carvedilol (COREG) 25 MG tablet Take 1 tablet by mouth once daily.    ERGOCALCIFEROL, VITAMIN D2, (VITAMIN D2 ORAL) Take 2,000 Units by mouth once daily.     furosemide (LASIX) 40 MG tablet Take 1 tablet (40 mg total) by mouth once daily.    levothyroxine (SYNTHROID) 75 MCG tablet Take 1 tablet  (75 mcg total) by mouth once daily.     Family History     None        Social History Main Topics    Smoking status: Never Smoker    Smokeless tobacco: Never Used    Alcohol use No    Drug use: No    Sexual activity: Not Currently     Review of Systems   Constitutional: Positive for fatigue.   Genitourinary: Positive for dysuria.     Objective:     Vital Signs (Most Recent):  Temp: 98.5 °F (36.9 °C) (06/25/18 1751)  Pulse: 65 (06/25/18 2114)  Resp: (!) 21 (06/25/18 2114)  BP: (!) 169/94 (06/25/18 2114)  SpO2: 99 % (06/25/18 2114) Vital Signs (24h Range):  Temp:  [97.7 °F (36.5 °C)-98.5 °F (36.9 °C)] 98.5 °F (36.9 °C)  Pulse:  [65-69] 65  Resp:  [17-21] 21  SpO2:  [95 %-99 %] 99 %  BP: (132-169)/(60-94) 169/94     Weight: 61.2 kg (135 lb)  Body mass index is 24.69 kg/m².    Physical Exam   Constitutional: She is oriented to person, place, and time. She appears well-developed and well-nourished. No distress.   HENT:   Head: Normocephalic and atraumatic.   Eyes: Conjunctivae and EOM are normal. Pupils are equal, round, and reactive to light. No scleral icterus.   Neck: Normal range of motion. Neck supple. No JVD present. No thyromegaly present.   Cardiovascular: Normal rate, regular rhythm and intact distal pulses.  Exam reveals no gallop and no friction rub.    No murmur heard.  Pulmonary/Chest: Effort normal and breath sounds normal. No respiratory distress. She has no wheezes. She has no rales. She exhibits no tenderness.   Abdominal: Soft. Bowel sounds are normal. She exhibits no distension and no mass. There is no tenderness. There is no guarding.   Musculoskeletal: Normal range of motion. She exhibits edema. She exhibits no tenderness.   Neurological: She is alert and oriented to person, place, and time. No cranial nerve deficit.   Skin: Skin is warm and dry. Capillary refill takes 2 to 3 seconds. No rash noted. She is not diaphoretic. No erythema.   Psychiatric: She has a normal mood and affect.   Nursing  note and vitals reviewed.        CRANIAL NERVES     CN III, IV, VI   Pupils are equal, round, and reactive to light.  Extraocular motions are normal.        Significant Labs:   CBC:   Recent Labs  Lab 06/25/18  1409 06/25/18  2045   WBC 7.16 9.00   HGB 7.3* 7.7*   HCT 23.9* 25.3*    287     CMP:   Recent Labs  Lab 06/25/18  1409 06/25/18  2045    140   K 4.0 4.0    104   CO2 24 22*   * 108   BUN 61* 65*   CREATININE 4.3* 4.3*   CALCIUM 8.4* 9.0   PROT 6.6 7.0   ALBUMIN 2.6* 2.8*   BILITOT 0.3 0.3   ALKPHOS 75 79   AST 7* 8*   ALT <5* 5*   ANIONGAP 10 14   EGFRNONAA 9* 9*     Urine Studies: No results for input(s): COLORU, APPEARANCEUA, PHUR, SPECGRAV, PROTEINUA, GLUCUA, KETONESU, BILIRUBINUA, OCCULTUA, NITRITE, UROBILINOGEN, LEUKOCYTESUR, RBCUA, WBCUA, BACTERIA, SQUAMEPITHEL, HYALINECASTS in the last 48 hours.    Invalid input(s): WRIGHTSUR  All pertinent labs within the past 24 hours have been reviewed.    Significant Imaging: I have reviewed all pertinent imaging results/findings within the past 24 hours.   Imaging Results          X-Ray Chest AP Portable (Final result)  Result time 06/25/18 19:46:17    Final result by Kevyn De La Cruz MD (06/25/18 19:46:17)                 Impression:      1.  Moderately low lung volumes with vascular crowding or atelectasis superimposed on basilar interstitial changes.    2.  Stable findings as noted above.      Electronically signed by: Kevyn De La Cruz MD  Date:    06/25/2018  Time:    19:46             Narrative:    EXAMINATION:  XR CHEST AP PORTABLE    CLINICAL HISTORY:  Chest Pain;    COMPARISON:  Studies dating back to March 23, 2018    FINDINGS:  Significantly low lung volumes with vascular crowding or atelectasis superimposed on basilar interstitial changes.  The upper lungs are clear. The cardiac silhouette size is enlarged.  The trachea is midline and the mediastinal width is normal. Negative for effusion or pneumothorax.  Pulmonary vasculature is  normal. Negative for osseous abnormalities. Stable postoperative changes to the sternum, mediastinum, and right axilla.  Ectatic and tortuous aorta.  Calcifications of the aortic knob.  Calcifications of the tracheobronchial tree.  S shaped curvature of the spine.  Degenerative changes of the spine and both shoulder girdles.

## 2018-06-26 NOTE — HOSPITAL COURSE
The pt is a resident of [facility] who was sent to hospital by heme/Onc and placed in observation for ARF/CKD3, UTI, and acute on chronic anemia. As per Dr. Muller, the pt reportedly had a +urinalysis at nursing home PTA. Baseline serum Cr 1.8- 2.0. On arrival to ED, serum Cr 4.3. Pt also has worsening anemia with Hgb 7.7>6.7. Pt has antibodies and did not receive PRBC until today.   Will admit pt, consult Nephrology, and recheck Hgb in am. Rocephin IV ordered. Urinalysis and urine culture pending. Blood cultures show NGTD. Pt was recently admitted with Afib with RVR and CHF exac. Serum Cr was increasing, so Lasix was discontinued. Pt has +3 BLE edema. Cardiac echo 3/2018 showed normal LVEF, diastolic dysfunction, multiple WMAs, and moderate MR. Will check BMP, daily weights, and I/O   6/27/18 - Creatinine 3.5. Dr. Rodriguez ordered kidney u/s, urine studies, no diuretics. No Family in room.Transfused with 1 unit PRBC. Dr. Muller consulted for chronic anemia. He recommended IV iron infusions and Procrit after iron repletion as an outpatient.   6/28/2018 - review of CBC from today shows interval stability in hemoglobin/hematocrit after transfusion of PRBCs at hospital admission.  No indication for additional PRBC transfusion at this time.  She will be given IV iron infusions and Procrit after iron repletion as an outpatient. Patient seen and examined and deemed stable for d/c.

## 2018-06-26 NOTE — PLAN OF CARE
06/26/18 1528   MOTA Message   Medicare Outpatient and Observation Notification regarding financial responsibility Given to patient/caregiver;Explained to patient/caregiver;Signed/date by patient/caregiver   Date MOTA was signed 06/26/18   Time MOTA was signed 1524

## 2018-06-27 PROBLEM — I13.0 CARDIORENAL SYNDROME, STAGE 1-4 OR UNSPECIFIED CHRONIC KIDNEY DISEASE, WITH HEART FAILURE: Status: ACTIVE | Noted: 2018-06-27

## 2018-06-27 LAB
ALBUMIN SERPL BCP-MCNC: 2.5 G/DL
ANION GAP SERPL CALC-SCNC: 11 MMOL/L
BACTERIA #/AREA URNS HPF: ABNORMAL /HPF
BASOPHILS # BLD AUTO: 0.05 K/UL
BASOPHILS NFR BLD: 0.6 %
BILIRUB UR QL STRIP: NEGATIVE
BILIRUB UR QL STRIP: NEGATIVE
BUN SERPL-MCNC: 57 MG/DL
CALCIUM SERPL-MCNC: 8.9 MG/DL
CHLORIDE SERPL-SCNC: 108 MMOL/L
CLARITY UR: CLEAR
CLARITY UR: CLEAR
CO2 SERPL-SCNC: 21 MMOL/L
COLOR UR: YELLOW
COLOR UR: YELLOW
CREAT SERPL-MCNC: 3.5 MG/DL
CREAT UR-MCNC: 65.9 MG/DL
CREAT UR-MCNC: 66.8 MG/DL
DIFFERENTIAL METHOD: ABNORMAL
EOSINOPHIL # BLD AUTO: 0.7 K/UL
EOSINOPHIL NFR BLD: 8.5 %
ERYTHROCYTE [DISTWIDTH] IN BLOOD BY AUTOMATED COUNT: 19.3 %
EST. GFR  (AFRICAN AMERICAN): 14 ML/MIN/1.73 M^2
EST. GFR  (NON AFRICAN AMERICAN): 12 ML/MIN/1.73 M^2
GLUCOSE SERPL-MCNC: 98 MG/DL
GLUCOSE UR QL STRIP: NEGATIVE
GLUCOSE UR QL STRIP: NEGATIVE
HCT VFR BLD AUTO: 26.4 %
HGB BLD-MCNC: 8.3 G/DL
HGB UR QL STRIP: ABNORMAL
HGB UR QL STRIP: ABNORMAL
HYALINE CASTS #/AREA URNS LPF: 0 /LPF
KETONES UR QL STRIP: NEGATIVE
KETONES UR QL STRIP: NEGATIVE
LEUKOCYTE ESTERASE UR QL STRIP: ABNORMAL
LEUKOCYTE ESTERASE UR QL STRIP: ABNORMAL
LYMPHOCYTES # BLD AUTO: 1.8 K/UL
LYMPHOCYTES NFR BLD: 22.5 %
MAGNESIUM SERPL-MCNC: 1.9 MG/DL
MCH RBC QN AUTO: 27.2 PG
MCHC RBC AUTO-ENTMCNC: 31.4 G/DL
MCV RBC AUTO: 87 FL
MICROSCOPIC COMMENT: ABNORMAL
MONOCYTES # BLD AUTO: 1 K/UL
MONOCYTES NFR BLD: 12.2 %
NEUTROPHILS # BLD AUTO: 4.6 K/UL
NEUTROPHILS NFR BLD: 56.2 %
NITRITE UR QL STRIP: POSITIVE
NITRITE UR QL STRIP: POSITIVE
PH UR STRIP: 6 [PH] (ref 5–8)
PH UR STRIP: 6 [PH] (ref 5–8)
PHOSPHATE SERPL-MCNC: 4.9 MG/DL
PHOSPHATE SERPL-MCNC: 4.9 MG/DL
PLATELET # BLD AUTO: 227 K/UL
PMV BLD AUTO: 10.1 FL
POTASSIUM SERPL-SCNC: 4 MMOL/L
PROCALCITONIN SERPL IA-MCNC: 0.13 NG/ML
PROT UR QL STRIP: ABNORMAL
PROT UR QL STRIP: ABNORMAL
PROT UR-MCNC: 148 MG/DL
PROT/CREAT RATIO, UR: 2.22
RBC # BLD AUTO: 3.05 M/UL
RBC #/AREA URNS HPF: 5 /HPF (ref 0–4)
SODIUM SERPL-SCNC: 140 MMOL/L
SODIUM UR-SCNC: 72 MMOL/L
SP GR UR STRIP: 1.02 (ref 1–1.03)
SP GR UR STRIP: 1.02 (ref 1–1.03)
URN SPEC COLLECT METH UR: ABNORMAL
URN SPEC COLLECT METH UR: ABNORMAL
UROBILINOGEN UR STRIP-ACNC: NEGATIVE EU/DL
UROBILINOGEN UR STRIP-ACNC: NEGATIVE EU/DL
WBC # BLD AUTO: 8.14 K/UL
WBC #/AREA URNS HPF: >100 /HPF (ref 0–5)

## 2018-06-27 PROCEDURE — 36415 COLL VENOUS BLD VENIPUNCTURE: CPT

## 2018-06-27 PROCEDURE — 11000001 HC ACUTE MED/SURG PRIVATE ROOM

## 2018-06-27 PROCEDURE — 82570 ASSAY OF URINE CREATININE: CPT

## 2018-06-27 PROCEDURE — 25000003 PHARM REV CODE 250: Performed by: HOSPITALIST

## 2018-06-27 PROCEDURE — 85025 COMPLETE CBC W/AUTO DIFF WBC: CPT

## 2018-06-27 PROCEDURE — 83735 ASSAY OF MAGNESIUM: CPT

## 2018-06-27 PROCEDURE — 21400001 HC TELEMETRY ROOM

## 2018-06-27 PROCEDURE — 84300 ASSAY OF URINE SODIUM: CPT

## 2018-06-27 PROCEDURE — 87088 URINE BACTERIA CULTURE: CPT

## 2018-06-27 PROCEDURE — 80069 RENAL FUNCTION PANEL: CPT

## 2018-06-27 PROCEDURE — 99233 SBSQ HOSP IP/OBS HIGH 50: CPT | Mod: ,,, | Performed by: INTERNAL MEDICINE

## 2018-06-27 PROCEDURE — 87077 CULTURE AEROBIC IDENTIFY: CPT

## 2018-06-27 PROCEDURE — 84145 PROCALCITONIN (PCT): CPT

## 2018-06-27 PROCEDURE — 99222 1ST HOSP IP/OBS MODERATE 55: CPT | Mod: ,,, | Performed by: INTERNAL MEDICINE

## 2018-06-27 PROCEDURE — 84156 ASSAY OF PROTEIN URINE: CPT

## 2018-06-27 PROCEDURE — 87186 SC STD MICRODIL/AGAR DIL: CPT

## 2018-06-27 PROCEDURE — 63600175 PHARM REV CODE 636 W HCPCS: Performed by: NURSE PRACTITIONER

## 2018-06-27 PROCEDURE — 81000 URINALYSIS NONAUTO W/SCOPE: CPT

## 2018-06-27 PROCEDURE — 87086 URINE CULTURE/COLONY COUNT: CPT

## 2018-06-27 RX ADMIN — APIXABAN 2.5 MG: 2.5 TABLET, FILM COATED ORAL at 08:06

## 2018-06-27 RX ADMIN — CEFTRIAXONE 1 G: 1 INJECTION, SOLUTION INTRAVENOUS at 03:06

## 2018-06-27 RX ADMIN — ASPIRIN 81 MG 81 MG: 81 TABLET ORAL at 08:06

## 2018-06-27 RX ADMIN — CARVEDILOL 25 MG: 12.5 TABLET, FILM COATED ORAL at 08:06

## 2018-06-27 RX ADMIN — ATORVASTATIN CALCIUM 40 MG: 10 TABLET, FILM COATED ORAL at 08:06

## 2018-06-27 RX ADMIN — APIXABAN 2.5 MG: 2.5 TABLET, FILM COATED ORAL at 09:06

## 2018-06-27 NOTE — PLAN OF CARE
Problem: Patient Care Overview  Goal: Plan of Care Review  Outcome: Ongoing (interventions implemented as appropriate)  IV abx given per MD order. Pt turned q2hr to prevent skin breakdown. Pt oriented only to self. Pt remains free of injury, no s/s of distress. 24 hour chart check completed. Will continue to monitor.

## 2018-06-27 NOTE — ASSESSMENT & PLAN NOTE
Patient has cardiorenal syndrome with very little window of opportunity for improvement.  Patient has been fluctuating with acute kidney injury from over diuresis and congestive heart failure and fluid overload with under diuresis.    Will check urine studies with urine sodium and also check renal ultrasound.  At this time I would stop the diuretic temporarily and follow very closely.

## 2018-06-27 NOTE — HPI
Patient is an 78-year-old female with chronic kidney disease stage 4 has been seen by Dr. Ramirez in the Nephrology Clinic patient has had multiple episodes of acute kidney injury with chronic kidney disease stage 4 with creatinine fluctuating between 2 and 3.0.  The patient has been fluid overloaded as well as had acute kidney injury from over diuresis.  Patient has been readmitted with acute kidney injury creatinine greater than 4 and a Renal consultation is requested.  Patient has been on Lasix at home.  Patient has not been eating very well.  Patient seems to have some swelling in the legs which is better than before.

## 2018-06-27 NOTE — PROGRESS NOTES
Patient seen and examined. Patient's son asked for an update. Called him at home. Dr. Rodriguez ordered u/s kidney which showed small kidneys. Urine studies ordered. UTI on IV Rocephin. ppt

## 2018-06-27 NOTE — SUBJECTIVE & OBJECTIVE
Interval clinical history- Overnight clinical events reviewed.  Patient reports that she feels fine.  History is unreliable because of underlying dementia.    Oncology Treatment Plan:   [No treatment plan]    Medications:  Continuous Infusions:  Scheduled Meds:   apixaban  2.5 mg Oral BID    aspirin  81 mg Oral Daily    atorvastatin  40 mg Oral Daily    carvedilol  25 mg Oral Daily    cefTRIAXone (ROCEPHIN) IVPB  1 g Intravenous Q24H    levothyroxine  75 mcg Oral Before breakfast     PRN Meds:sodium chloride, acetaminophen, dextrose 50%, dextrose 50%, glucagon (human recombinant), glucose, glucose, hydrALAZINE, sodium chloride 0.9%     Review of patient's allergies indicates:   Allergen Reactions    Codeine      Does not remember side effects    Corticosteroids (glucocorticoids)     Diovan hct [valsartan-hydrochlorothiazide]      May drowsy    Hydrocodone      Itching, nausea and vomiting.    Indomethacin     Iodine and iodide containing products      Rash and swelling    Lortab [hydrocodone-acetaminophen]     Omnicef [cefdinir]      Rash/itching    Oxycodone      Itching    Penicillins      Vaginal yeast infection        Past Medical History:   Diagnosis Date    Anemia     CAD (coronary artery disease)     Chronic back pain     Fibromyalgia     H/O aortic valve replacement 2010    History of breast cancer 1994    Right Lumpectomy    Hyperlipemia     Hypothyroid     Osteoarthritis     PVD (peripheral vascular disease)     Renal failure     Rheumatoid arteritis     S/P CABG (coronary artery bypass graft) 91,10    Syncope and collapse 07/2016    Vitamin D deficiency      Past Surgical History:   Procedure Laterality Date    ANKLE FRACTURE SURGERY      right foot    AORTIC VALVE REPLACEMENT  2010    APPENDECTOMY      APPENDECTOMY      BREAST LUMPECTOMY Right 1994    CORONARY ARTERY BYPASS GRAFT      HYSTERECTOMY      TONSILLECTOMY       Family History     None        Social  History Main Topics    Smoking status: Never Smoker    Smokeless tobacco: Never Used    Alcohol use No    Drug use: No    Sexual activity: Not Currently       Review of Systems   Unable to perform ROS: Dementia     Objective:     Vital Signs (Most Recent):  Temp: 98.6 °F (37 °C) (06/27/18 1615)  Pulse: 74 (06/27/18 1615)  Resp: 17 (06/27/18 1615)  BP: (!) 147/67 (06/27/18 1615)  SpO2: (!) 91 % (06/27/18 1615) Vital Signs (24h Range):  Temp:  [98.1 °F (36.7 °C)-98.6 °F (37 °C)] 98.6 °F (37 °C)  Pulse:  [65-82] 74  Resp:  [16-73] 17  SpO2:  [91 %-96 %] 91 %  BP: (133-175)/(63-84) 147/67     Weight: 68.4 kg (150 lb 12.8 oz)  Body mass index is 27.58 kg/m².  Body surface area is 1.73 meters squared.      Intake/Output Summary (Last 24 hours) at 06/27/18 1805  Last data filed at 06/27/18 1700   Gross per 24 hour   Intake              530 ml   Output                0 ml   Net              530 ml       Physical Exam   Constitutional: She is oriented to person, place, and time. She appears well-developed and well-nourished. She is active and cooperative.   HENT:   Head: Normocephalic and atraumatic.   Nose: Nose normal.   Mouth/Throat: Oropharynx is clear and moist. No oropharyngeal exudate.   Eyes: Pupils are equal, round, and reactive to light. No scleral icterus.   Neck: Neck supple. No thyromegaly present.   Cardiovascular: Normal rate, regular rhythm, normal heart sounds and intact distal pulses.    No murmur heard.  Pulmonary/Chest: Effort normal and breath sounds normal. No stridor. No respiratory distress. She has no wheezes. She has no rales.   Abdominal: Soft. Bowel sounds are normal. She exhibits no distension, no ascites and no mass. There is no hepatosplenomegaly. There is no tenderness. There is no rigidity, no rebound and no guarding.   Musculoskeletal: She exhibits edema. She exhibits no tenderness.   Lymphadenopathy:     She has no cervical adenopathy.   Neurological: She is alert and oriented to  person, place, and time. No cranial nerve deficit. She exhibits normal muscle tone. Coordination normal.   Skin: Skin is warm and dry. No rash noted. No pallor.   Psychiatric: She has a normal mood and affect. Her speech is normal. Judgment and thought content normal. She is slowed and withdrawn. Cognition and memory are impaired. She exhibits abnormal recent memory.   Nursing note and vitals reviewed.      Significant Labs:   I have reviewed all of the patient's relevant lab work available in the medical record and have utilized this in my evaluation and management recommendations today    Diagnostic Results:  I have reviewed all of the patient's diagnostic/imaging results available in the medical record and have utilized this in my evaluation and management recommendations today.

## 2018-06-27 NOTE — ASSESSMENT & PLAN NOTE
Baseline prior to 3/2018 was 1.5-2.0: Admit 4.3  -nephrology consulted and recommended U/s kidney and urine studies   Check BNP   Monitor   -cause for anemia

## 2018-06-27 NOTE — CONSULTS
[facility] -   Nephrology  Consult Note        Patient Name: Anu Blanco  MRN: [id number]  Admission Date: 6/25/2018  Hospital Length of Stay: 1 days  Attending Provider: Alda Hanson MD   Primary Care Physician: Primary Doctor No  Principal Problem:Acute renal failure superimposed on chronic kidney disease    Consults  Subjective:     HPI: Patient is an 78-year-old female with chronic kidney disease stage 4 has been seen by Dr. Ramirez in the Nephrology Clinic patient has had multiple episodes of acute kidney injury with chronic kidney disease stage 4 with creatinine fluctuating between 2 and 3.0.  The patient has been fluid overloaded as well as had acute kidney injury from over diuresis.  Patient has been readmitted with acute kidney injury creatinine greater than 4 and a Renal consultation is requested.  Patient has been on Lasix at home.  Patient has not been eating very well.  Patient seems to have some swelling in the legs which is better than before.    Past Medical History:   Diagnosis Date    Anemia     CAD (coronary artery disease)     Chronic back pain     Fibromyalgia     H/O aortic valve replacement 2010    History of breast cancer 1994    Right Lumpectomy    Hyperlipemia     Hypothyroid     Osteoarthritis     PVD (peripheral vascular disease)     Renal failure     Rheumatoid arteritis     S/P CABG (coronary artery bypass graft) 91,10    Syncope and collapse 07/2016    Vitamin D deficiency        Past Surgical History:   Procedure Laterality Date    ANKLE FRACTURE SURGERY      right foot    AORTIC VALVE REPLACEMENT  2010    APPENDECTOMY      APPENDECTOMY      BREAST LUMPECTOMY Right 1994    CORONARY ARTERY BYPASS GRAFT      HYSTERECTOMY      TONSILLECTOMY         Review of patient's allergies indicates:   Allergen Reactions    Codeine      Does not remember side effects    Corticosteroids (glucocorticoids)     Diovan hct [valsartan-hydrochlorothiazide]       May drowsy    Hydrocodone      Itching, nausea and vomiting.    Indomethacin     Iodine and iodide containing products      Rash and swelling    Lortab [hydrocodone-acetaminophen]     Omnicef [cefdinir]      Rash/itching    Oxycodone      Itching    Penicillins      Vaginal yeast infection     Current Facility-Administered Medications   Medication Frequency    0.9%  NaCl infusion (for blood administration) Q24H PRN    acetaminophen tablet 650 mg Q4H PRN    apixaban tablet 2.5 mg BID    aspirin chewable tablet 81 mg Daily    atorvastatin tablet 40 mg Daily    carvedilol tablet 25 mg Daily    cefTRIAXone (ROCEPHIN) 1 g in dextrose 5 % 50 mL IVPB Q24H    dextrose 50% injection 12.5 g PRN    dextrose 50% injection 25 g PRN    glucagon (human recombinant) injection 1 mg PRN    glucose chewable tablet 16 g PRN    glucose chewable tablet 24 g PRN    hydrALAZINE injection 5 mg Q8H PRN    levothyroxine tablet 75 mcg Before breakfast    sodium chloride 0.9% flush 5 mL PRN     Family History     None        Social History Main Topics    Smoking status: Never Smoker    Smokeless tobacco: Never Used    Alcohol use No    Drug use: No    Sexual activity: Not Currently     Review of Systems   Constitutional: Positive for activity change, appetite change, fatigue and unexpected weight change. Negative for chills, diaphoresis and fever.   HENT: Negative for congestion, dental problem, drooling, postnasal drip, rhinorrhea and voice change.    Eyes: Negative for discharge.   Respiratory: Positive for shortness of breath. Negative for apnea, cough, choking, chest tightness, wheezing and stridor.    Cardiovascular: Positive for leg swelling. Negative for chest pain and palpitations.   Gastrointestinal: Negative for abdominal distention, blood in stool, constipation, diarrhea, nausea, rectal pain and vomiting.   Endocrine: Negative for cold intolerance, heat intolerance, polydipsia and polyuria.   Genitourinary:  Negative for decreased urine volume, difficulty urinating, dysuria, enuresis, flank pain, frequency, hematuria and urgency.   Musculoskeletal: Negative for arthralgias, back pain, gait problem and joint swelling.   Skin: Negative for rash.   Allergic/Immunologic: Negative for food allergies and immunocompromised state.   Neurological: Negative for dizziness, tremors, syncope, numbness and headaches.   Hematological: Does not bruise/bleed easily.   Psychiatric/Behavioral: Negative for agitation, behavioral problems and self-injury. The patient is not nervous/anxious and is not hyperactive.    All other systems reviewed and are negative.    Objective:     Vital Signs (Most Recent):  Temp: 98.5 °F (36.9 °C) (06/27/18 1159)  Pulse: 65 (06/27/18 1159)  Resp: 18 (06/27/18 1159)  BP: 133/63 (06/27/18 1159)  SpO2: (!) 92 % (06/27/18 1159)  O2 Device (Oxygen Therapy): room air (06/27/18 1159) Vital Signs (24h Range):  Temp:  [98.1 °F (36.7 °C)-98.7 °F (37.1 °C)] 98.5 °F (36.9 °C)  Pulse:  [65-82] 65  Resp:  [16-73] 18  SpO2:  [92 %-96 %] 92 %  BP: (133-175)/(63-84) 133/63     Weight: 68.4 kg (150 lb 12.8 oz) (06/26/18 0034)  Body mass index is 27.58 kg/m².  Body surface area is 1.73 meters squared.    I/O last 3 completed shifts:  In: 2308.3 [P.O.:720; I.V.:1038.3; Blood:250; IV Piggyback:300]  Out: -     Physical Exam   Constitutional: She is oriented to person, place, and time. She appears well-developed and well-nourished.   HENT:   Head: Normocephalic and atraumatic.   Eyes: Conjunctivae and EOM are normal. Pupils are equal, round, and reactive to light.   Neck: Normal range of motion and full passive range of motion without pain. Neck supple. Carotid bruit is not present. No edema present. No thyroid mass and no thyromegaly present.   Cardiovascular: Normal rate, regular rhythm, S1 normal, S2 normal, intact distal pulses and normal pulses.   No extrasystoles are present. PMI is displaced.  Exam reveals no friction rub.     Murmur heard.   Systolic murmur is present with a grade of 2/6   RV heave loud P2    Pulmonary/Chest: Effort normal. No accessory muscle usage. No respiratory distress. She has no wheezes. She has no rales. She exhibits no tenderness.   Abdominal: Soft. Bowel sounds are normal. She exhibits no distension and no mass. There is no hepatosplenomegaly. There is no tenderness. There is no rebound and no CVA tenderness. No hernia.   Musculoskeletal: Normal range of motion. She exhibits edema. She exhibits no tenderness.   Neurological: She is alert and oriented to person, place, and time. She has normal reflexes. No cranial nerve deficit or sensory deficit. She exhibits normal muscle tone. Coordination normal.   Skin: Skin is warm and dry. No bruising, no ecchymosis and no rash noted. No cyanosis or erythema. No pallor. Nails show no clubbing.   Psychiatric: She has a normal mood and affect. Her speech is normal and behavior is normal. Judgment and thought content normal.       Significant Labs:  All labs within the past 24 hours have been reviewed.    Significant Imaging:  Labs: Reviewed  X-Ray: Reviewed    Assessment/Plan:     * Acute renal failure superimposed on chronic kidney disease    Patient has cardiorenal syndrome with very little window of opportunity for improvement.  Patient has been fluctuating with acute kidney injury from over diuresis and congestive heart failure and fluid overload with under diuresis.    Will check urine studies with urine sodium and also check renal ultrasound.  At this time I would stop the diuretic temporarily and follow very closely.        Cardiorenal syndrome, stage 1-4 or unspecified chronic kidney disease, with heart failure    At this point I would hold the Lasix for the next 24 hr.  Encourage p.o. fluids and check renal ultrasound    Recheck BMP and BNP            Thank you for your consult.     Yair Rodriguez MD   Nephrology  [facility] -

## 2018-06-27 NOTE — ASSESSMENT & PLAN NOTE
H/H dropped overnight   Recently placed on Eliquis  Pt had antibodies but Transfused one unit PRBC 6/26/18  Monitor H/H in am   Dr. Muller recommended outpatient IV iron infusions and Procrit after iron repletion.

## 2018-06-27 NOTE — PLAN OF CARE
Problem: Patient Care Overview  Goal: Plan of Care Review  Outcome: Ongoing (interventions implemented as appropriate)  Plan of care reviewed with patient; verbalized understanding. Fall precautions maintained, patient remains free from injury. Denies pain. Medications being administered as ordered. Vital signs stable with no signs of distress noted. Bed low and locked with call light in reach. Will continue monitor.     In and out cath performed per Dr. Rodriguez's order to obtain urine sample.

## 2018-06-27 NOTE — SUBJECTIVE & OBJECTIVE
Interval History: Received 1 unit blood overnight. Dr. Rodriguez (Ckd IV) and Dr. Muller consulted (Chronic anemia).    Review of Systems   Unable to perform ROS: Dementia   Constitutional: Positive for activity change, appetite change, fatigue and unexpected weight change. Negative for chills, diaphoresis and fever.   HENT: Negative for congestion, dental problem, drooling, postnasal drip, rhinorrhea and voice change.    Eyes: Negative for discharge.   Respiratory: Positive for shortness of breath. Negative for apnea, cough, choking, chest tightness, wheezing and stridor.    Cardiovascular: Positive for leg swelling. Negative for chest pain and palpitations.   Gastrointestinal: Negative for abdominal distention, blood in stool, constipation, diarrhea, nausea, rectal pain and vomiting.   Endocrine: Negative for cold intolerance, heat intolerance, polydipsia and polyuria.   Genitourinary: Negative for decreased urine volume, difficulty urinating, dysuria, enuresis, flank pain, frequency, hematuria and urgency.   Musculoskeletal: Negative for arthralgias, back pain, gait problem and joint swelling.   Skin: Negative for rash.   Allergic/Immunologic: Negative for food allergies and immunocompromised state.   Neurological: Negative for dizziness, tremors, syncope, numbness and headaches.   Hematological: Does not bruise/bleed easily.   Psychiatric/Behavioral: Negative for agitation, behavioral problems and self-injury. The patient is not nervous/anxious and is not hyperactive.    All other systems reviewed and are negative.    Objective:     Vital Signs (Most Recent):  Temp: 98.6 °F (37 °C) (06/27/18 1615)  Pulse: 74 (06/27/18 1615)  Resp: 17 (06/27/18 1615)  BP: (!) 147/67 (06/27/18 1615)  SpO2: (!) 91 % (06/27/18 1615) Vital Signs (24h Range):  Temp:  [98.1 °F (36.7 °C)-98.6 °F (37 °C)] 98.6 °F (37 °C)  Pulse:  [65-82] 74  Resp:  [16-73] 17  SpO2:  [91 %-96 %] 91 %  BP: (133-175)/(63-84) 147/67     Weight: 68.4 kg (150  lb 12.8 oz)  Body mass index is 27.58 kg/m².    Intake/Output Summary (Last 24 hours) at 06/27/18 1820  Last data filed at 06/27/18 1700   Gross per 24 hour   Intake              530 ml   Output                0 ml   Net              530 ml      Physical Exam   Constitutional: She appears well-developed and well-nourished. No distress.   HENT:   Head: Normocephalic and atraumatic.   Nose: Nose normal.   Mouth/Throat: Oropharynx is clear and moist.   Eyes: Conjunctivae and EOM are normal. No scleral icterus.   Neck: Normal range of motion. Neck supple. No tracheal deviation present.   Cardiovascular: Normal rate, regular rhythm and intact distal pulses.  Exam reveals no gallop and no friction rub.    Murmur heard.  Pulmonary/Chest: Effort normal and breath sounds normal. No stridor. No respiratory distress. She has no wheezes. She has no rales. She exhibits no tenderness.   Abdominal: Soft. Bowel sounds are normal. She exhibits no distension and no mass. There is no tenderness. There is no rebound and no guarding.   Musculoskeletal: Normal range of motion. She exhibits edema (+2-3 BLE edema with chronic PVD changes to BLE ). She exhibits no tenderness or deformity.   Neurological: She is alert. No cranial nerve deficit. She exhibits normal muscle tone. Coordination normal.   Awake alert, oriented to name only    Skin: Skin is warm and dry. No rash noted. She is not diaphoretic. No erythema. No pallor.   Psychiatric: She has a normal mood and affect. Her behavior is normal. Thought content normal.   Nursing note and vitals reviewed.    Significant Labs:   CBC:   Recent Labs  Lab 06/25/18 2045 06/26/18 0528 06/27/18 0427   WBC 9.00 7.13 8.14   HGB 7.7* 6.7* 8.3*   HCT 25.3* 22.8* 26.4*    216 227     CMP:   Recent Labs  Lab 06/25/18 2045 06/26/18 0528 06/27/18  0427    139 140   K 4.0 3.8 4.0    109 108   CO2 22* 21* 21*    109 98   BUN 65* 64* 57*   CREATININE 4.3* 4.0* 3.5*   CALCIUM  9.0 8.3* 8.9   PROT 7.0  --   --    ALBUMIN 2.8* 2.3* 2.5*   BILITOT 0.3  --   --    ALKPHOS 79  --   --    AST 8*  --   --    ALT 5*  --   --    ANIONGAP 14 9 11   EGFRNONAA 9* 10* 12*     Cardiac Markers:   Recent Labs  Lab 06/26/18  1651   *       Significant Imaging: I have reviewed all pertinent imaging results/findings within the past 24 hours.

## 2018-06-27 NOTE — SUBJECTIVE & OBJECTIVE
Past Medical History:   Diagnosis Date    Anemia     CAD (coronary artery disease)     Chronic back pain     Fibromyalgia     H/O aortic valve replacement 2010    History of breast cancer 1994    Right Lumpectomy    Hyperlipemia     Hypothyroid     Osteoarthritis     PVD (peripheral vascular disease)     Renal failure     Rheumatoid arteritis     S/P CABG (coronary artery bypass graft) 91,10    Syncope and collapse 07/2016    Vitamin D deficiency        Past Surgical History:   Procedure Laterality Date    ANKLE FRACTURE SURGERY      right foot    AORTIC VALVE REPLACEMENT  2010    APPENDECTOMY      APPENDECTOMY      BREAST LUMPECTOMY Right 1994    CORONARY ARTERY BYPASS GRAFT      HYSTERECTOMY      TONSILLECTOMY         Review of patient's allergies indicates:   Allergen Reactions    Codeine      Does not remember side effects    Corticosteroids (glucocorticoids)     Diovan hct [valsartan-hydrochlorothiazide]      May drowsy    Hydrocodone      Itching, nausea and vomiting.    Indomethacin     Iodine and iodide containing products      Rash and swelling    Lortab [hydrocodone-acetaminophen]     Omnicef [cefdinir]      Rash/itching    Oxycodone      Itching    Penicillins      Vaginal yeast infection     Current Facility-Administered Medications   Medication Frequency    0.9%  NaCl infusion (for blood administration) Q24H PRN    acetaminophen tablet 650 mg Q4H PRN    apixaban tablet 2.5 mg BID    aspirin chewable tablet 81 mg Daily    atorvastatin tablet 40 mg Daily    carvedilol tablet 25 mg Daily    cefTRIAXone (ROCEPHIN) 1 g in dextrose 5 % 50 mL IVPB Q24H    dextrose 50% injection 12.5 g PRN    dextrose 50% injection 25 g PRN    glucagon (human recombinant) injection 1 mg PRN    glucose chewable tablet 16 g PRN    glucose chewable tablet 24 g PRN    hydrALAZINE injection 5 mg Q8H PRN    levothyroxine tablet 75 mcg Before breakfast    sodium chloride 0.9% flush 5 mL  PRN     Family History     None        Social History Main Topics    Smoking status: Never Smoker    Smokeless tobacco: Never Used    Alcohol use No    Drug use: No    Sexual activity: Not Currently     Review of Systems   Constitutional: Positive for activity change, appetite change, fatigue and unexpected weight change. Negative for chills, diaphoresis and fever.   HENT: Negative for congestion, dental problem, drooling, postnasal drip, rhinorrhea and voice change.    Eyes: Negative for discharge.   Respiratory: Positive for shortness of breath. Negative for apnea, cough, choking, chest tightness, wheezing and stridor.    Cardiovascular: Positive for leg swelling. Negative for chest pain and palpitations.   Gastrointestinal: Negative for abdominal distention, blood in stool, constipation, diarrhea, nausea, rectal pain and vomiting.   Endocrine: Negative for cold intolerance, heat intolerance, polydipsia and polyuria.   Genitourinary: Negative for decreased urine volume, difficulty urinating, dysuria, enuresis, flank pain, frequency, hematuria and urgency.   Musculoskeletal: Negative for arthralgias, back pain, gait problem and joint swelling.   Skin: Negative for rash.   Allergic/Immunologic: Negative for food allergies and immunocompromised state.   Neurological: Negative for dizziness, tremors, syncope, numbness and headaches.   Hematological: Does not bruise/bleed easily.   Psychiatric/Behavioral: Negative for agitation, behavioral problems and self-injury. The patient is not nervous/anxious and is not hyperactive.    All other systems reviewed and are negative.    Objective:     Vital Signs (Most Recent):  Temp: 98.5 °F (36.9 °C) (06/27/18 1159)  Pulse: 65 (06/27/18 1159)  Resp: 18 (06/27/18 1159)  BP: 133/63 (06/27/18 1159)  SpO2: (!) 92 % (06/27/18 1159)  O2 Device (Oxygen Therapy): room air (06/27/18 1159) Vital Signs (24h Range):  Temp:  [98.1 °F (36.7 °C)-98.7 °F (37.1 °C)] 98.5 °F (36.9 °C)  Pulse:   [65-82] 65  Resp:  [16-73] 18  SpO2:  [92 %-96 %] 92 %  BP: (133-175)/(63-84) 133/63     Weight: 68.4 kg (150 lb 12.8 oz) (06/26/18 0034)  Body mass index is 27.58 kg/m².  Body surface area is 1.73 meters squared.    I/O last 3 completed shifts:  In: 2308.3 [P.O.:720; I.V.:1038.3; Blood:250; IV Piggyback:300]  Out: -     Physical Exam   Constitutional: She is oriented to person, place, and time. She appears well-developed and well-nourished.   HENT:   Head: Normocephalic and atraumatic.   Eyes: Conjunctivae and EOM are normal. Pupils are equal, round, and reactive to light.   Neck: Normal range of motion and full passive range of motion without pain. Neck supple. Carotid bruit is not present. No edema present. No thyroid mass and no thyromegaly present.   Cardiovascular: Normal rate, regular rhythm, S1 normal, S2 normal, intact distal pulses and normal pulses.   No extrasystoles are present. PMI is displaced.  Exam reveals no friction rub.    Murmur heard.   Systolic murmur is present with a grade of 2/6   RV heave loud P2    Pulmonary/Chest: Effort normal. No accessory muscle usage. No respiratory distress. She has no wheezes. She has no rales. She exhibits no tenderness.   Abdominal: Soft. Bowel sounds are normal. She exhibits no distension and no mass. There is no hepatosplenomegaly. There is no tenderness. There is no rebound and no CVA tenderness. No hernia.   Musculoskeletal: Normal range of motion. She exhibits edema. She exhibits no tenderness.   Neurological: She is alert and oriented to person, place, and time. She has normal reflexes. No cranial nerve deficit or sensory deficit. She exhibits normal muscle tone. Coordination normal.   Skin: Skin is warm and dry. No bruising, no ecchymosis and no rash noted. No cyanosis or erythema. No pallor. Nails show no clubbing.   Psychiatric: She has a normal mood and affect. Her speech is normal and behavior is normal. Judgment and thought content normal.        Significant Labs:  All labs within the past 24 hours have been reviewed.    Significant Imaging:  Labs: Reviewed  X-Ray: Reviewed

## 2018-06-27 NOTE — ASSESSMENT & PLAN NOTE
At this point I would hold the Lasix for the next 24 hr.  Encourage p.o. fluids and check renal ultrasound    Recheck BMP and BNP

## 2018-06-27 NOTE — PLAN OF CARE
BENEDICT spoke with the patient and she is aware she is to return to [facility], [place], operated by [facility]. CM to . for safe transition

## 2018-06-27 NOTE — ASSESSMENT & PLAN NOTE
June 26, 2018- I have recommended transfusion of 1 unit of PRBCs for treatment of her symptomatic anemia.  We will continue to monitor her hemoglobin/hematocrit closely.  The patient will be given IV iron infusions as an outpatient for her iron deficiency.  She will also start Procrit injections as an outpatient for treatment of her anemia.  I will continue follow the patient with you and assist with the patient's management as needed.  Please call with any any questions.  June 27, 2018-review of CBC from today shows interval improvement in hemoglobin/hematocrit after transfusion of PRBCs overnight.  No indication for additional PRBC transfusion at this time.  She will be given IV iron infusions and Procrit after iron repletion as an outpatient.  I will continue to follow the patient with you and assist with the patient's management as needed.  Please call with any questions.

## 2018-06-27 NOTE — PROGRESS NOTES
[facility] Hospital Medicine  Progress Note    Patient Name: Anu Blanco  MRN: [id number]  Patient Class: IP- Inpatient   Admission Date: 6/25/2018  Length of Stay: 1 days  Attending Physician: Alda Hanson MD  Primary Care Provider: Primary Doctor No    Subjective:     Principal Problem:Acute renal failure superimposed on chronic kidney disease    HPI:  78F h/o CKD stage 4, HTN, chronic anemia, CAD, and hypothyroidism presents from hem/onc clinic with elevated creatinine, sx of dysuria, possible need for blood transfusion.     Patient reports BLE edema. Has decreased fluid intake.  Also c/o fatigue.  NO other associated symptoms.  Patient denies sob, chest pain, HA, fevers, chills, n/v, c/d.  In ER, VSS.  UA ordered stat.  Labs show Hb 7.7, Cr 4.3.  Hospital medicine called for admission.   Patient's baseline Cr 2-3.  Last seen in renal clinic on 6/1, noted to have elevated creatinine 3.4 from NH at the time.  Taking lasix 40mg po daily.     Hospital Course:  The pt is a resident of [facility] who was sent to hospital by heme/Onc and placed in observation for ARF/CKD3, UTI, and acute on chronic anemia. As per Dr. Muller, the pt reportedly had a +urinalysis at [facility] home PTA. Baseline serum Cr 1.8- 2.0. On arrival to ED, serum Cr 4.3. Pt also has worsening anemia with Hgb 7.7>6.7. Pt has antibodies and did not receive PRBC until today.   Will admit pt, consult Nephrology, and recheck Hgb in am. Rocephin IV ordered. Urinalysis and urine culture pending. Blood cultures show NGTD. Pt was recently admitted with Afib with RVR and CHF exac. Serum Cr was increasing, so Lasix was discontinued. Pt has +3 BLE edema. Cardiac echo 3/2018 showed normal LVEF, diastolic dysfunction, multiple WMAs, and moderate MR. Will check BMP, daily weights, and I/O   6/27/18 - Creatinine 3.5. Dr. Rodriguez ordered kidney u/s, urine studies, no diuretics. No Family in room.Transfused with 1 unit PRBC.  Dr. Muller consulted for chronic anemia. He recommended IV iron infusions and Procrit after iron repletion as an outpatient.     Interval History: Received 1 unit blood overnight. Dr. Rodriguez (Ckd IV) and Dr. Muller consulted (Chronic anemia).    Review of Systems   Unable to perform ROS: Dementia   Constitutional: Positive for activity change, appetite change, fatigue and unexpected weight change. Negative for chills, diaphoresis and fever.   HENT: Negative for congestion, dental problem, drooling, postnasal drip, rhinorrhea and voice change.    Eyes: Negative for discharge.   Respiratory: Positive for shortness of breath. Negative for apnea, cough, choking, chest tightness, wheezing and stridor.    Cardiovascular: Positive for leg swelling. Negative for chest pain and palpitations.   Gastrointestinal: Negative for abdominal distention, blood in stool, constipation, diarrhea, nausea, rectal pain and vomiting.   Endocrine: Negative for cold intolerance, heat intolerance, polydipsia and polyuria.   Genitourinary: Negative for decreased urine volume, difficulty urinating, dysuria, enuresis, flank pain, frequency, hematuria and urgency.   Musculoskeletal: Negative for arthralgias, back pain, gait problem and joint swelling.   Skin: Negative for rash.   Allergic/Immunologic: Negative for food allergies and immunocompromised state.   Neurological: Negative for dizziness, tremors, syncope, numbness and headaches.   Hematological: Does not bruise/bleed easily.   Psychiatric/Behavioral: Negative for agitation, behavioral problems and self-injury. The patient is not nervous/anxious and is not hyperactive.    All other systems reviewed and are negative.    Objective:     Vital Signs (Most Recent):  Temp: 98.6 °F (37 °C) (06/27/18 1615)  Pulse: 74 (06/27/18 1615)  Resp: 17 (06/27/18 1615)  BP: (!) 147/67 (06/27/18 1615)  SpO2: (!) 91 % (06/27/18 1615) Vital Signs (24h Range):  Temp:  [98.1 °F (36.7 °C)-98.6 °F (37 °C)] 98.6  °F (37 °C)  Pulse:  [65-82] 74  Resp:  [16-73] 17  SpO2:  [91 %-96 %] 91 %  BP: (133-175)/(63-84) 147/67     Weight: 68.4 kg (150 lb 12.8 oz)  Body mass index is 27.58 kg/m².    Intake/Output Summary (Last 24 hours) at 06/27/18 1820  Last data filed at 06/27/18 1700   Gross per 24 hour   Intake              530 ml   Output                0 ml   Net              530 ml      Physical Exam   Constitutional: She appears well-developed and well-nourished. No distress.   HENT:   Head: Normocephalic and atraumatic.   Nose: Nose normal.   Mouth/Throat: Oropharynx is clear and moist.   Eyes: Conjunctivae and EOM are normal. No scleral icterus.   Neck: Normal range of motion. Neck supple. No tracheal deviation present.   Cardiovascular: Normal rate, regular rhythm and intact distal pulses.  Exam reveals no gallop and no friction rub.    Murmur heard.  Pulmonary/Chest: Effort normal and breath sounds normal. No stridor. No respiratory distress. She has no wheezes. She has no rales. She exhibits no tenderness.   Abdominal: Soft. Bowel sounds are normal. She exhibits no distension and no mass. There is no tenderness. There is no rebound and no guarding.   Musculoskeletal: Normal range of motion. She exhibits edema (+2-3 BLE edema with chronic PVD changes to BLE ). She exhibits no tenderness or deformity.   Neurological: She is alert. No cranial nerve deficit. She exhibits normal muscle tone. Coordination normal.   Awake alert, oriented to name only    Skin: Skin is warm and dry. No rash noted. She is not diaphoretic. No erythema. No pallor.   Psychiatric: She has a normal mood and affect. Her behavior is normal. Thought content normal.   Nursing note and vitals reviewed.    Significant Labs:   CBC:   Recent Labs  Lab 06/25/18 2045 06/26/18 0528 06/27/18  0427   WBC 9.00 7.13 8.14   HGB 7.7* 6.7* 8.3*   HCT 25.3* 22.8* 26.4*    216 227     CMP:   Recent Labs  Lab 06/25/18 2045 06/26/18  0528 06/27/18  0427    139  140   K 4.0 3.8 4.0    109 108   CO2 22* 21* 21*    109 98   BUN 65* 64* 57*   CREATININE 4.3* 4.0* 3.5*   CALCIUM 9.0 8.3* 8.9   PROT 7.0  --   --    ALBUMIN 2.8* 2.3* 2.5*   BILITOT 0.3  --   --    ALKPHOS 79  --   --    AST 8*  --   --    ALT 5*  --   --    ANIONGAP 14 9 11   EGFRNONAA 9* 10* 12*     Cardiac Markers:   Recent Labs  Lab 06/26/18  1651   *       Significant Imaging: I have reviewed all pertinent imaging results/findings within the past 24 hours.    Assessment/Plan:      * Acute renal failure superimposed on chronic kidney disease    Baseline prior to 3/2018 was 1.5-2.0: Admit 4.3  -nephrology consulted and recommended U/s kidney and urine studies   Check BNP   Monitor   -cause for anemia        Anemia due to stage 3 chronic kidney disease    H/H dropped overnight   Recently placed on Eliquis  Pt had antibodies but Transfused one unit PRBC 6/26/18  Monitor H/H in am   Dr. Muller recommended outpatient IV iron infusions and Procrit after iron repletion.         Cardiorenal syndrome, stage 1-4 or unspecified chronic kidney disease, with heart failure    See above          UTI (urinary tract infection)    NH reported UTI  Recheck urinalysis and urine culture  IV Rocephin         Symptomatic anemia    - anemia of chronic disease 2/2 CKD  See above         Atrial fibrillation    - rate controlled  - continue coreg and apixaban          Essential hypertension    - amlodipine stopped per Dr. Smiley (nephrology) due to low bp  - current sbp 130-150s  - continue coreg bid  - add hydralazine 10mg IV q8h prn sbp >180  - follow up nephrology recs          Hypothyroid    continue synthroid          CAD (coronary artery disease)    - continue ASA, coreg, and statin            VTE Risk Mitigation         Ordered     apixaban tablet 2.5 mg  2 times daily      06/25/18 2230        Rosy Lora NP  Department of Hospital Medicine   [facility]

## 2018-06-27 NOTE — PROGRESS NOTES
[facility]  Hematology/Oncology  Progress Note    Patient Name: Anu Blanco  Admission Date: 6/25/2018  Hospital Length of Stay: 1 days  Code Status: Full Code     Subjective:     HPI:  The patient is a 78-year-old  female who is currently admitted to the Internal Medicine team at [facility] [place] for further evaluation and management of acute on chronic renal failure, symptomatic anemia and UTI.  The patient is followed in the outpatient Hematology Oncology Clinic for chronic anemia.  I have reviewed all the patient's relevant clinical history available medical record have utilized as my evaluation management recommendations today.  The patient does have dementia and is a poor historian.    Interval clinical history- Overnight clinical events reviewed.  Patient reports that she feels fine.  History is unreliable because of underlying dementia.    Oncology Treatment Plan:   [No treatment plan]    Medications:  Continuous Infusions:  Scheduled Meds:   apixaban  2.5 mg Oral BID    aspirin  81 mg Oral Daily    atorvastatin  40 mg Oral Daily    carvedilol  25 mg Oral Daily    cefTRIAXone (ROCEPHIN) IVPB  1 g Intravenous Q24H    levothyroxine  75 mcg Oral Before breakfast     PRN Meds:sodium chloride, acetaminophen, dextrose 50%, dextrose 50%, glucagon (human recombinant), glucose, glucose, hydrALAZINE, sodium chloride 0.9%     Review of patient's allergies indicates:   Allergen Reactions    Codeine      Does not remember side effects    Corticosteroids (glucocorticoids)     Diovan hct [valsartan-hydrochlorothiazide]      May drowsy    Hydrocodone      Itching, nausea and vomiting.    Indomethacin     Iodine and iodide containing products      Rash and swelling    Lortab [hydrocodone-acetaminophen]     Omnicef [cefdinir]      Rash/itching    Oxycodone      Itching    Penicillins      Vaginal yeast infection        Past Medical History:   Diagnosis Date    Anemia      CAD (coronary artery disease)     Chronic back pain     Fibromyalgia     H/O aortic valve replacement 2010    History of breast cancer 1994    Right Lumpectomy    Hyperlipemia     Hypothyroid     Osteoarthritis     PVD (peripheral vascular disease)     Renal failure     Rheumatoid arteritis     S/P CABG (coronary artery bypass graft) 91,10    Syncope and collapse 07/2016    Vitamin D deficiency      Past Surgical History:   Procedure Laterality Date    ANKLE FRACTURE SURGERY      right foot    AORTIC VALVE REPLACEMENT  2010    APPENDECTOMY      APPENDECTOMY      BREAST LUMPECTOMY Right 1994    CORONARY ARTERY BYPASS GRAFT      HYSTERECTOMY      TONSILLECTOMY       Family History     None        Social History Main Topics    Smoking status: Never Smoker    Smokeless tobacco: Never Used    Alcohol use No    Drug use: No    Sexual activity: Not Currently       Review of Systems   Unable to perform ROS: Dementia     Objective:     Vital Signs (Most Recent):  Temp: 98.6 °F (37 °C) (06/27/18 1615)  Pulse: 74 (06/27/18 1615)  Resp: 17 (06/27/18 1615)  BP: (!) 147/67 (06/27/18 1615)  SpO2: (!) 91 % (06/27/18 1615) Vital Signs (24h Range):  Temp:  [98.1 °F (36.7 °C)-98.6 °F (37 °C)] 98.6 °F (37 °C)  Pulse:  [65-82] 74  Resp:  [16-73] 17  SpO2:  [91 %-96 %] 91 %  BP: (133-175)/(63-84) 147/67     Weight: 68.4 kg (150 lb 12.8 oz)  Body mass index is 27.58 kg/m².  Body surface area is 1.73 meters squared.      Intake/Output Summary (Last 24 hours) at 06/27/18 1805  Last data filed at 06/27/18 1700   Gross per 24 hour   Intake              530 ml   Output                0 ml   Net              530 ml       Physical Exam   Constitutional: She is oriented to person, place, and time. She appears well-developed and well-nourished. She is active and cooperative.   HENT:   Head: Normocephalic and atraumatic.   Nose: Nose normal.   Mouth/Throat: Oropharynx is clear and moist. No oropharyngeal exudate.    Eyes: Pupils are equal, round, and reactive to light. No scleral icterus.   Neck: Neck supple. No thyromegaly present.   Cardiovascular: Normal rate, regular rhythm, normal heart sounds and intact distal pulses.    No murmur heard.  Pulmonary/Chest: Effort normal and breath sounds normal. No stridor. No respiratory distress. She has no wheezes. She has no rales.   Abdominal: Soft. Bowel sounds are normal. She exhibits no distension, no ascites and no mass. There is no hepatosplenomegaly. There is no tenderness. There is no rigidity, no rebound and no guarding.   Musculoskeletal: She exhibits edema. She exhibits no tenderness.   Lymphadenopathy:     She has no cervical adenopathy.   Neurological: She is alert and oriented to person, place, and time. No cranial nerve deficit. She exhibits normal muscle tone. Coordination normal.   Skin: Skin is warm and dry. No rash noted. No pallor.   Psychiatric: She has a normal mood and affect. Her speech is normal. Judgment and thought content normal. She is slowed and withdrawn. Cognition and memory are impaired. She exhibits abnormal recent memory.   Nursing note and vitals reviewed.      Significant Labs:   I have reviewed all of the patient's relevant lab work available in the medical record and have utilized this in my evaluation and management recommendations today    Diagnostic Results:  I have reviewed all of the patient's diagnostic/imaging results available in the medical record and have utilized this in my evaluation and management recommendations today.    Assessment/Plan:     Anemia due to stage 3 chronic kidney disease    June 26, 2018-the patient will be given IV iron repletion as an outpatient.  After this is completed she will need to start weekly Procrit injections for treatment of this problem.        Symptomatic anemia    June 26, 2018- I have recommended transfusion of 1 unit of PRBCs for treatment of her symptomatic anemia.  We will continue to monitor her  hemoglobin/hematocrit closely.  The patient will be given IV iron infusions as an outpatient for her iron deficiency.  She will also start Procrit injections as an outpatient for treatment of her anemia.  I will continue follow the patient with you and assist with the patient's management as needed.  Please call with any any questions.  June 27, 2018-review of CBC from today shows interval improvement in hemoglobin/hematocrit after transfusion of PRBCs overnight.  No indication for additional PRBC transfusion at this time.  She will be given IV iron infusions and Procrit after iron repletion as an outpatient.  I will continue to follow the patient with you and assist with the patient's management as needed.  Please call with any questions.            Thank you for your consult.      Rudy Muller MD  Hematology/Oncology  [facility]

## 2018-06-28 ENCOUNTER — TELEPHONE (OUTPATIENT)
Dept: NEPHROLOGY | Facility: CLINIC | Age: 78
End: 2018-06-28

## 2018-06-28 ENCOUNTER — TELEPHONE (OUTPATIENT)
Dept: UROLOGY | Facility: CLINIC | Age: 78
End: 2018-06-28

## 2018-06-28 VITALS
DIASTOLIC BLOOD PRESSURE: 67 MMHG | SYSTOLIC BLOOD PRESSURE: 143 MMHG | TEMPERATURE: 99 F | OXYGEN SATURATION: 94 % | HEIGHT: 62 IN | WEIGHT: 150.81 LBS | RESPIRATION RATE: 19 BRPM | BODY MASS INDEX: 27.75 KG/M2 | HEART RATE: 78 BPM

## 2018-06-28 LAB
ALBUMIN SERPL BCP-MCNC: 2.4 G/DL
ANION GAP SERPL CALC-SCNC: 11 MMOL/L
BASOPHILS # BLD AUTO: 0.07 K/UL
BASOPHILS NFR BLD: 0.8 %
BUN SERPL-MCNC: 54 MG/DL
BURR CELLS BLD QL SMEAR: ABNORMAL
CALCIUM SERPL-MCNC: 8.6 MG/DL
CHLORIDE SERPL-SCNC: 107 MMOL/L
CO2 SERPL-SCNC: 20 MMOL/L
CREAT SERPL-MCNC: 3.4 MG/DL
DIFFERENTIAL METHOD: ABNORMAL
EOSINOPHIL # BLD AUTO: 0.8 K/UL
EOSINOPHIL NFR BLD: 9.4 %
ERYTHROCYTE [DISTWIDTH] IN BLOOD BY AUTOMATED COUNT: 19 %
EST. GFR  (AFRICAN AMERICAN): 14 ML/MIN/1.73 M^2
EST. GFR  (NON AFRICAN AMERICAN): 12 ML/MIN/1.73 M^2
GLUCOSE SERPL-MCNC: 94 MG/DL
HCT VFR BLD AUTO: 26.7 %
HGB BLD-MCNC: 8.1 G/DL
LYMPHOCYTES # BLD AUTO: 2 K/UL
LYMPHOCYTES NFR BLD: 23.9 %
MAGNESIUM SERPL-MCNC: 2 MG/DL
MCH RBC QN AUTO: 26.8 PG
MCHC RBC AUTO-ENTMCNC: 30.3 G/DL
MCV RBC AUTO: 88 FL
MONOCYTES # BLD AUTO: 1.1 K/UL
MONOCYTES NFR BLD: 13.7 %
NEUTROPHILS # BLD AUTO: 4.4 K/UL
NEUTROPHILS NFR BLD: 52.3 %
PHOSPHATE SERPL-MCNC: 4.3 MG/DL
PHOSPHATE SERPL-MCNC: 4.3 MG/DL
PLATELET # BLD AUTO: 223 K/UL
PMV BLD AUTO: 10.4 FL
POIKILOCYTOSIS BLD QL SMEAR: SLIGHT
POTASSIUM SERPL-SCNC: 4.1 MMOL/L
RBC # BLD AUTO: 3.02 M/UL
SODIUM SERPL-SCNC: 138 MMOL/L
WBC # BLD AUTO: 8.33 K/UL

## 2018-06-28 PROCEDURE — 25000003 PHARM REV CODE 250: Performed by: HOSPITALIST

## 2018-06-28 PROCEDURE — 85025 COMPLETE CBC W/AUTO DIFF WBC: CPT

## 2018-06-28 PROCEDURE — 80069 RENAL FUNCTION PANEL: CPT

## 2018-06-28 PROCEDURE — 63600175 PHARM REV CODE 636 W HCPCS: Performed by: NURSE PRACTITIONER

## 2018-06-28 PROCEDURE — 99233 SBSQ HOSP IP/OBS HIGH 50: CPT | Mod: ,,, | Performed by: INTERNAL MEDICINE

## 2018-06-28 PROCEDURE — 83735 ASSAY OF MAGNESIUM: CPT

## 2018-06-28 PROCEDURE — 99232 SBSQ HOSP IP/OBS MODERATE 35: CPT | Mod: ,,, | Performed by: INTERNAL MEDICINE

## 2018-06-28 PROCEDURE — 36415 COLL VENOUS BLD VENIPUNCTURE: CPT

## 2018-06-28 RX ORDER — CARVEDILOL 25 MG/1
25 TABLET ORAL 2 TIMES DAILY WITH MEALS
Qty: 60 TABLET | Refills: 1 | Status: SHIPPED | OUTPATIENT
Start: 2018-06-28

## 2018-06-28 RX ORDER — LEVOFLOXACIN 500 MG/1
500 TABLET, FILM COATED ORAL DAILY
Qty: 4 TABLET | Refills: 0 | Status: SHIPPED | OUTPATIENT
Start: 2018-06-29 | End: 2018-07-03

## 2018-06-28 RX ORDER — TORSEMIDE 20 MG/1
20 TABLET ORAL DAILY
Qty: 30 TABLET | Refills: 1 | Status: SHIPPED | OUTPATIENT
Start: 2018-06-28 | End: 2019-06-28

## 2018-06-28 RX ADMIN — APIXABAN 2.5 MG: 2.5 TABLET, FILM COATED ORAL at 10:06

## 2018-06-28 RX ADMIN — CARVEDILOL 25 MG: 12.5 TABLET, FILM COATED ORAL at 10:06

## 2018-06-28 RX ADMIN — ATORVASTATIN CALCIUM 40 MG: 10 TABLET, FILM COATED ORAL at 10:06

## 2018-06-28 RX ADMIN — ASPIRIN 81 MG 81 MG: 81 TABLET ORAL at 10:06

## 2018-06-28 RX ADMIN — LEVOTHYROXINE SODIUM 75 MCG: 25 TABLET ORAL at 05:06

## 2018-06-28 RX ADMIN — CEFTRIAXONE 1 G: 1 INJECTION, SOLUTION INTRAVENOUS at 02:06

## 2018-06-28 NOTE — ASSESSMENT & PLAN NOTE
June 26, 2018- I have recommended transfusion of 1 unit of PRBCs for treatment of her symptomatic anemia.  We will continue to monitor her hemoglobin/hematocrit closely.  The patient will be given IV iron infusions as an outpatient for her iron deficiency.  She will also start Procrit injections as an outpatient for treatment of her anemia.  I will continue follow the patient with you and assist with the patient's management as needed.  Please call with any any questions.  June 27, 2018-review of CBC from today shows interval improvement in hemoglobin/hematocrit after transfusion of PRBCs overnight.  No indication for additional PRBC transfusion at this time.  She will be given IV iron infusions and Procrit after iron repletion as an outpatient.  I will continue to follow the patient with you and assist with the patient's management as needed.  Please call with any questions.  June 28, 2018-review of CBC from today shows interval stability in hemoglobin/hematocrit after transfusion of PRBCs at hospital admission.  No indication for additional PRBC transfusion at this time.  She will be given IV iron infusions and Procrit after iron repletion as an outpatient.  I will continue to follow the patient with you and assist with the patient's management as needed.  Please call with any questions.

## 2018-06-28 NOTE — TELEPHONE ENCOUNTER
Spoke with nurse at [facility] and scheduled hospital follow up with Dr. Ware.

## 2018-06-28 NOTE — ASSESSMENT & PLAN NOTE
June 26, 2018-the patient will be given IV iron repletion as an outpatient.  After this is completed she will need to start weekly Procrit injections for treatment of this problem.  June 28, 2018-the patient will be given IV iron repletion as an outpatient.  After this is completed she will need to start weekly Procrit injections for treatment of this problem.

## 2018-06-28 NOTE — DISCHARGE SUMMARY
[facility] Hospital Medicine  Discharge Summary      Patient Name: Anu Blanco  MRN: [id number]  Admission Date: 6/25/2018  Hospital Length of Stay: 2 days  Discharge Date and Time:  06/28/2018 6:09 PM  Attending Physician: No att. providers found   Discharging Provider: Rosy Lora NP  Primary Care Provider: Primary Doctor Nelida    HPI:   78F h/o CKD stage 4, HTN, chronic anemia, CAD, and hypothyroidism presents from hem/onc clinic with elevated creatinine, sx of dysuria, possible need for blood transfusion.     Patient reports BLE edema. Has decreased fluid intake.  Also c/o fatigue.  NO other associated symptoms.  Patient denies sob, chest pain, HA, fevers, chills, n/v, c/d.  In ER, VSS.  UA ordered stat.  Labs show Hb 7.7, Cr 4.3.  Hospital medicine called for admission.   Patient's baseline Cr 2-3.  Last seen in renal clinic on 6/1, noted to have elevated creatinine 3.4 from NH at the time.  Taking lasix 40mg po daily. She was admitted for acute kidney injury.    * No surgery found *      Hospital Course:   The pt is a resident of [facility] who was sent to hospital by heme/Onc and placed in observation for ARF/CKD3, UTI, and acute on chronic anemia. As per Dr. Muller, the pt reportedly had a +urinalysis at nursing home PTA. Baseline serum Cr 1.8- 2.0. On arrival to ED, serum Cr 4.3. Pt also has worsening anemia with Hgb 7.7>6.7. Pt has antibodies and did not receive PRBC until today.   Will admit pt, consult Nephrology, and recheck Hgb in am. Rocephin IV ordered. Urinalysis and urine culture pending. Blood cultures show NGTD. Pt was recently admitted with Afib with RVR and CHF exac. Serum Cr was increasing, so Lasix was discontinued. Pt has +3 BLE edema. Cardiac echo 3/2018 showed normal LVEF, diastolic dysfunction, multiple WMAs, and moderate MR. Will check BMP, daily weights, and I/O   6/27/18 - Creatinine 3.5. Dr. Rodriguez ordered kidney u/s, urine studies, no  diuretics. No Family in room.Transfused with 1 unit PRBC. Dr. Muller consulted for chronic anemia. He recommended IV iron infusions and Procrit after iron repletion as an outpatient.   6/28/2018 - review of CBC from today shows interval stability in hemoglobin/hematocrit after transfusion of PRBCs at hospital admission.  No indication for additional PRBC transfusion at this time.  She will be given IV iron infusions and Procrit after iron repletion as an outpatient. Dr. Rodriguez, Nephrology, recommended No ACE, torsemide 20 mg NOT LASIX, outpatient urology appointment due to possible neurogenic bladder. Rx for UTI. Increase Coreg to BID. Patient seen and examined and deemed stable for d/c.      Consults:   Consults         Status Ordering Provider     IP consult to case management  Once     Provider:  (Not yet assigned)    ANASTACIA Koroma          No new Assessment & Plan notes have been filed under this hospital service since the last note was generated.  Service: Hospital Medicine    Final Active Diagnoses:    Diagnosis Date Noted POA    PRINCIPAL PROBLEM:  Acute renal failure superimposed on chronic kidney disease [N17.9, N18.9] 06/25/2018 Yes    Anemia due to stage 3 chronic kidney disease [N18.3, D63.1] 06/26/2018 Yes    Cardiorenal syndrome, stage 1-4 or unspecified chronic kidney disease, with heart failure [I13.0] 06/27/2018 Yes    UTI (urinary tract infection) [N39.0] 06/25/2018 Yes    Symptomatic anemia [D64.9] 06/12/2018 Yes    Atrial fibrillation [I48.91] 03/24/2018 Yes    Essential hypertension [I10] 07/18/2016 Yes    CAD (coronary artery disease) [I25.10]  Yes    Hypothyroid [E03.9]  Yes      Problems Resolved During this Admission:    Diagnosis Date Noted Date Resolved POA     Discharged Condition: stable    Disposition: Nursing Facility with Pl*    Follow Up:  Follow-up Information     Primary Doctor No. Schedule an appointment as soon as possible for a visit in 3 days.            James Smiley MD In 2 weeks.    Specialty:  Nephrology  Contact information:  [address]  [place] [address] [phone]             Jaxon Ware IV, MD. Schedule an appointment as soon as possible for a visit in 1 month.    Specialty:  Urology  Contact information:  [address] [address]  [phone]                 Patient Instructions:     Diet Cardiac     Diet renal         Significant Diagnostic Studies: Labs:   CMP   Recent Labs  Lab 06/27/18  0427 06/28/18  0425    138   K 4.0 4.1    107   CO2 21* 20*   GLU 98 94   BUN 57* 54*   CREATININE 3.5* 3.4*   CALCIUM 8.9 8.6*   ALBUMIN 2.5* 2.4*   ANIONGAP 11 11   ESTGFRAFRICA 14* 14*   EGFRNONAA 12* 12*   , CBC   Recent Labs  Lab 06/27/18  0427 06/28/18  0425   WBC 8.14 8.33   HGB 8.3* 8.1*   HCT 26.4* 26.7*    223   , INR   Lab Results   Component Value Date    INR 1.0 06/25/2018    INR 1.0 03/23/2018    and Lipid Panel   Lab Results   Component Value Date    CHOL 211 (H) 03/24/2018    HDL 38 (L) 03/24/2018    LDLCALC 148.8 03/24/2018    TRIG 121 03/24/2018    CHOLHDL 18.0 (L) 03/24/2018       Pending Diagnostic Studies:     None         Medications:  Reconciled Home Medications:      Medication List      START taking these medications    levoFLOXacin 500 MG tablet  Commonly known as:  LEVAQUIN  Take 1 tablet (500 mg total) by mouth once daily. for 4 days  Start taking on:  6/29/2018     torsemide 20 MG Tab  Commonly known as:  DEMADEX  Take 1 tablet (20 mg total) by mouth once daily.        CHANGE how you take these medications    carvedilol 25 MG tablet  Commonly known as:  COREG  Take 1 tablet (25 mg total) by mouth 2 (two) times daily with meals.  What changed:  when to take this        CONTINUE taking these medications    apixaban 2.5 mg Tab  Take 1 tablet (2.5 mg total) by mouth 2 (two) times daily.     aspirin 81 MG Chew  Take 1 tablet (81 mg total) by mouth once daily.     atorvastatin 40 MG  tablet  Commonly known as:  LIPITOR  TAKE ONE TABLET BY MOUTH ONCE DAILY     levothyroxine 75 MCG tablet  Commonly known as:  SYNTHROID  Take 1 tablet (75 mcg total) by mouth once daily.     pantoprazole 40 MG tablet  Commonly known as:  PROTONIX  Take 40 mg by mouth once daily.     VITAMIN D2 ORAL  Take 2,000 Units by mouth once daily.        STOP taking these medications    furosemide 40 MG tablet  Commonly known as:  LASIX            Indwelling Lines/Drains at time of discharge:   Lines/Drains/Airways          No matching active lines, drains, or airways      Time spent on the discharge of patient: 45 minutes  Patient was seen and examined on the date of discharge and determined to be suitable for discharge.    Rosy Lora NP  Department of Hospital Medicine  [facility]

## 2018-06-28 NOTE — ASSESSMENT & PLAN NOTE
Patient has cardiorenal syndrome      Patient has been fluctuating with acute kidney injury from over diuresis and congestive heart failure and fluid overload with under diuresis.    Renal ultrasound shows chronic medical disease.  Urine examination shows 2.2 g of proteinuria.  Due to recurrent acute kidney injury I would avoid ACE-inhibitor at this time.  She should probably go home on torsemide 20 mg daily instead of Lasix and have the patient follow up with Dr. Smiley  as an outpatient    Patient does have pyuria.  Does not have high procalcitonin.  Patient may have neurogenic bladder.  May need outpatient evaluation by Urology.

## 2018-06-28 NOTE — PLAN OF CARE
06/28/18 1400   Medicare Message   Important Message from Medicare regarding Discharge Appeal Rights Given to patient/caregiver;Explained to patient/caregiver;Signed/date by patient/caregiver   Date IMM was signed 06/28/18   Time IMM was signed 1400

## 2018-06-28 NOTE — TELEPHONE ENCOUNTER
Pt. Scheduled per Dr. Rodriguez he wants pt. To be seen with Dr. Smiley at his next available date which is in August. Thanks.

## 2018-06-28 NOTE — PROGRESS NOTES
[facility] -   Nephrology  Progress Note    Patient Name: Anu Blanco  MRN: [id number]  Admission Date: 6/25/2018  Hospital Length of Stay: 2 days  Attending Provider: Alda Hanson MD   Primary Care Physician: Primary Doctor No  Principal Problem:Acute renal failure superimposed on chronic kidney disease    Subjective:     HPI: Patient is an 78-year-old female with chronic kidney disease stage 4 has been seen by Dr. Ramirez in the Nephrology Clinic patient has had multiple episodes of acute kidney injury with chronic kidney disease stage 4 with creatinine fluctuating between 2 and 3.0.  The patient has been fluid overloaded as well as had acute kidney injury from over diuresis.  Patient has been readmitted with acute kidney injury creatinine greater than 4 and a Renal consultation is requested.  Patient has been on Lasix at home.  Patient has not been eating very well.  Patient seems to have some swelling in the legs which is better than before.    Interval History:  Urine examination shows greater than 100 white cells.  Procalcitonin is negative. Renal ultrasound noted.  Acute kidney injury has improved.  Cardiorenal syndrome discussed with Hospital Medicine dr. Hanson    Review of patient's allergies indicates:   Allergen Reactions    Codeine      Does not remember side effects    Corticosteroids (glucocorticoids)     Diovan hct [valsartan-hydrochlorothiazide]      May drowsy    Hydrocodone      Itching, nausea and vomiting.    Indomethacin     Iodine and iodide containing products      Rash and swelling    Lortab [hydrocodone-acetaminophen]     Omnicef [cefdinir]      Rash/itching    Oxycodone      Itching    Penicillins      Vaginal yeast infection     Current Facility-Administered Medications   Medication Frequency    0.9%  NaCl infusion (for blood administration) Q24H PRN    acetaminophen tablet 650 mg Q4H PRN    apixaban tablet 2.5 mg BID    aspirin chewable tablet 81 mg Daily     atorvastatin tablet 40 mg Daily    carvedilol tablet 25 mg Daily    cefTRIAXone (ROCEPHIN) 1 g in dextrose 5 % 50 mL IVPB Q24H    dextrose 50% injection 12.5 g PRN    dextrose 50% injection 25 g PRN    glucagon (human recombinant) injection 1 mg PRN    glucose chewable tablet 16 g PRN    glucose chewable tablet 24 g PRN    hydrALAZINE injection 5 mg Q8H PRN    levothyroxine tablet 75 mcg Before breakfast    sodium chloride 0.9% flush 5 mL PRN       Objective:     Vital Signs (Most Recent):  Temp: 98.9 °F (37.2 °C) (06/28/18 0741)  Pulse: 72 (06/28/18 0741)  Resp: 19 (06/28/18 0741)  BP: (!) 150/66 (06/28/18 0741)  SpO2: (!) 92 % (06/28/18 0741)  O2 Device (Oxygen Therapy): room air (06/28/18 0839) Vital Signs (24h Range):  Temp:  [98.2 °F (36.8 °C)-98.9 °F (37.2 °C)] 98.9 °F (37.2 °C)  Pulse:  [65-86] 72  Resp:  [17-20] 19  SpO2:  [89 %-93 %] 92 %  BP: (133-180)/(63-85) 150/66     Weight: 68.4 kg (150 lb 12.8 oz) (06/26/18 0034)  Body mass index is 27.58 kg/m².  Body surface area is 1.73 meters squared.    I/O last 3 completed shifts:  In: 290 [P.O.:240; IV Piggyback:50]  Out: -     Physical Exam   Constitutional: She is oriented to person, place, and time. She appears well-developed and well-nourished.   HENT:   Head: Normocephalic and atraumatic.   Eyes: Conjunctivae and EOM are normal. Pupils are equal, round, and reactive to light.   Neck: Normal range of motion and full passive range of motion without pain. Neck supple. Carotid bruit is not present. No edema present. No thyroid mass and no thyromegaly present.   Cardiovascular: Normal rate, regular rhythm, S1 normal, S2 normal, intact distal pulses and normal pulses.   No extrasystoles are present. PMI is displaced.  Exam reveals no friction rub.    Murmur heard.   Systolic murmur is present with a grade of 2/6   RV heave loud P2    Pulmonary/Chest: Effort normal. No accessory muscle usage. No respiratory distress. She has no wheezes. She has no  rales. She exhibits no tenderness.   Abdominal: Soft. Bowel sounds are normal. She exhibits no distension and no mass. There is no hepatosplenomegaly. There is no tenderness. There is no rebound and no CVA tenderness. No hernia.   Musculoskeletal: Normal range of motion. She exhibits edema. She exhibits no tenderness.   Neurological: She is alert and oriented to person, place, and time. She has normal reflexes. No cranial nerve deficit or sensory deficit. She exhibits normal muscle tone. Coordination normal.   Skin: Skin is warm and dry. No bruising, no ecchymosis and no rash noted. No cyanosis or erythema. No pallor. Nails show no clubbing.   Psychiatric: She has a normal mood and affect. Her speech is normal and behavior is normal. Judgment and thought content normal.       Significant Labs:  All labs within the past 24 hours have been reviewed.     Significant Imaging:  Labs: Reviewed  US: Reviewed    Assessment/Plan:     * Acute renal failure superimposed on chronic kidney disease    Patient has cardiorenal syndrome      Patient has been fluctuating with acute kidney injury from over diuresis and congestive heart failure and fluid overload with under diuresis.    Renal ultrasound shows chronic medical disease.  Urine examination shows 2.2 g of proteinuria.  Due to recurrent acute kidney injury I would avoid ACE-inhibitor at this time.  She should probably go home on torsemide 20 mg daily instead of Lasix and have the patient follow up with Dr. Smiley  as an outpatient    Patient does have pyuria.  Does not have high procalcitonin.  Patient may have neurogenic bladder.  May need outpatient evaluation by Urology.            Thank you for your consult.     Yair Rodriguez MD  Nephrology  [facility]

## 2018-06-28 NOTE — SUBJECTIVE & OBJECTIVE
Interval clinical history- Overnight clinical events reviewed.  Patient reports that she feels fine.  History is unreliable because of underlying dementia.    Oncology Treatment Plan:   [No treatment plan]    Medications:  Continuous Infusions:  Scheduled Meds:    PRN Meds:     Review of patient's allergies indicates:   Allergen Reactions    Codeine      Does not remember side effects    Corticosteroids (glucocorticoids)     Diovan hct [valsartan-hydrochlorothiazide]      May drowsy    Hydrocodone      Itching, nausea and vomiting.    Indomethacin     Iodine and iodide containing products      Rash and swelling    Lortab [hydrocodone-acetaminophen]     Omnicef [cefdinir]      Rash/itching    Oxycodone      Itching    Penicillins      Vaginal yeast infection        Past Medical History:   Diagnosis Date    Anemia     CAD (coronary artery disease)     Chronic back pain     Fibromyalgia     H/O aortic valve replacement 2010    History of breast cancer 1994    Right Lumpectomy    Hyperlipemia     Hypothyroid     Osteoarthritis     PVD (peripheral vascular disease)     Renal failure     Rheumatoid arteritis     S/P CABG (coronary artery bypass graft) 91,10    Syncope and collapse 07/2016    Vitamin D deficiency      Past Surgical History:   Procedure Laterality Date    ANKLE FRACTURE SURGERY      right foot    AORTIC VALVE REPLACEMENT  2010    APPENDECTOMY      APPENDECTOMY      BREAST LUMPECTOMY Right 1994    CORONARY ARTERY BYPASS GRAFT      HYSTERECTOMY      TONSILLECTOMY       Family History     None        Social History Main Topics    Smoking status: Never Smoker    Smokeless tobacco: Never Used    Alcohol use No    Drug use: No    Sexual activity: Not Currently       Review of Systems   Unable to perform ROS: Dementia     Objective:     Vital Signs (Most Recent):  Temp: 98.5 °F (36.9 °C) (06/28/18 1151)  Pulse: 78 (06/28/18 1151)  Resp: 19 (06/28/18 0741)  BP: (!) 143/67  (06/28/18 1151)  SpO2: (!) 94 % (06/28/18 1151) Vital Signs (24h Range):  Temp:  [98.2 °F (36.8 °C)-98.9 °F (37.2 °C)] 98.5 °F (36.9 °C)  Pulse:  [72-86] 78  Resp:  [18-20] 19  SpO2:  [89 %-94 %] 94 %  BP: (137-180)/(63-85) 143/67     Weight: 68.4 kg (150 lb 12.8 oz)  Body mass index is 27.58 kg/m².  Body surface area is 1.73 meters squared.      Intake/Output Summary (Last 24 hours) at 06/28/18 1753  Last data filed at 06/28/18 1300   Gross per 24 hour   Intake              240 ml   Output                0 ml   Net              240 ml       Physical Exam   Constitutional: She is oriented to person, place, and time. She appears well-developed and well-nourished. She is active and cooperative.   HENT:   Head: Normocephalic and atraumatic.   Nose: Nose normal.   Mouth/Throat: Oropharynx is clear and moist. No oropharyngeal exudate.   Eyes: Pupils are equal, round, and reactive to light. No scleral icterus.   Neck: Neck supple. No thyromegaly present.   Cardiovascular: Normal rate, regular rhythm, normal heart sounds and intact distal pulses.    No murmur heard.  Pulmonary/Chest: Effort normal and breath sounds normal. No stridor. No respiratory distress. She has no wheezes. She has no rales.   Abdominal: Soft. Bowel sounds are normal. She exhibits no distension, no ascites and no mass. There is no hepatosplenomegaly. There is no tenderness. There is no rigidity, no rebound and no guarding.   Musculoskeletal: She exhibits edema. She exhibits no tenderness.   Lymphadenopathy:     She has no cervical adenopathy.   Neurological: She is alert and oriented to person, place, and time. No cranial nerve deficit. She exhibits normal muscle tone. Coordination normal.   Skin: Skin is warm and dry. No rash noted. No pallor.   Psychiatric: She has a normal mood and affect. Her speech is normal. Judgment and thought content normal. She is slowed and withdrawn. Cognition and memory are impaired. She exhibits abnormal recent memory.    Nursing note and vitals reviewed.      Significant Labs:   I have reviewed all of the patient's relevant lab work available in the medical record and have utilized this in my evaluation and management recommendations today    Diagnostic Results:  I have reviewed all of the patient's diagnostic/imaging results available in the medical record and have utilized this in my evaluation and management recommendations today.

## 2018-06-28 NOTE — PLAN OF CARE
Problem: Patient Care Overview  Goal: Plan of Care Review  Outcome: Ongoing (interventions implemented as appropriate)  Fall precautions maintained, pt free from falls/injuries  PT repositions and ambulates w/ assist  Pt has no c/o pain  Fluid restrictions  Pt is up in chair  POC and medications reviewed with pt, pt verbalized understanding.  Side rails x 2 up, bed locked and low.  No signs/symptoms of acute distress.  Chart check done. Will cont to monitor.

## 2018-06-28 NOTE — PLAN OF CARE
Problem: Patient Care Overview  Goal: Plan of Care Review  Outcome: Ongoing (interventions implemented as appropriate)  Pt. remained free from injury. Bed alarm set. Cardiac monitored  Denies pain. No s/s of acute distress. 12hr chart check complete.

## 2018-06-28 NOTE — TELEPHONE ENCOUNTER
----- Message from Andres Nice RN sent at 6/28/2018  2:06 PM CDT -----  pelase call pt w/ appt in month

## 2018-06-28 NOTE — NURSING
Discharge instructions, medications, and appointments reviewed with patient. Pt verbalized understanding. LDA removed. No further need/questions. Pt adequate for DC.  Dr larson and otf office messaged to contact pt w/ f/u appt.   cristina andrade here to  pt.  DC papers and prescriptions given to cristina andrade

## 2018-06-28 NOTE — DISCHARGE SUMMARY
Patient seen and examined. Will discuss with patient's son. Waiting on Hematology to see patient. Will discuss case with DR. Rodriguez, Nephrology, prior to discharge. ppt

## 2018-06-28 NOTE — PLAN OF CARE
06/28/18 1447   Final Note   Assessment Type Final Discharge Note   Discharge Disposition care home [facility]  ([facility])   Discharge plans and expectations educations in teach back method with documentation complete? Yes   Right Care Referral Info   Post Acute Recommendation Other  ([facility])

## 2018-06-28 NOTE — SUBJECTIVE & OBJECTIVE
Interval History:  Urine examination shows greater than 100 white cells.  Procalcitonin is negative. Renal ultrasound noted.  Acute kidney injury has improved.  Cardiorenal syndrome discussed with Hospital Medicine dr. Hanson    Review of patient's allergies indicates:   Allergen Reactions    Codeine      Does not remember side effects    Corticosteroids (glucocorticoids)     Diovan hct [valsartan-hydrochlorothiazide]      May drowsy    Hydrocodone      Itching, nausea and vomiting.    Indomethacin     Iodine and iodide containing products      Rash and swelling    Lortab [hydrocodone-acetaminophen]     Omnicef [cefdinir]      Rash/itching    Oxycodone      Itching    Penicillins      Vaginal yeast infection     Current Facility-Administered Medications   Medication Frequency    0.9%  NaCl infusion (for blood administration) Q24H PRN    acetaminophen tablet 650 mg Q4H PRN    apixaban tablet 2.5 mg BID    aspirin chewable tablet 81 mg Daily    atorvastatin tablet 40 mg Daily    carvedilol tablet 25 mg Daily    cefTRIAXone (ROCEPHIN) 1 g in dextrose 5 % 50 mL IVPB Q24H    dextrose 50% injection 12.5 g PRN    dextrose 50% injection 25 g PRN    glucagon (human recombinant) injection 1 mg PRN    glucose chewable tablet 16 g PRN    glucose chewable tablet 24 g PRN    hydrALAZINE injection 5 mg Q8H PRN    levothyroxine tablet 75 mcg Before breakfast    sodium chloride 0.9% flush 5 mL PRN       Objective:     Vital Signs (Most Recent):  Temp: 98.9 °F (37.2 °C) (06/28/18 0741)  Pulse: 72 (06/28/18 0741)  Resp: 19 (06/28/18 0741)  BP: (!) 150/66 (06/28/18 0741)  SpO2: (!) 92 % (06/28/18 0741)  O2 Device (Oxygen Therapy): room air (06/28/18 0839) Vital Signs (24h Range):  Temp:  [98.2 °F (36.8 °C)-98.9 °F (37.2 °C)] 98.9 °F (37.2 °C)  Pulse:  [65-86] 72  Resp:  [17-20] 19  SpO2:  [89 %-93 %] 92 %  BP: (133-180)/(63-85) 150/66     Weight: 68.4 kg (150 lb 12.8 oz) (06/26/18 0034)  Body mass index is  27.58 kg/m².  Body surface area is 1.73 meters squared.    I/O last 3 completed shifts:  In: 290 [P.O.:240; IV Piggyback:50]  Out: -     Physical Exam   Constitutional: She is oriented to person, place, and time. She appears well-developed and well-nourished.   HENT:   Head: Normocephalic and atraumatic.   Eyes: Conjunctivae and EOM are normal. Pupils are equal, round, and reactive to light.   Neck: Normal range of motion and full passive range of motion without pain. Neck supple. Carotid bruit is not present. No edema present. No thyroid mass and no thyromegaly present.   Cardiovascular: Normal rate, regular rhythm, S1 normal, S2 normal, intact distal pulses and normal pulses.   No extrasystoles are present. PMI is displaced.  Exam reveals no friction rub.    Murmur heard.   Systolic murmur is present with a grade of 2/6   RV heave loud P2    Pulmonary/Chest: Effort normal. No accessory muscle usage. No respiratory distress. She has no wheezes. She has no rales. She exhibits no tenderness.   Abdominal: Soft. Bowel sounds are normal. She exhibits no distension and no mass. There is no hepatosplenomegaly. There is no tenderness. There is no rebound and no CVA tenderness. No hernia.   Musculoskeletal: Normal range of motion. She exhibits edema. She exhibits no tenderness.   Neurological: She is alert and oriented to person, place, and time. She has normal reflexes. No cranial nerve deficit or sensory deficit. She exhibits normal muscle tone. Coordination normal.   Skin: Skin is warm and dry. No bruising, no ecchymosis and no rash noted. No cyanosis or erythema. No pallor. Nails show no clubbing.   Psychiatric: She has a normal mood and affect. Her speech is normal and behavior is normal. Judgment and thought content normal.       Significant Labs:  All labs within the past 24 hours have been reviewed.     Significant Imaging:  Labs: Reviewed  US: Reviewed

## 2018-06-28 NOTE — TELEPHONE ENCOUNTER
----- Message from Andres Nice RN sent at 6/28/2018  2:05 PM CDT -----  Please call pt w/ f/u appt in 2 weeks

## 2018-06-28 NOTE — PROGRESS NOTES
[facility]  Hematology/Oncology  Progress Note    Patient Name: Anu Blanco  Admission Date: 6/25/2018  Hospital Length of Stay: 2 days  Code Status: Prior     Subjective:     HPI:  The patient is a 78-year-old  female who is currently admitted to the Internal Medicine team at [facility] [place] for further evaluation and management of acute on chronic renal failure, symptomatic anemia and UTI.  The patient is followed in the outpatient Hematology Oncology Clinic for chronic anemia.  I have reviewed all the patient's relevant clinical history available medical record have utilized as my evaluation management recommendations today.  The patient does have dementia and is a poor historian.    Interval clinical history- Overnight clinical events reviewed.  Patient reports that she feels fine.  History is unreliable because of underlying dementia.    Oncology Treatment Plan:   [No treatment plan]    Medications:  Continuous Infusions:  Scheduled Meds:    PRN Meds:     Review of patient's allergies indicates:   Allergen Reactions    Codeine      Does not remember side effects    Corticosteroids (glucocorticoids)     Diovan hct [valsartan-hydrochlorothiazide]      May drowsy    Hydrocodone      Itching, nausea and vomiting.    Indomethacin     Iodine and iodide containing products      Rash and swelling    Lortab [hydrocodone-acetaminophen]     Omnicef [cefdinir]      Rash/itching    Oxycodone      Itching    Penicillins      Vaginal yeast infection        Past Medical History:   Diagnosis Date    Anemia     CAD (coronary artery disease)     Chronic back pain     Fibromyalgia     H/O aortic valve replacement 2010    History of breast cancer 1994    Right Lumpectomy    Hyperlipemia     Hypothyroid     Osteoarthritis     PVD (peripheral vascular disease)     Renal failure     Rheumatoid arteritis     S/P CABG (coronary artery bypass graft) 91,10    Syncope and  collapse 07/2016    Vitamin D deficiency      Past Surgical History:   Procedure Laterality Date    ANKLE FRACTURE SURGERY      right foot    AORTIC VALVE REPLACEMENT  2010    APPENDECTOMY      APPENDECTOMY      BREAST LUMPECTOMY Right 1994    CORONARY ARTERY BYPASS GRAFT      HYSTERECTOMY      TONSILLECTOMY       Family History     None        Social History Main Topics    Smoking status: Never Smoker    Smokeless tobacco: Never Used    Alcohol use No    Drug use: No    Sexual activity: Not Currently       Review of Systems   Unable to perform ROS: Dementia     Objective:     Vital Signs (Most Recent):  Temp: 98.5 °F (36.9 °C) (06/28/18 1151)  Pulse: 78 (06/28/18 1151)  Resp: 19 (06/28/18 0741)  BP: (!) 143/67 (06/28/18 1151)  SpO2: (!) 94 % (06/28/18 1151) Vital Signs (24h Range):  Temp:  [98.2 °F (36.8 °C)-98.9 °F (37.2 °C)] 98.5 °F (36.9 °C)  Pulse:  [72-86] 78  Resp:  [18-20] 19  SpO2:  [89 %-94 %] 94 %  BP: (137-180)/(63-85) 143/67     Weight: 68.4 kg (150 lb 12.8 oz)  Body mass index is 27.58 kg/m².  Body surface area is 1.73 meters squared.      Intake/Output Summary (Last 24 hours) at 06/28/18 1753  Last data filed at 06/28/18 1300   Gross per 24 hour   Intake              240 ml   Output                0 ml   Net              240 ml       Physical Exam   Constitutional: She is oriented to person, place, and time. She appears well-developed and well-nourished. She is active and cooperative.   HENT:   Head: Normocephalic and atraumatic.   Nose: Nose normal.   Mouth/Throat: Oropharynx is clear and moist. No oropharyngeal exudate.   Eyes: Pupils are equal, round, and reactive to light. No scleral icterus.   Neck: Neck supple. No thyromegaly present.   Cardiovascular: Normal rate, regular rhythm, normal heart sounds and intact distal pulses.    No murmur heard.  Pulmonary/Chest: Effort normal and breath sounds normal. No stridor. No respiratory distress. She has no wheezes. She has no rales.    Abdominal: Soft. Bowel sounds are normal. She exhibits no distension, no ascites and no mass. There is no hepatosplenomegaly. There is no tenderness. There is no rigidity, no rebound and no guarding.   Musculoskeletal: She exhibits edema. She exhibits no tenderness.   Lymphadenopathy:     She has no cervical adenopathy.   Neurological: She is alert and oriented to person, place, and time. No cranial nerve deficit. She exhibits normal muscle tone. Coordination normal.   Skin: Skin is warm and dry. No rash noted. No pallor.   Psychiatric: She has a normal mood and affect. Her speech is normal. Judgment and thought content normal. She is slowed and withdrawn. Cognition and memory are impaired. She exhibits abnormal recent memory.   Nursing note and vitals reviewed.      Significant Labs:   I have reviewed all of the patient's relevant lab work available in the medical record and have utilized this in my evaluation and management recommendations today    Diagnostic Results:  I have reviewed all of the patient's diagnostic/imaging results available in the medical record and have utilized this in my evaluation and management recommendations today.    Assessment/Plan:     Anemia due to stage 3 chronic kidney disease    June 26, 2018-the patient will be given IV iron repletion as an outpatient.  After this is completed she will need to start weekly Procrit injections for treatment of this problem.  June 28, 2018-the patient will be given IV iron repletion as an outpatient.  After this is completed she will need to start weekly Procrit injections for treatment of this problem.        Symptomatic anemia    June 26, 2018- I have recommended transfusion of 1 unit of PRBCs for treatment of her symptomatic anemia.  We will continue to monitor her hemoglobin/hematocrit closely.  The patient will be given IV iron infusions as an outpatient for her iron deficiency.  She will also start Procrit injections as an outpatient for  treatment of her anemia.  I will continue follow the patient with you and assist with the patient's management as needed.  Please call with any any questions.  June 27, 2018-review of CBC from today shows interval improvement in hemoglobin/hematocrit after transfusion of PRBCs overnight.  No indication for additional PRBC transfusion at this time.  She will be given IV iron infusions and Procrit after iron repletion as an outpatient.  I will continue to follow the patient with you and assist with the patient's management as needed.  Please call with any questions.  June 28, 2018-review of CBC from today shows interval stability in hemoglobin/hematocrit after transfusion of PRBCs at hospital admission.  No indication for additional PRBC transfusion at this time.  She will be given IV iron infusions and Procrit after iron repletion as an outpatient.  I will continue to follow the patient with you and assist with the patient's management as needed.  Please call with any questions.            Thank you for your consult.      Rudy Muller MD  Hematology/Oncology  [facility]

## 2018-06-30 LAB — BACTERIA UR CULT: NORMAL

## 2018-07-01 LAB
BACTERIA BLD CULT: NORMAL
BACTERIA BLD CULT: NORMAL

## 2018-07-09 ENCOUNTER — TELEPHONE (OUTPATIENT)
Dept: HEMATOLOGY/ONCOLOGY | Facility: CLINIC | Age: 78
End: 2018-07-09

## 2018-07-09 NOTE — TELEPHONE ENCOUNTER
----- Message from Rudy Muller MD sent at 7/5/2018  3:08 PM CDT -----  Contact: pt son - Faustino Early   Stage 4 cancer? I dont see that in the chart. She has stage 4 kidney disease. Maybe he was referring to that. That's fine if he wants us to cancel. Please document cancellations as per patient's son request and cancel all follow up appointments with me. thanks  ----- Message -----  From: Marietta Barriga MA  Sent: 7/5/2018  10:00 AM  To: Rudy Muller MD    Pt son called to cx iron infusion tx. Says his mother is in stage 4 cancer and she does not want to have the iron tx. Pt is in nursing home and wants hospice care as well .   ----- Message -----  From: Diane Perez  Sent: 7/5/2018   8:48 AM  To: Renzo Grant Staff    States he's calling rg the treatment of his mother the pt and no longer wanting to be treated an wants to discuss and can be reached at [phone]

## 2018-07-09 NOTE — TELEPHONE ENCOUNTER
Spoke with pt son and he is aware of the stage 4 kidney  Disease did not won't to continue with iron infusion.

## 2021-04-21 NOTE — ASSESSMENT & PLAN NOTE
June 26, 2018- I have recommended transfusion of 1 unit of PRBCs for treatment of her symptomatic anemia.  We will continue to monitor her hemoglobin/hematocrit closely.  The patient will be given IV iron infusions as an outpatient for her iron deficiency.  She will also start Procrit injections as an outpatient for treatment of her anemia.  I will continue follow the patient with you and assist with the patient's management as needed.  Please call with any any questions.   oral

## 2023-07-18 NOTE — TELEPHONE ENCOUNTER
----- Message from Leona Go sent at 3/23/2018  2:49 PM CDT -----  Contact: Ezekiel Balderrama-[phone]   Returning call, please call back at [phone]. Thx-AH   Area H Indication Text: Tumors in this location are included in Area H (eyelids, eyebrows, nose, lips, chin, ear, pre-auricular, post-auricular, temple, genitalia, hands, feet, ankles and areola).  Tissue conservation is critical in these anatomic locations.